# Patient Record
Sex: MALE | Race: WHITE | Employment: FULL TIME | URBAN - METROPOLITAN AREA
[De-identification: names, ages, dates, MRNs, and addresses within clinical notes are randomized per-mention and may not be internally consistent; named-entity substitution may affect disease eponyms.]

---

## 2019-03-14 ENCOUNTER — HOSPITAL ENCOUNTER (INPATIENT)
Dept: CARDIAC CATH/INVASIVE PROCEDURES | Age: 49
LOS: 1 days | Discharge: HOME | DRG: 247 | End: 2019-03-15
Attending: INTERNAL MEDICINE | Admitting: INTERNAL MEDICINE
Payer: COMMERCIAL

## 2019-03-14 ENCOUNTER — DIRECT ADMIT ORDERS (OUTPATIENT)
Dept: INTERNAL MEDICINE CLINIC | Age: 49
End: 2019-03-14

## 2019-03-14 ENCOUNTER — HOSPITAL ENCOUNTER (OUTPATIENT)
Dept: NON INVASIVE DIAGNOSTICS | Age: 49
Discharge: HOME | DRG: 247 | End: 2019-03-14
Payer: COMMERCIAL

## 2019-03-14 DIAGNOSIS — R94.39 ABNORMAL STRESS TEST: ICD-10-CM

## 2019-03-14 DIAGNOSIS — K21.9 GASTROESOPHAGEAL REFLUX DISEASE WITHOUT ESOPHAGITIS: ICD-10-CM

## 2019-03-14 DIAGNOSIS — I10 ESSENTIAL HYPERTENSION: ICD-10-CM

## 2019-03-14 PROBLEM — I20.0 UNSTABLE ANGINA (HCC): Status: ACTIVE | Noted: 2019-03-14

## 2019-03-14 PROBLEM — R07.9 CHEST PAIN: Status: ACTIVE | Noted: 2019-03-14

## 2019-03-14 PROBLEM — I25.10 CAD (CORONARY ARTERY DISEASE): Status: ACTIVE | Noted: 2019-03-01

## 2019-03-14 LAB
EKG ATRIAL RATE: 69 BPM
EKG DIAGNOSIS: NORMAL
EKG P AXIS: 68 DEGREES
EKG P-R INTERVAL: 180 MS
EKG Q-T INTERVAL: 400 MS
EKG QRS DURATION: 102 MS
EKG QTC CALCULATION (BAZETT): 428 MS
EKG R AXIS: -24 DEGREES
EKG T AXIS: 11 DEGREES
EKG VENTRICULAR RATE: 69 BPM
POC ACT LR: 197 SEC

## 2019-03-14 PROCEDURE — C1769 GUIDE WIRE: HCPCS

## 2019-03-14 PROCEDURE — 6370000000 HC RX 637 (ALT 250 FOR IP): Performed by: INTERNAL MEDICINE

## 2019-03-14 PROCEDURE — 2580000003 HC RX 258: Performed by: INTERNAL MEDICINE

## 2019-03-14 PROCEDURE — C1874 STENT, COATED/COV W/DEL SYS: HCPCS

## 2019-03-14 PROCEDURE — C1887 CATHETER, GUIDING: HCPCS

## 2019-03-14 PROCEDURE — 93458 L HRT ARTERY/VENTRICLE ANGIO: CPT

## 2019-03-14 PROCEDURE — 92928 PRQ TCAT PLMT NTRAC ST 1 LES: CPT | Performed by: INTERNAL MEDICINE

## 2019-03-14 PROCEDURE — 6360000002 HC RX W HCPCS: Performed by: INTERNAL MEDICINE

## 2019-03-14 PROCEDURE — 2060000000 HC ICU INTERMEDIATE R&B

## 2019-03-14 PROCEDURE — 99255 IP/OBS CONSLTJ NEW/EST HI 80: CPT | Performed by: INTERNAL MEDICINE

## 2019-03-14 PROCEDURE — 93005 ELECTROCARDIOGRAM TRACING: CPT | Performed by: INTERNAL MEDICINE

## 2019-03-14 PROCEDURE — C1725 CATH, TRANSLUMIN NON-LASER: HCPCS

## 2019-03-14 PROCEDURE — 78452 HT MUSCLE IMAGE SPECT MULT: CPT

## 2019-03-14 PROCEDURE — 93458 L HRT ARTERY/VENTRICLE ANGIO: CPT | Performed by: INTERNAL MEDICINE

## 2019-03-14 PROCEDURE — 6360000004 HC RX CONTRAST MEDICATION: Performed by: INTERNAL MEDICINE

## 2019-03-14 PROCEDURE — 99153 MOD SED SAME PHYS/QHP EA: CPT

## 2019-03-14 PROCEDURE — 6370000000 HC RX 637 (ALT 250 FOR IP)

## 2019-03-14 PROCEDURE — 78451 HT MUSCLE IMAGE SPECT SING: CPT

## 2019-03-14 PROCEDURE — 92928 PRQ TCAT PLMT NTRAC ST 1 LES: CPT

## 2019-03-14 PROCEDURE — C1894 INTRO/SHEATH, NON-LASER: HCPCS

## 2019-03-14 PROCEDURE — 2709999900 HC NON-CHARGEABLE SUPPLY

## 2019-03-14 PROCEDURE — 99152 MOD SED SAME PHYS/QHP 5/>YRS: CPT

## 2019-03-14 PROCEDURE — 2500000003 HC RX 250 WO HCPCS

## 2019-03-14 PROCEDURE — 027034Z DILATION OF CORONARY ARTERY, ONE ARTERY WITH DRUG-ELUTING INTRALUMINAL DEVICE, PERCUTANEOUS APPROACH: ICD-10-PCS | Performed by: INTERNAL MEDICINE

## 2019-03-14 PROCEDURE — 99223 1ST HOSP IP/OBS HIGH 75: CPT | Performed by: INTERNAL MEDICINE

## 2019-03-14 PROCEDURE — B2151ZZ FLUOROSCOPY OF LEFT HEART USING LOW OSMOLAR CONTRAST: ICD-10-PCS | Performed by: INTERNAL MEDICINE

## 2019-03-14 PROCEDURE — 85347 COAGULATION TIME ACTIVATED: CPT

## 2019-03-14 PROCEDURE — 4A023N7 MEASUREMENT OF CARDIAC SAMPLING AND PRESSURE, LEFT HEART, PERCUTANEOUS APPROACH: ICD-10-PCS | Performed by: INTERNAL MEDICINE

## 2019-03-14 PROCEDURE — 3430000000 HC RX DIAGNOSTIC RADIOPHARMACEUTICAL: Performed by: INTERNAL MEDICINE

## 2019-03-14 PROCEDURE — A9502 TC99M TETROFOSMIN: HCPCS | Performed by: INTERNAL MEDICINE

## 2019-03-14 PROCEDURE — 6360000002 HC RX W HCPCS

## 2019-03-14 RX ORDER — SODIUM CHLORIDE 0.9 % (FLUSH) 0.9 %
10 SYRINGE (ML) INJECTION PRN
Status: CANCELLED | OUTPATIENT
Start: 2019-03-14

## 2019-03-14 RX ORDER — SODIUM CHLORIDE 0.9 % (FLUSH) 0.9 %
10 SYRINGE (ML) INJECTION PRN
Status: DISCONTINUED | OUTPATIENT
Start: 2019-03-14 | End: 2019-03-15 | Stop reason: HOSPADM

## 2019-03-14 RX ORDER — ACETAMINOPHEN 325 MG/1
650 TABLET ORAL EVERY 4 HOURS PRN
Status: CANCELLED | OUTPATIENT
Start: 2019-03-14

## 2019-03-14 RX ORDER — SODIUM CHLORIDE 0.9 % (FLUSH) 0.9 %
10 SYRINGE (ML) INJECTION EVERY 12 HOURS SCHEDULED
Status: CANCELLED | OUTPATIENT
Start: 2019-03-14

## 2019-03-14 RX ORDER — ACETAMINOPHEN 325 MG/1
650 TABLET ORAL EVERY 4 HOURS PRN
Status: DISCONTINUED | OUTPATIENT
Start: 2019-03-14 | End: 2019-03-14 | Stop reason: SDUPTHER

## 2019-03-14 RX ORDER — HYDRALAZINE HYDROCHLORIDE 20 MG/ML
10 INJECTION INTRAMUSCULAR; INTRAVENOUS
Status: DISCONTINUED | OUTPATIENT
Start: 2019-03-14 | End: 2019-03-15 | Stop reason: HOSPADM

## 2019-03-14 RX ORDER — SODIUM CHLORIDE 0.9 % (FLUSH) 0.9 %
10 SYRINGE (ML) INJECTION EVERY 12 HOURS SCHEDULED
Status: DISCONTINUED | OUTPATIENT
Start: 2019-03-14 | End: 2019-03-14 | Stop reason: SDUPTHER

## 2019-03-14 RX ORDER — ATORVASTATIN CALCIUM 40 MG/1
40 TABLET, FILM COATED ORAL NIGHTLY
Status: DISCONTINUED | OUTPATIENT
Start: 2019-03-14 | End: 2019-03-15 | Stop reason: HOSPADM

## 2019-03-14 RX ORDER — ACETAMINOPHEN 325 MG/1
650 TABLET ORAL EVERY 4 HOURS PRN
Status: DISCONTINUED | OUTPATIENT
Start: 2019-03-14 | End: 2019-03-15 | Stop reason: HOSPADM

## 2019-03-14 RX ORDER — BISACODYL 10 MG
10 SUPPOSITORY, RECTAL RECTAL DAILY PRN
Status: DISCONTINUED | OUTPATIENT
Start: 2019-03-14 | End: 2019-03-15 | Stop reason: HOSPADM

## 2019-03-14 RX ORDER — SODIUM CHLORIDE 0.9 % (FLUSH) 0.9 %
10 SYRINGE (ML) INJECTION PRN
Status: DISCONTINUED | OUTPATIENT
Start: 2019-03-14 | End: 2019-03-14 | Stop reason: SDUPTHER

## 2019-03-14 RX ORDER — 0.9 % SODIUM CHLORIDE 0.9 %
250 INTRAVENOUS SOLUTION INTRAVENOUS PRN
Status: DISCONTINUED | OUTPATIENT
Start: 2019-03-14 | End: 2019-03-15 | Stop reason: HOSPADM

## 2019-03-14 RX ORDER — MORPHINE SULFATE 2 MG/ML
2 INJECTION, SOLUTION INTRAMUSCULAR; INTRAVENOUS
Status: ACTIVE | OUTPATIENT
Start: 2019-03-14 | End: 2019-03-14

## 2019-03-14 RX ORDER — SODIUM CHLORIDE 0.9 % (FLUSH) 0.9 %
10 SYRINGE (ML) INJECTION EVERY 12 HOURS SCHEDULED
Status: DISCONTINUED | OUTPATIENT
Start: 2019-03-14 | End: 2019-03-15 | Stop reason: HOSPADM

## 2019-03-14 RX ORDER — ASPIRIN 81 MG/1
81 TABLET ORAL DAILY
Status: DISCONTINUED | OUTPATIENT
Start: 2019-03-15 | End: 2019-03-15 | Stop reason: HOSPADM

## 2019-03-14 RX ORDER — ATROPINE SULFATE 0.4 MG/ML
0.5 AMPUL (ML) INJECTION
Status: ACTIVE | OUTPATIENT
Start: 2019-03-14 | End: 2019-03-14

## 2019-03-14 RX ORDER — METOPROLOL SUCCINATE 25 MG/1
12.5 TABLET, EXTENDED RELEASE ORAL EVERY EVENING
Status: DISCONTINUED | OUTPATIENT
Start: 2019-03-14 | End: 2019-03-15 | Stop reason: HOSPADM

## 2019-03-14 RX ORDER — ONDANSETRON 2 MG/ML
4 INJECTION INTRAMUSCULAR; INTRAVENOUS EVERY 6 HOURS PRN
Status: CANCELLED | OUTPATIENT
Start: 2019-03-14

## 2019-03-14 RX ORDER — ONDANSETRON 2 MG/ML
4 INJECTION INTRAMUSCULAR; INTRAVENOUS EVERY 6 HOURS PRN
Status: DISCONTINUED | OUTPATIENT
Start: 2019-03-14 | End: 2019-03-15 | Stop reason: HOSPADM

## 2019-03-14 RX ORDER — ONDANSETRON 2 MG/ML
4 INJECTION INTRAMUSCULAR; INTRAVENOUS EVERY 6 HOURS PRN
Status: DISCONTINUED | OUTPATIENT
Start: 2019-03-14 | End: 2019-03-14 | Stop reason: SDUPTHER

## 2019-03-14 RX ADMIN — IOPAMIDOL 125 ML: 755 INJECTION, SOLUTION INTRAVENOUS at 12:18

## 2019-03-14 RX ADMIN — TETROFOSMIN 30 MILLICURIE: 0.23 INJECTION, POWDER, LYOPHILIZED, FOR SOLUTION INTRAVENOUS at 09:45

## 2019-03-14 RX ADMIN — Medication 10 ML: at 19:44

## 2019-03-14 RX ADMIN — METOPROLOL SUCCINATE 12.5 MG: 25 TABLET, EXTENDED RELEASE ORAL at 16:25

## 2019-03-14 RX ADMIN — ATORVASTATIN CALCIUM 40 MG: 40 TABLET, FILM COATED ORAL at 19:54

## 2019-03-14 RX ADMIN — TETROFOSMIN 10 MILLICURIE: 0.23 INJECTION, POWDER, LYOPHILIZED, FOR SOLUTION INTRAVENOUS at 08:17

## 2019-03-14 RX ADMIN — TIROFIBAN 0.15 MCG/KG/MIN: 5 INJECTION, SOLUTION INTRAVENOUS at 16:22

## 2019-03-14 ASSESSMENT — PAIN SCALES - GENERAL
PAINLEVEL_OUTOF10: 0

## 2019-03-14 NOTE — ANESTHESIA PRE-OP
command  Respiration:  2 - Able to breathe deeply and cough freely  Circulation:  2 - BP+/- 20mmHg of normal  Consciousness:  2 - Fully awake  Oxygen Saturation (color):  2 - Able to maintain oxygen saturation >92% on room air    Sedation/Anesthesia Plan:  Guard the patient's safety and welfare. Minimize physical discomfort and pain. Minimize negative psychological responses to treatment by providing sedation and analgesia and maximize the potential amnesia. Patient to meet pre-procedure discharge plan.     Medication Planned:  midazolam intravenously, fentanyl intravenously and midazolam intravenously, fentanyl intravenously    Patient is an appropriate candidate for plan of sedation: yes      Electronically signed by Tessie Ramey MD on 3/14/2019 at 11:28 AM

## 2019-03-14 NOTE — H&P
[facility] Internal Medicine  History and Physical      CHIEF COMPLAINT:  Chest pain    History of Present Illness:    Patient with history of HTN and tobacco abuse since age 16 who recently presented to the outpatient office after a 7 year absence. He had been having some heartburn, and had been exercising up until about one month ago. Recently in addition to the heartburn, he had begun to notice some chest pain with smoking and when walking in the grocery store, but had not been having symptoms with prior exercise. His EKG had shown Inc RBBB and left ant fascicular block. He was set up for a GXT myoview today. During the stress test he developed ST depression in the inferior leads with hypotension. He is now being admitted for urgent coronary angiogram.    The patient is currently getting prepped in the preop area. Currently without CP or SOB. No cough or sputum. No nausea, vomiting, diarrhea. No abdominal pain. No dysuria. The remainder of the review of systems is negative. Past Medical History:   Diagnosis Date    Depression     Since mother  in , Depression    Lumbar vertebral fracture ([facility] [address]     High school football injury       No past surgical history on file. Medications Prior to Admission:    Amlodipine 5 mg daily  Protonix 40 mg daily  Aspirin 81 mg daily    Allergies:    Patient has no known allergies. Social History:    reports that he quit smoking about 5 weeks ago. He started smoking about 36 years ago. He smoked 0.50 packs per day. He has never used smokeless tobacco. He reports that he drinks alcohol. He reports that he does not use drugs. Family History:   family history includes Anemia in his mother; Cancer in his father; Hypertension in his mother. REVIEW OF SYSTEMS:  As above in the HPI, otherwise negative    PHYSICAL EXAM:    Vitals:   At the time of this history and physical exam, performed in the preop area for cardiac catheter lab, no vital signs have been recorded. General:  Awake, alert, oriented X 3. Well developed, well nourished. No apparent distress. HEENT:  Normocephalic, atraumatic. Pupils equal, round, reactive to light. No scleral icterus. No conjunctival injection. Normal lips, teeth, and gums. No nasal discharge. Neck:  Supple. No carotid bruit, carotid upstroke normal.  Heart:  RRR, no murmurs, gallops, or rubs. PMI non-displaced  Lungs:  CTA bilaterally, bilat symmetrical expansion, no wheeze, rales, or rhonchi. Respirations easy. Abdomen: Bowel sounds present, normoactive. Soft, nontender/nondistended. No masses, no peritoneal signs. Extremities:  No clubbing, cyanosis, or edema  Skin:  Warm and dry, no open lesions or rash. Neuro:  Cranial nerves 2-12 intact, no focal deficits. Moves all extremities without difficulty. LABS:    No results found for this or any previous visit (from the past 24 hour(s)). ASSESSMENT/PLAN:      Principal Problem:    Abnormal stress test- patient admitted for urgent coronary angiogram.  The case has been discussed with Dr Marlen Urrutia. Active Problems:    Essential hypertension- currently on amlodipine, will follow along after the procedure.     Gastroesophageal reflux disease without esophagitis- has been improved with protonix    Please note that over 40 minutes was spent in evaluating the patient, review of records and results, discussion with staff/family, etc.    Deanna Hua MD  10:54 AM  3/14/2019

## 2019-03-14 NOTE — CONSULTS
[facility]  1970    [facility]    Reason for Consult: Chest Pain  CC: Chest Pain    HPI:  The patient is 51 y.o. male with a past medical history significant for tobacco abuse who presented to the Stress Lab for a treadmill nuclear study. In recovery he developed inferior ST elevation, chest pain and hypotension. He states that he has been having chest pain for about four weeks. This occurred while he was smoking initially but has not occurred with mild exertion. He has curtailed his exertional activities to this chest pain. He does endorse a little shortness of breath and nausea with it. The chest pain he developed on the treadmill has largely resolved with a sublingual nitro and his ST segments have improved. Review of Systems:  Constitutional: No fatigue, weakness, night sweats or fever. HEENT: No new vision difficulties or ringing in the ears. Respiratory: No new SOB, PND, orthopnea or cough. Cardiovascular: See HPI   GI: No n/v, diarrhea, constipation, abdominal pain or changes in bowel habits. No melena, no hematochezia  : No urinary frequency, urgency, incontinence, hematuria or dysuria. Skin: No cyanosis or skin lesions. Musculoskeletal: No new muscle or joint pain. Neurological: No syncope or TIA-like symptoms. Psychiatric: No anxiety, insomnia or depression     Past Medical History:   Diagnosis Date    Depression     Since mother  in , Depression    Lumbar vertebral fracture ([facility] [address]     High school football injury     No past surgical history on file.   Family History   Problem Relation Age of Onset    Hypertension Mother     Anemia Mother         Aplastic Anemia    Cancer Father         CLL     Social History     Tobacco Use    Smoking status: Current Some Day Smoker     Packs/day: 0.50    Smokeless tobacco: Never Used   Substance Use Topics    Alcohol use: Yes     Comment: Rare    Drug use: No       No Known Allergies  Current Outpatient Medications   Medication Sig Dispense Refill    Multiple Vitamin (MULTI-VITAMIN DAILY PO) Take by mouth daily      Cyanocobalamin (B-12) 100 MCG TABS Take by mouth daily      pantoprazole (PROTONIX) 40 MG tablet Take 1 tablet by mouth every morning (before breakfast) 30 tablet 5    amLODIPine (NORVASC) 5 MG tablet Take 1 tablet by mouth daily 30 tablet 0    aspirin (ASPIRIN CHILDRENS) 81 MG chewable tablet Take 1 tablet by mouth daily 30 tablet 3     No current facility-administered medications for this encounter. Physical Exam:   128/68  62  12  99% on RA  No intake or output data in the 24 hours ending 03/14/19 1037  Wt Readings from Last 2 Encounters:   03/04/19 166 lb (75.3 kg)   04/30/12 186 lb (84.4 kg)     Constitutional: He is oriented to person, place, and time. He appears well-developed and well-nourished. In no acute distress. Head: Normocephalic and atraumatic. Neck: Neck supple. No JVD present. Carotid bruit is not present. No mass and no thyromegaly present. No lymphadenopathy present. Cardiovascular: Normal rate, regular rhythm, normal heart sounds and intact distal pulses. Exam reveals no gallop and no friction rub. No murmur heard. Pulmonary/Chest: Effort normal and breath sounds normal. No respiratory distress. He has no wheezes, rhonchi or rales. Abdominal: Soft, non-tender. Bowel sounds and aorta are normal. He exhibits no organomegaly, mass or bruit. Extremities: No edema, cyanosis, or clubbing. Pulses are 2+ radial/carotid/dorsalis pedis and posterior tibial bilaterally. Neurological: He is alert and oriented to person, place, and time. He has normal reflexes. No cranial nerve deficit. Coordination normal.   Skin: Skin is warm and dry. There is no rash or diaphoresis. Psychiatric: He has a normal mood and affect.  His speech is normal and behavior is normal.     EKG Interpretation: Sinus rhythm    Lab Review:   Lab Results   Component Value Date    TRIG 151 03/04/2019    HDL 54 03/04/2019    LDLCALC 111 03/04/2019    LABVLDL 30 03/04/2019     Lab Results   Component Value Date     03/04/2019    K 4.3 03/04/2019    BUN 14 03/04/2019    CREATININE 0.9 03/04/2019         Assessment:  1. Abnormal Cardiovascular Stress  2. Unstable Angina    Plan:  Puneet's stress result and symptoms are concerning for a high grade RCA lesion. given his inferior ST elevation and hypotension post stress. I discussed the risks and benefits of cardiac catheterization with the patient. I also discussed the possible therapies including medical management, angioplasty and stenting or coronary bypass surgery. The patient is amenable to undergoing the procedure. We will proceed to this urgently. The patient's chest pain is resolving and his ST segments have improved to baseline.

## 2019-03-14 NOTE — PROCEDURES
[address] [address]                            CARDIAC CATHETERIZATION    PATIENT NAME: Deja Barrow                  :        1970  MED REC NO:   [id number]                          ROOM:  ACCOUNT NO:   [de-identified]                           ADMIT DATE: 2019  PROVIDER:     Ivett Lopez MD    DATE OF PROCEDURE:  2019    INDICATION FOR PROCEDURE:  Unstable angina, abnormal cardiovascular  stress test.    PROCEDURE IN DETAIL:  The risks and benefits of the procedure were  explained to the patient and informed consent was obtained. He was  brought urgently to the cardiac catheterization lab from the stress lab  after developing chest pain and inferior ST elevation on the treadmill. The risks and benefits of the procedure were explained to the patient  and informed consent was obtained. He was placed on the cardiac catheterization table and prepped and  draped in sterile fashion. An Marlyce Pickens test was performed on the right  wrist to ensure an intact palmar arch. Anesthesia was provided on the  volar surface of the right wrist with 1% lidocaine injected  subcutaneously and deep. Right radial artery was accessed and  cannulated and a 5-Czech sheath inserted using Seldinger technique. Pre-cocktail of heparin, verapamil, and nitroglycerin was given through  the sheath port. Over the 0.035 J-wire, the JL3.5, 5-Czech diagnostic catheter was  advanced to the ostium of the left main coronary artery. Coronary  angiography was performed to this system. Next, the JR5 catheter was  advanced to the ostium of the right coronary artery and coronary  angiography was performed to this system. The catheter was removed over  the wire. Lastly, the pigtail catheter was advanced over the wire into  the left ventricle.   Left ventricular end-diastolic pressure  measurements were obtained and then a power duration of sedation  was 61 minutes. The patient received a total of 2 mg of IV Versed and  100 mcg of fentanyl. Vital signs were monitored throughout the case and  remained stable. There were no complications from sedation. ASSESSMENT:  1. Right-dominant coronary arterial system with 100% proximal RCA  occlusion that was successfully intervened upon with a 3 mm x 20-mm  Synergy drug-eluting stent dilated to 3.30 mm. This restored RADHA 3  flow in the vessel with 0% residual stenosis. There is 30% circumflex  disease and a 50% mid-LAD lesion but otherwise no obstructive lesions. There were collaterals to the right from the left. 2.  Normal left ventricular systolic function with LV ejection fraction  of 60%. 3.  Low left ventricular end-diastolic pressure at 6 to 8 mmHg. 4.  No gradient across the aortic valve on pullback to suggest aortic  stenosis. Jigna Woods MD    D: 03/14/2019 12:23:28       T: 03/14/2019 12:25:05     [id number]  Job#: [id number]     Doc#: [id number]    CC:   Maria Alejandra Macario MD

## 2019-03-15 VITALS
HEIGHT: 68 IN | OXYGEN SATURATION: 95 % | HEART RATE: 88 BPM | WEIGHT: 162.7 LBS | SYSTOLIC BLOOD PRESSURE: 148 MMHG | BODY MASS INDEX: 24.66 KG/M2 | RESPIRATION RATE: 16 BRPM | TEMPERATURE: 98.2 F | DIASTOLIC BLOOD PRESSURE: 92 MMHG

## 2019-03-15 LAB
ANION GAP SERPL CALCULATED.3IONS-SCNC: 13 MMOL/L (ref 3–16)
BUN BLDV-MCNC: 15 MG/DL (ref 7–20)
CALCIUM SERPL-MCNC: 9 MG/DL (ref 8.3–10.6)
CHLORIDE BLD-SCNC: 104 MMOL/L (ref 99–110)
CHOLESTEROL, TOTAL: 191 MG/DL (ref 0–199)
CO2: 24 MMOL/L (ref 21–32)
CREAT SERPL-MCNC: 1 MG/DL (ref 0.9–1.3)
GFR AFRICAN AMERICAN: >60
GFR NON-AFRICAN AMERICAN: >60
GLUCOSE BLD-MCNC: 103 MG/DL (ref 70–99)
HDLC SERPL-MCNC: 39 MG/DL (ref 40–60)
LDL CHOLESTEROL CALCULATED: 97 MG/DL
PLATELET # BLD: 177 K/UL (ref 135–450)
POTASSIUM SERPL-SCNC: 3.9 MMOL/L (ref 3.5–5.1)
SODIUM BLD-SCNC: 141 MMOL/L (ref 136–145)
TRIGL SERPL-MCNC: 276 MG/DL (ref 0–150)
VLDLC SERPL CALC-MCNC: 55 MG/DL

## 2019-03-15 PROCEDURE — 6370000000 HC RX 637 (ALT 250 FOR IP): Performed by: INTERNAL MEDICINE

## 2019-03-15 PROCEDURE — 80061 LIPID PANEL: CPT

## 2019-03-15 PROCEDURE — 80048 BASIC METABOLIC PNL TOTAL CA: CPT

## 2019-03-15 PROCEDURE — 99239 HOSP IP/OBS DSCHRG MGMT >30: CPT | Performed by: INTERNAL MEDICINE

## 2019-03-15 PROCEDURE — 99232 SBSQ HOSP IP/OBS MODERATE 35: CPT | Performed by: NURSE PRACTITIONER

## 2019-03-15 PROCEDURE — 36415 COLL VENOUS BLD VENIPUNCTURE: CPT

## 2019-03-15 PROCEDURE — 85049 AUTOMATED PLATELET COUNT: CPT

## 2019-03-15 RX ORDER — ATORVASTATIN CALCIUM 40 MG/1
40 TABLET, FILM COATED ORAL NIGHTLY
Qty: 30 TABLET | Refills: 3 | Status: SHIPPED | OUTPATIENT
Start: 2019-03-15 | End: 2019-07-12 | Stop reason: SDUPTHER

## 2019-03-15 RX ORDER — AMLODIPINE BESYLATE 5 MG/1
2.5 TABLET ORAL DAILY
Qty: 30 TABLET | Refills: 0
Start: 2019-03-15 | End: 2019-03-26

## 2019-03-15 RX ORDER — METOPROLOL SUCCINATE 25 MG/1
12.5 TABLET, EXTENDED RELEASE ORAL EVERY EVENING
Qty: 30 TABLET | Refills: 3 | Status: SHIPPED | OUTPATIENT
Start: 2019-03-15 | End: 2019-11-07 | Stop reason: SDUPTHER

## 2019-03-15 RX ORDER — PANTOPRAZOLE SODIUM 40 MG/1
40 TABLET, DELAYED RELEASE ORAL
Status: DISCONTINUED | OUTPATIENT
Start: 2019-03-15 | End: 2019-03-15 | Stop reason: HOSPADM

## 2019-03-15 RX ORDER — AMLODIPINE BESYLATE 5 MG/1
2.5 TABLET ORAL DAILY
Status: DISCONTINUED | OUTPATIENT
Start: 2019-03-15 | End: 2019-03-15 | Stop reason: HOSPADM

## 2019-03-15 RX ADMIN — AMLODIPINE BESYLATE 2.5 MG: 5 TABLET ORAL at 08:40

## 2019-03-15 RX ADMIN — TICAGRELOR 90 MG: 90 TABLET ORAL at 08:40

## 2019-03-15 RX ADMIN — ASPIRIN 81 MG: 81 TABLET, COATED ORAL at 08:40

## 2019-03-15 RX ADMIN — PANTOPRAZOLE SODIUM 40 MG: 40 TABLET, DELAYED RELEASE ORAL at 08:46

## 2019-03-15 ASSESSMENT — PAIN SCALES - GENERAL: PAINLEVEL_OUTOF10: 0

## 2019-03-15 NOTE — CONSULTS
Assessment:     CONSULTS:   IP CONSULT TO CARDIAC REHAB  IP CONSULT TO CARDIOLOGY    Patient has a CARDIOLOGY CONSULT: Yes        EDUCATION STATUS: Patient   [x]  Provided both written and verbal education on Cardiopulmonary Rehabilitation. [x]  Provided instructions for smoking cessation programs. [x]  Provided education of CAD risk factors. [x]  Provided recommendations on activity and exercise. [x]  Provided education on medications. [x]  Provided education on coronary anatomy and coronary interventions. []  Other:    CURRENT DIET: DIET CARDIAC; No Added Salt (3-4 GM)    EDUCATIONAL PACKETS PROVIDED- PRINTED FROM [employer]. Titles and material given:  Yes   [x]  Managing Heart Disease and Preventing Stroke  []  Heart Owner's Manual  []  Living Well with Heart Failure  []  Other:     PATIENT/CAREGIVER TEACHING: patient   Level of patient/caregiver understanding able to:   [x] Verbalize understanding   [] Demonstrate understanding       [] Teach back        [] Needs reinforcement     []  Other:       TEACHING TIME: 15 minutes       Plan:       DISCHARGE PLAN:  Placement for patient upon discharge: home with support   Hospice Care:  no  Code Status: Full Code  Discharge appointment scheduled: No     RECOMMENDATIONS:   [x]  Patient/Family instructed to call Cardiopulmonary Rehab ([phone]) upon discharge schedule the initial evaluation  [x]  Encourage to call Cardiopulmonary Rehabilitation with any questions. []  Referral to alternate Cardiopulmonary Rehabilitation facility.    []  Other:    [x] Appointment scheduled for: in order to assure his schedule with be accommodated, the patient would like to discuss the option of participating in the program with his employer prior to scheduling   [x] Chooses to not schedule at this time          Electronically signed by Donnie Parra RN,MS on 3/15/2019 at 9:28 AM

## 2019-03-15 NOTE — PLAN OF CARE
Problem: Bleeding:  Goal: Will show no signs and symptoms of excessive bleeding  Description  Will show no signs and symptoms of excessive bleeding  Outcome: Ongoing  Note:   Patient educated on anticoagulation therapy and medication. Monitor for signs of bleeding. Problem: SAFETY  Goal: Free from accidental physical injury  Outcome: Ongoing  Note:   Patient assessed for fall risk; fall precautions initiated. Patient and family instructed about safety devices. Environment kept free of clutter and adequate lighting provided. Bed locked and in lowest position. Call light within reach. Will continue to monitor. Problem: DAILY CARE  Goal: Daily care needs are met  Outcome: Ongoing  Note:   Patient's ability assessed to perform self care and independent activity encouraged according to that ability. Assisted with ADL's as needed. Risk for skin breakdown assessed. Potential discharge needs assessed. Patient and family included in daily care decisions. Problem: PAIN  Goal: Patient's pain/discomfort is manageable  Outcome: Ongoing  Note:   Pain/discomfort being managed with PRN analgesics per MD orders. Pt able to express presence and absence of pain and rate pain appropriately using numerical scale. Problem: Cardiac Output - Decreased:  Goal: Hemodynamic stability will improve  Description  Hemodynamic stability will improve  Outcome: Ongoing  Note:   Patient given cardiac folder and educated on chest pain, cardiac cath, stents and antiplatelets. Monitor vitals, heart rate, and cardiac output.

## 2019-03-15 NOTE — DISCHARGE SUMMARY
[address]                               DISCHARGE SUMMARY    PATIENT NAME: Montse Mccoy                  :        1970  MED REC NO:   [id number]                          ROOM:       5102  ACCOUNT NO:   [de-identified]                           ADMIT DATE: 2019  PROVIDER:     Teri Sesay MD                  DISCHARGE DATE:        DISCHARGE DATE:  03/15/2019    REASON FOR ADMISSION:  Unstable angina and abnormal cardiovascular  stress test.    HISTORY OF PRESENT ILLNESS:  This is a 56-year-old white male who  presented to the outpatient office approximately one week ago with  multiple concerns including some acid reflux, elevated blood pressure,  and some intermittent chest pain. The patient had an abnormal EKG at  that time showing a left anterior hemiblock and an incomplete right  bundle branch block. He had been exercising regularly approximately one  month earlier and was not having symptoms at the time but in the last  couple of weeks had noticed increased chest discomfort with smoking as  well as walking any significant distance. The symptoms were  intermittent. He was set up for an outpatient cardiovascular stress  test.  Following the treadmill yesterday, he developed 1 mm of ST  elevation and hypertension approximately 11 minutes into exercising. He  was urgently admitted and taken to the cath lab where he was found to  have 100% RCA occlusion. Dr. Eleanor Westbrook was able to open the artery  and a stent was placed in that area. Of note, he had a 30% circumflex  lesion and a 50% mid-LAD lesion as well. HOSPITAL COURSE:  1. Unstable angina/coronary artery disease status post drug-eluting  stent to his right coronary artery. The patient is postop day number 1  acute coronary intervention.   His left ventricular function is normal.   He has been started on aspirin and Brilinta, as well as Toprol and  Lipitor. He will be discharged to home today if okay with Cardiology  and follow up as an outpatient. He has a long smoking history but has  not smoked in the last month approximately and he has been advised to  stay away from cigarettes again, to which he understands entirely. Followup will be with Dr. Lynette Madrid in approximately one week and Dr. Rebecca Dupont as well. He will continue on the current medication regimen,  again, and follow up as an outpatient. 2.  Essential hypertension. The patient was started on amlodipine 5 mg  approximately one week ago. His amlodipine dose will be decreased to  2.5 mg daily and he will be continued on metoprolol succinate 12.5 mg  once daily with this. Blood pressure will be monitored as an  outpatient. 3.  Acid reflux. The patient does have some acid reflux disease which  appears separate from his coronary chest pain. This has improved with  his Protonix therapy and this will be continued for the time being. DISCHARGE MEDICATIONS:  Please see the discharge med rec form for  discharge medications. CONDITION ON DISCHARGE:  Good. FOLLOWUP:  Again, followup will be with Dr. Rebecca Dupont and Dr. Lynette Madrid in  approximately one week's time.         Laxmi Ramirez MD    D: 03/15/2019 7:35:22       T: 03/15/2019 8:13:11     [email or web address]  Job#: [id number]     Doc#: [id number]    CC:

## 2019-03-15 NOTE — PROGRESS NOTES
[facility] Internal Medicine Note      Chief Complaint: I did not sleep very well    Subjective:    Uneventful night. No chest pain. Just did not sleep very well. Ate well last evening. No new problems otherwise. Feels well. No chest pain or shortness breath. No cough or sputum. No nausea, vomiting, diarrhea. No abdominal pain. No dysuria. The remainder of the review of systems is negative. PMH, PSH, FH/SH reviewed and unchanged as documented in the H&P personally documented at admission (3/14/19)    Medication list reviewed    Objective:    /72   Pulse 70   Temp 98.3 °F (36.8 °C) (Oral)   Resp 16   Ht 5' 8\" (1.727 m)   Wt 162 lb 11.2 oz (73.8 kg)   SpO2 97%   BMI 24.74 kg/m²   Temp  Av.1 °F (36.7 °C)  Min: 97.8 °F (36.6 °C)  Max: 98.7 °F (37.1 °C)    Regular rate and rhythm  Chest-respirations easy, clear throughout. No wheezes or rhonchi or crackles. Abdomen-bowel sounds positive, soft, nontender, nondistended  Extremities-no edema. Right radial access site looks okay.     The Following Labs Were Reviewed Today:    Recent Results (from the past 24 hour(s))   POC ACT-Low Range    Collection Time: 19 11:41 AM   Result Value Ref Range    POC ACT  Not Established sec   EKG 12 lead    Collection Time: 19  1:22 PM   Result Value Ref Range    Ventricular Rate 69 BPM    Atrial Rate 69 BPM    P-R Interval 180 ms    QRS Duration 102 ms    Q-T Interval 400 ms    QTc Calculation (Bazett) 428 ms    P Axis 68 degrees    R Axis -24 degrees    T Axis 11 degrees    Diagnosis       Normal sinus rhythmNormal ECGNo previous ECGs availableConfirmed by PERLA DAN MD ([id number]) on 3/14/2019 5:48:24 PM   Basic Metabolic Panel    Collection Time: 03/15/19  4:08 AM   Result Value Ref Range    Sodium 141 136 - 145 mmol/L    Potassium 3.9 3.5 - 5.1 mmol/L    Chloride 104 99 - 110 mmol/L    CO2 24 21 - 32 mmol/L    Anion Gap 13 3 - 16    Glucose 103 (H) 70 - 99 mg/dL    BUN 15 7 - 20 mg/dL

## 2019-03-15 NOTE — CARE COORDINATION
Case Management:  Discussed discharge needs with patient who tells cm he is independent in all aspects of care and  plans to return home with no anticipated needs. Patient has discharge script for Brilinta in hand, rest of discharge medication were sent to [facility] and patient aware to .   Electronically signed by Ariela Marina on 3/15/2019 at 10:15 [id number]

## 2019-03-15 NOTE — PROGRESS NOTES
[facility] [address]   Daily Progress Note      Admit Date:  3/14/2019    Reason for follow up visit: CAD/PCI    CC: \"I feel great!\"    50 y/o male with PMH significant for tobacco use who presented for stress test yesterday and developed inferior ST elevation, chest pain and hypotension who underwent cardiac cath with PCI yesterday (KATE to RCA). Interval History:  Denies chest pain, SOB, cough, palpitations, dizziness  BP noted. Ambulating in aguilar without complaints. Subjective:  Pt with no acute overnight cardiac events. Review of Systems:   · Constitutional: no unanticipated weight loss. There's been no change in energy level, sleep pattern, or activity level. No fevers, chills. · Eyes: No visual changes or diplopia. No scleral icterus. · ENT: No Headaches, hearing loss or vertigo. No mouth sores or sore throat. · Cardiovascular: as reviewed in HPI  · Respiratory: No cough or wheezing, no sputum production. No hematemesis. · Gastrointestinal: No abdominal pain, appetite loss, blood in stools. No change in bowel or bladder habits. · Genitourinary: No dysuria, trouble voiding, or hematuria. · Musculoskeletal:  No gait disturbance, no joint complaints. · Integumentary: No rash or pruritis. · Neurological: No headache, diplopia, change in muscle strength, numbness or tingling. · Psychiatric: No anxiety or depression. · Endocrine: No temperature intolerance. No excessive thirst, fluid intake, or urination. No tremor. · Hematologic/Lymphatic: No abnormal bruising or bleeding, blood clots or swollen lymph nodes. · Allergic/Immunologic: No nasal congestion or hives.     Objective:   /86   Pulse 65   Temp 97.9 °F (36.6 °C) (Oral)   Resp 16   Ht 5' 8\" (1.727 m)   Wt 162 lb 11.2 oz (73.8 kg)   SpO2 95%   BMI 24.74 kg/m²       Intake/Output Summary (Last 24 hours) at 3/15/2019 1157  Last data filed at 3/15/2019 0830  Gross per 24 hour   Intake 1160 ml   Output 1750 ml   Net -590 ml     Wt Readings from Last 3 Encounters:   03/15/19 162 lb 11.2 oz (73.8 kg)   03/14/19 166 lb (75.3 kg)   03/04/19 166 lb (75.3 kg)       Physical Exam:  General: In no acute distress. Awake, alert, and oriented X4. Up in chair. Skin:  Warm and dry. No new appearing rashes or lesions. Neck:  Supple. No JVD or carotid bruit appreciated. Chest: Lungs clear to auscultation. No wheezes/rhonchi/rales  Cardiovascular:  RRR. Normal S1 and S2. No murmur/gallop or rub   Abdomen:  soft, nontender, nondistended, +bowel sounds. No hepatomegaly  Extremities:  No LE edema. No clubbing or cyanosis. 2+ bilateral radial/DP/PT pulses. Cap refill brisk. R radial site unremarkable. Medications:    amLODIPine  2.5 mg Oral Daily    pantoprazole  40 mg Oral QAM AC    metoprolol succinate  12.5 mg Oral QPM    atorvastatin  40 mg Oral Nightly    aspirin  81 mg Oral Daily    ticagrelor  90 mg Oral BID    sodium chloride flush  10 mL Intravenous 2 times per day       sodium chloride, bisacodyl, hydrALAZINE, acetaminophen, magnesium hydroxide, ondansetron, sodium chloride flush    Lab Data:  CBC:   Recent Labs     03/15/19  0409        BMP:    Recent Labs     03/15/19  0408      K 3.9   CO2 24   BUN 15   CREATININE 1.0     Results for Jessica Galarza (MRN [id number]) as of 3/15/2019 12:16   Ref. Range 3/15/2019 04:08   Cholesterol, Total Latest Ref Range: 0 - 199 mg/dL 191   HDL Cholesterol Latest Ref Range: 40 - 60 mg/dL 39 (L)   LDL Calculated Latest Ref Range: <100 mg/dL 97   Triglycerides Latest Ref Range: 0 - 150 mg/dL 276 (H)   VLDL Cholesterol Calculated Latest Ref Range: Not Established mg/dL 55     ECG: Sinus rhythm    Cardiac cath with PCI 3/15/2019:  1. Right-dominant coronary arterial system with 100% proximal RCA  occlusion that was successfully intervened upon with a 3 mm x 20-mm  Synergy drug-eluting stent dilated to 3.30 mm. This restored RADHA 3  flow in the vessel with 0% residual stenosis. There is 30% circumflex  disease and a 50% mid-LAD lesion but otherwise no obstructive lesions. There were collaterals to the right from the left. 2.  Normal left ventricular systolic function with LV ejection fraction  of 60%. 3.  Low left ventricular end-diastolic pressure at 6 to 8 mmHg. 4.  No gradient across the aortic valve on pullback to suggest aortic  stenosis. 3/14/2019 Stress-Myoview:  Abnormal ECG portion of stress test with 1 mm ST elevation suggestive of    myocardial injury.    The stress imaging was obviously cancelled and the patient was taken    emergently to the cath lab.    Only resting images available and there is an inferior wall defect but may    be artifact from bowel. Telemetry: Sinus rhythm without ectopy    Assessment/Plan:    1. Coronary artery disease/Abnormal cardiovascular stress test  -S/P KATE to RCA 3/14/2019  -residual circ and mid LAD; normal LVEF  -continue ASA, Brilinta, statin and BB  -risk factor modification    2. Hyperlipidemia, goal LDL < 70  -on statin    3. Tobacco use  -quit smoking 1 month ago  -encouraged to continue with cessation    Stable from cardiac standpoint and okay to discharge    Follow up with cardiology: D. Enzweiler, CNP on 3/26/2019 @ 1:20 PM    Low fat/low sodium diet and post PCI activity/limitations discussed    Referred to cardiac rehab for Phase II and will follow up as an outpatient.     Emphasized and discussed continued smoking cessation    OK to return to work on Monday, March 18th, 2019    Discharge Cardiac Meds:  Amlodipine 2.5 mg daily  ASA 81 mg daily  Atorvastatin 40 mg nightly  Toprol XL 12.5 mg daily  Brilinta 90 mg BID    Electronically signed by CONTRERAS Marcial CNP on 3/15/2019 at 11:57 AM

## 2019-03-26 ENCOUNTER — OFFICE VISIT (OUTPATIENT)
Dept: CARDIOLOGY CLINIC | Age: 49
End: 2019-03-26
Payer: COMMERCIAL

## 2019-03-26 VITALS
HEART RATE: 80 BPM | OXYGEN SATURATION: 98 % | BODY MASS INDEX: 28.88 KG/M2 | SYSTOLIC BLOOD PRESSURE: 144 MMHG | HEIGHT: 63 IN | DIASTOLIC BLOOD PRESSURE: 86 MMHG | WEIGHT: 163 LBS

## 2019-03-26 DIAGNOSIS — I25.10 CORONARY ARTERY DISEASE INVOLVING NATIVE CORONARY ARTERY OF NATIVE HEART WITHOUT ANGINA PECTORIS: Primary | ICD-10-CM

## 2019-03-26 DIAGNOSIS — I10 ESSENTIAL HYPERTENSION: ICD-10-CM

## 2019-03-26 DIAGNOSIS — E78.5 HYPERLIPIDEMIA LDL GOAL <70: ICD-10-CM

## 2019-03-26 PROCEDURE — 93000 ELECTROCARDIOGRAM COMPLETE: CPT | Performed by: NURSE PRACTITIONER

## 2019-03-26 PROCEDURE — 99213 OFFICE O/P EST LOW 20 MIN: CPT | Performed by: NURSE PRACTITIONER

## 2019-03-26 RX ORDER — AMLODIPINE BESYLATE 5 MG/1
5 TABLET ORAL DAILY
Qty: 30 TABLET | Refills: 0
Start: 2019-03-26 | End: 2019-04-09

## 2019-06-19 ENCOUNTER — TELEPHONE (OUTPATIENT)
Dept: ORTHOPEDIC SURGERY | Age: 49
End: 2019-06-19

## 2019-12-19 ENCOUNTER — TELEPHONE (OUTPATIENT)
Dept: FAMILY MEDICINE CLINIC | Age: 49
End: 2019-12-19

## 2020-03-12 RX ORDER — TICAGRELOR 90 MG/1
TABLET ORAL
Qty: 60 TABLET | Refills: 0 | Status: SHIPPED | OUTPATIENT
Start: 2020-03-12 | End: 2020-04-20

## 2020-04-20 RX ORDER — TICAGRELOR 90 MG/1
TABLET ORAL
Qty: 60 TABLET | Refills: 0 | Status: SHIPPED | OUTPATIENT
Start: 2020-04-20 | End: 2020-04-29 | Stop reason: ALTCHOICE

## 2020-04-29 ENCOUNTER — VIRTUAL VISIT (OUTPATIENT)
Dept: CARDIOLOGY CLINIC | Age: 50
End: 2020-04-29
Payer: COMMERCIAL

## 2020-04-29 VITALS
BODY MASS INDEX: 29.1 KG/M2 | DIASTOLIC BLOOD PRESSURE: 93 MMHG | SYSTOLIC BLOOD PRESSURE: 161 MMHG | HEART RATE: 78 BPM | WEIGHT: 192 LBS | HEIGHT: 68 IN

## 2020-04-29 PROCEDURE — 99214 OFFICE O/P EST MOD 30 MIN: CPT | Performed by: INTERNAL MEDICINE

## 2020-04-29 RX ORDER — CETIRIZINE HYDROCHLORIDE 10 MG/1
10 TABLET ORAL DAILY
COMMUNITY

## 2020-04-29 NOTE — PROGRESS NOTES
being evaluated by a Virtual Visit (video visit) encounter to address concerns as mentioned above. A caregiver was present when appropriate. Due to this being a TeleHealth encounter (During BETFA-00 public health emergency), evaluation of the following organ systems was limited: Vitals/Constitutional/EENT/Resp/CV/GI//MS/Neuro/Skin/Heme-Lymph-Imm. Pursuant to the emergency declaration under the [address] [place] [address] and the [employer] and [employer] Act, this Virtual Visit was conducted with patient's (and/or legal guardian's) consent, to reduce the patient's risk of exposure to COVID-19 and provide necessary medical care. The patient (and/or legal guardian) has also been advised to contact this office for worsening conditions or problems, and seek emergency medical treatment and/or call 911 if deemed necessary. Patient identification was verified at the start of the visit: Yes    Total time spent on this encounter: Not billed by time    Services were provided through a video synchronous discussion virtually to substitute for in-person clinic visit. Patient and provider were located at their individual homes. --Henry Wyatt RN on 4/29/2020 at 1:36 PM    An electronic signature was used to authenticate this note. This note was scribed in the presence of Malena Hernandez MD by [employer], RN. Physician Attestation:  The scribes documentation has been prepared under my direction and personally reviewed by me in its entirety. I, Dr. Dakotah Bryant personally performed the services described in this documentation as scribed by my RN,  [employer] in my presence, and I confirm that the note above accurately reflects all work, treatment, procedures, and medical decision making performed by me.

## 2020-11-23 NOTE — PROGRESS NOTES
[facility]  Office Visit    Donna Wilkerson  1970    2020    CC:   Chief Complaint   Patient presents with    6 Month Follow-Up     No CC     HPI:  The patient is 53 y.o. male with a past medical history significant for CAD/PCI, hyperlipidemia, HTN and tobacco use who is here for 6 month follow. He initially presented in 2019 after presenting for stress testing and developed inferior ST elevation. Cardiac cath at that time resulted in KATE to RCA. He last visited with Dr. Ryan Gabriel in April via virtual visit and he was instructed he could stop his Brilinta after his current prescription at that time. Overall feeling well. Has refrained from smoking x 1+ years. Weight gain 10# over last year (he attributes to extra weight gained when he quit smoking). Was working out 5x week while working at home and now exercising 2-4x week. Denies chest pain/discomfort, SOB, orthopnea/PND, cough, palpitations, dizziness, syncope, edema , weight change or claudication. Review of Systems:  Constitutional: Denies  fatigue, weakness, night sweats or fever. HEENT: Denies new visual changes, ringing in ears, nosebleeds,nasal congestion  Respiratory: Denies new or change in SOB, PND, orthopnea or cough. Cardiovascular: see HPI  GI: Denies N/V, diarrhea, constipation, abdominal pain, change in bowel habits, melena or hematochezia  : Denies urinary frequency, urgency, incontinence, hematuria or dysuria. Skin: Denies rash, hives, or cyanosis  Musculoskeletal: Denies joint or muscle aches/pain  Neurological: Denies syncope or TIA-like symptoms.   Psychiatric: Denies anxiety, insomnia or depression     Past Medical History:   Diagnosis Date    CAD (coronary artery disease) 2019    Depression     Since mother  in , Depression    Hyperlipidemia     Hypertension     Lumbar vertebral fracture ([facility] [address]     High school football injury     Past Surgical History:   Procedure and time. He appears well-developed and well-nourished. In no acute distress. HEENT: Normocephalic and atraumatic. Sclerae anicteric. No xanthelasmas. EOM's intact. Neck: Supple. No JVD present. Carotids without bruits. No thyromegaly present. No lymphadenopathy present. Cardiovascular: RRR, normal S1 and S2; no murmur/gallop or rub  Pulmonary/Chest: Effort normal.  Lungs clear to auscultation. Chest wall nontender  Abdominal: soft, nontender, nondistended. + bowel sounds; no hepatomegaly Extremities: No edema, cyanosis, or clubbing. Pulses are 2+ radial/DP/PT bilaterally. Cap refill brisk. Neurological: No focal deficit. Skin: Skin is warm and dry. Psychiatric: He has a normal mood and affect. His speech is normal and behavior is normal.     Lab Review:   Lab Results   Component Value Date    TRIG 103 06/05/2020    HDL 63 06/05/2020    LDLCALC 87 06/05/2020    LABVLDL 21 06/05/2020     Lab Results   Component Value Date     06/05/2020    K 4.5 06/05/2020     06/05/2020    CO2 26 06/05/2020    BUN 17 06/05/2020    CREATININE 1.1 06/05/2020    GLUCOSE 115 06/05/2020    CALCIUM 9.4 06/05/2020      Lab Results   Component Value Date    WBC 6.3 06/05/2020    HGB 14.7 06/05/2020    HCT 43.4 06/05/2020    MCV 94.3 06/05/2020     06/05/2020     ECG 11/24/2020:  Appears to be underlying sinus rhythm; difficult to interpret d/t wandering baseline    3/14/2019 Stress Myoview:  Abnormal ECG portion of stress test with 1 mm ST elevation suggestive of     myocardial injury.     The stress imaging was obviously cancelled and the patient was taken     emergently to the cath lab.    Dougie Barrios resting images available and there is an inferior wall defect but may     be artifact from bowel.           3/14/2019 Cardiac cath/PCI:  1. Right-dominant coronary arterial system with 100% proximal RCA  occlusion that was successfully intervened upon with a 3 mm x 20-mm  Synergy drug-eluting stent dilated to 3.30 mm. This restored RADHA 3  flow in the vessel with 0% residual stenosis. There is 30% circumflex  disease and a 50% mid-LAD lesion but otherwise no obstructive lesions. There were collaterals to the right from the left. 2.  Normal left ventricular systolic function with LV ejection fraction  of 60%. 3.  Low left ventricular end-diastolic pressure at 6 to 8 mmHg. 4.  No gradient across the aortic valve on pullback to suggest aortic  stenosis. Assessment:    1. Coronary artery disease involving native coronary artery of native heart without angina pectoris  -3/2019: S/P KATE to RCA; 30% circ and 50% LAD  -preserved LVEF  -no recurrent angina  -continue medical management with ASA, statin and BB  -risk factor modification    2. Essential hypertension  -controlled  -goal BP < 130/80  -continue medical management    3. Dyslipidemia  -on high intensity statin  -last lipids in June 2020  -reinforced low fat diet and exercise    4. H/O tobacco use  -has refrained from smoking x > 1 year    Plan:  Continue ASA, Toprol, atorvastatin, amlodipine  Discussed low fat/low sodium diet and reinforced regular aerobic exercise. Labs from June 2020 reviewed with patient  Weight loss discussed   Defer laboratory testing to Dr. Smith Current  Follow up with Dr. Mindi Cordova in 1 year or sooner if needed    Return in about 1 year (around 11/24/2021) for with Dr. Mindi Cordova. Thanks for allowing me to participate in the care of this patient.       CHRISTINA Solares  [address], [address], [address] [address]  Office: [phone]  Fax: [phone]      Electronically signed by CONTRERAS Mendoza CNP on 11/24/2020 at 4:51 PM

## 2020-11-24 ENCOUNTER — OFFICE VISIT (OUTPATIENT)
Dept: CARDIOLOGY CLINIC | Age: 50
End: 2020-11-24
Payer: COMMERCIAL

## 2020-11-24 VITALS
BODY MASS INDEX: 33.65 KG/M2 | HEIGHT: 68 IN | WEIGHT: 222 LBS | TEMPERATURE: 97.8 F | SYSTOLIC BLOOD PRESSURE: 132 MMHG | HEART RATE: 75 BPM | OXYGEN SATURATION: 96 % | DIASTOLIC BLOOD PRESSURE: 78 MMHG

## 2020-11-24 PROCEDURE — 93000 ELECTROCARDIOGRAM COMPLETE: CPT | Performed by: NURSE PRACTITIONER

## 2020-11-24 PROCEDURE — 99214 OFFICE O/P EST MOD 30 MIN: CPT | Performed by: NURSE PRACTITIONER

## 2021-03-27 ENCOUNTER — APPOINTMENT (OUTPATIENT)
Dept: CT IMAGING | Age: 51
End: 2021-03-27
Payer: COMMERCIAL

## 2021-03-27 ENCOUNTER — HOSPITAL ENCOUNTER (EMERGENCY)
Age: 51
Discharge: HOME | End: 2021-03-27
Payer: COMMERCIAL

## 2021-03-27 VITALS
TEMPERATURE: 97.6 F | OXYGEN SATURATION: 99 % | SYSTOLIC BLOOD PRESSURE: 123 MMHG | DIASTOLIC BLOOD PRESSURE: 77 MMHG | RESPIRATION RATE: 21 BRPM | HEART RATE: 78 BPM

## 2021-03-27 DIAGNOSIS — R10.13 ABDOMINAL PAIN, EPIGASTRIC: Primary | ICD-10-CM

## 2021-03-27 DIAGNOSIS — R93.89 ABNORMAL FINDING ON CT SCAN: ICD-10-CM

## 2021-03-27 DIAGNOSIS — R09.89 LABILE BLOOD PRESSURE: ICD-10-CM

## 2021-03-27 LAB
A/G RATIO: 1.3 (ref 1.1–2.2)
ALBUMIN SERPL-MCNC: 4.5 G/DL (ref 3.4–5)
ALP BLD-CCNC: 102 U/L (ref 40–129)
ALT SERPL-CCNC: 40 U/L (ref 10–40)
ANION GAP SERPL CALCULATED.3IONS-SCNC: 12 MMOL/L (ref 3–16)
AST SERPL-CCNC: 29 U/L (ref 15–37)
BASOPHILS ABSOLUTE: 0.1 K/UL (ref 0–0.2)
BASOPHILS RELATIVE PERCENT: 0.4 %
BILIRUB SERPL-MCNC: 0.7 MG/DL (ref 0–1)
BILIRUBIN URINE: NEGATIVE
BLOOD, URINE: NEGATIVE
BUN BLDV-MCNC: 15 MG/DL (ref 7–20)
CALCIUM SERPL-MCNC: 9.1 MG/DL (ref 8.3–10.6)
CHLORIDE BLD-SCNC: 103 MMOL/L (ref 99–110)
CLARITY: ABNORMAL
CO2: 21 MMOL/L (ref 21–32)
COLOR: YELLOW
CREAT SERPL-MCNC: 1.2 MG/DL (ref 0.9–1.3)
EOSINOPHILS ABSOLUTE: 0.1 K/UL (ref 0–0.6)
EOSINOPHILS RELATIVE PERCENT: 0.8 %
EPITHELIAL CELLS, UA: 0 /HPF (ref 0–5)
GFR AFRICAN AMERICAN: >60
GFR NON-AFRICAN AMERICAN: >60
GLOBULIN: 3.4 G/DL
GLUCOSE BLD-MCNC: 116 MG/DL (ref 70–99)
GLUCOSE URINE: NEGATIVE MG/DL
HCT VFR BLD CALC: 44.1 % (ref 40.5–52.5)
HEMOGLOBIN: 15.3 G/DL (ref 13.5–17.5)
HYALINE CASTS: 0 /LPF (ref 0–8)
KETONES, URINE: NEGATIVE MG/DL
LEUKOCYTE ESTERASE, URINE: NEGATIVE
LIPASE: 34 U/L (ref 13–60)
LYMPHOCYTES ABSOLUTE: 1.1 K/UL (ref 1–5.1)
LYMPHOCYTES RELATIVE PERCENT: 8.1 %
MCH RBC QN AUTO: 31.4 PG (ref 26–34)
MCHC RBC AUTO-ENTMCNC: 34.6 G/DL (ref 31–36)
MCV RBC AUTO: 90.8 FL (ref 80–100)
MICROSCOPIC EXAMINATION: YES
MONOCYTES ABSOLUTE: 0.7 K/UL (ref 0–1.3)
MONOCYTES RELATIVE PERCENT: 5.3 %
NEUTROPHILS ABSOLUTE: 11.9 K/UL (ref 1.7–7.7)
NEUTROPHILS RELATIVE PERCENT: 85.4 %
NITRITE, URINE: NEGATIVE
PDW BLD-RTO: 13.6 % (ref 12.4–15.4)
PH UA: 6 (ref 5–8)
PLATELET # BLD: 236 K/UL (ref 135–450)
PMV BLD AUTO: 8 FL (ref 5–10.5)
POTASSIUM REFLEX MAGNESIUM: 4 MMOL/L (ref 3.5–5.1)
PROTEIN UA: NEGATIVE MG/DL
RBC # BLD: 4.86 M/UL (ref 4.2–5.9)
RBC UA: 0 /HPF (ref 0–4)
SODIUM BLD-SCNC: 136 MMOL/L (ref 136–145)
SPECIFIC GRAVITY UA: 1 (ref 1–1.03)
TOTAL PROTEIN: 7.9 G/DL (ref 6.4–8.2)
TROPONIN: <0.01 NG/ML
URINE REFLEX TO CULTURE: ABNORMAL
URINE TYPE: ABNORMAL
UROBILINOGEN, URINE: 0.2 E.U./DL
WBC # BLD: 14 K/UL (ref 4–11)
WBC UA: 1 /HPF (ref 0–5)

## 2021-03-27 PROCEDURE — 6360000004 HC RX CONTRAST MEDICATION: Performed by: GENERAL ACUTE CARE HOSPITAL

## 2021-03-27 PROCEDURE — 80053 COMPREHEN METABOLIC PANEL: CPT

## 2021-03-27 PROCEDURE — 2500000003 HC RX 250 WO HCPCS: Performed by: GENERAL ACUTE CARE HOSPITAL

## 2021-03-27 PROCEDURE — 85025 COMPLETE CBC W/AUTO DIFF WBC: CPT

## 2021-03-27 PROCEDURE — 74177 CT ABD & PELVIS W/CONTRAST: CPT

## 2021-03-27 PROCEDURE — 84484 ASSAY OF TROPONIN QUANT: CPT

## 2021-03-27 PROCEDURE — 81001 URINALYSIS AUTO W/SCOPE: CPT

## 2021-03-27 PROCEDURE — 99284 EMERGENCY DEPT VISIT MOD MDM: CPT

## 2021-03-27 PROCEDURE — 93005 ELECTROCARDIOGRAM TRACING: CPT | Performed by: EMERGENCY MEDICINE

## 2021-03-27 PROCEDURE — 83690 ASSAY OF LIPASE: CPT

## 2021-03-27 PROCEDURE — 96374 THER/PROPH/DIAG INJ IV PUSH: CPT

## 2021-03-27 RX ORDER — FAMOTIDINE 20 MG/1
20 TABLET, FILM COATED ORAL 2 TIMES DAILY
Qty: 60 TABLET | Refills: 0 | Status: SHIPPED | OUTPATIENT
Start: 2021-03-27

## 2021-03-27 RX ADMIN — FAMOTIDINE 20 MG: 10 INJECTION, SOLUTION INTRAVENOUS at 18:18

## 2021-03-27 RX ADMIN — IOPAMIDOL 75 ML: 755 INJECTION, SOLUTION INTRAVENOUS at 18:06

## 2021-03-27 ASSESSMENT — PAIN SCALES - GENERAL: PAINLEVEL_OUTOF10: 8

## 2021-03-27 ASSESSMENT — PAIN DESCRIPTION - DESCRIPTORS: DESCRIPTORS: DULL

## 2021-03-27 NOTE — ED NOTES
Pt placed on CMU, pulse ox, and NIBP. Pt given call light and instructed on its use. No new needs at this time. Will cont to monitor.       Elyssa Chowdhury RN  03/27/21 [id number]

## 2021-03-28 LAB
EKG ATRIAL RATE: 101 BPM
EKG DIAGNOSIS: NORMAL
EKG P AXIS: 68 DEGREES
EKG P-R INTERVAL: 166 MS
EKG Q-T INTERVAL: 346 MS
EKG QRS DURATION: 110 MS
EKG QTC CALCULATION (BAZETT): 448 MS
EKG R AXIS: -10 DEGREES
EKG T AXIS: 27 DEGREES
EKG VENTRICULAR RATE: 101 BPM

## 2021-03-28 PROCEDURE — 93010 ELECTROCARDIOGRAM REPORT: CPT | Performed by: INTERNAL MEDICINE

## 2021-03-28 NOTE — ED PROVIDER NOTES
EKG: Sinus tachycardia, rate of 101, incomplete right bundle branch block. Rhythm strip shows sinus tachycardia with a rate of 101, MA interval of under 6 6 ms,  ms with no other ectopy as interpreted by me. This is compared to an EKG dated 11/24/2020, the sinus tachycardia is new, no other significant changes noted.      Doni Bo MD  03/27/21 2438

## 2021-03-28 NOTE — ED NOTES
Discharge paperwork given to and reviewed with pt. Pt verbalized understanding and all questions answered. Pt encouraged to return if having worsening symptoms or new symptoms discussed in discharge paperwork. Pt to follow up with PCP and GI  Rx x 1 given and medications reviewed with pt. IV removed with catheter intact. Pt in NAD, RR even and unlabored.  Pt off unit ambulatory with family     Maikel Moreno RN  03/27/21 2001

## 2021-03-29 ASSESSMENT — ENCOUNTER SYMPTOMS
DIARRHEA: 0
STRIDOR: 0
COUGH: 0
RECTAL PAIN: 0
CHEST TIGHTNESS: 0
ABDOMINAL PAIN: 1
WHEEZING: 0
ABDOMINAL DISTENTION: 1
VOMITING: 0
NAUSEA: 0
SORE THROAT: 0
BACK PAIN: 0
ANAL BLEEDING: 0
EYE PAIN: 0
BLOOD IN STOOL: 0
SHORTNESS OF BREATH: 0
CONSTIPATION: 0

## 2021-03-29 NOTE — ED PROVIDER NOTES
[address] [facility]        Pt Name: Ashley Knox  MRN: [id number]  [place] 1970  Date of evaluation: 3/27/2021  Provider: CONTRERAS Venegas - [place]  PCP: Malena Montague MD    DARIEN. I have evaluated this patient. My supervising physician was available for consultation. CHIEF COMPLAINT       Chief Complaint   Patient presents with    Arm Pain     left upper arm (feels like a rubber band is wrapped around it. denies CP, x 1wk -worsening today     Abdominal Pain     epigastric pain x 1wk    Dizziness     happen this morning, improving now        HISTORY OF PRESENT ILLNESS   (Location, Timing/Onset, Context/Setting, Quality, Duration, Modifying Factors, Severity, Associated Signs and Symptoms)  Note limiting factors. Ashley Knox is a 49 y.o. male who presents to the emergency department today reporting intermittent left upper arm pain that has been present for the past week. Patient also reports epigastric discomfort. He states that he had an episode of dizziness this morning. Patient does report previous MI. He states that he has a cardiac stent. Patient reports recent visit with his cardiologist.  Patient adamantly denies having any chest pain. Patient states that he notices when he has left upper arm pain his blood pressure is elevated. Patient states that he has had issues with managing his blood pressure despite taking his medication directly as prescribed. He denies having any arm pain at present. Patient points to his epigastric area when describing his abdominal discomfort. He describes the pain as constant dull with intermittent burning sensations that radiate into his chest and throat. He has never seen a GI specialist.  He denies previous abdominal surgeries. There is no history of any inflammatory bowel disease. Patient admits to caffeine consumption and reports \"loving spicy foods\".   He denies nausea. He denies frequent belching. He denies fever, chills, or other symptoms. He denies having any shortness of breath. His epigastric pain does not radiate. He has not taken anything for symptoms. Epigastric pain is nonmigratory. There is no history of Marfan's. Nursing Notes were all reviewed and agreed with or any disagreements were addressed in the HPI. REVIEW OF SYSTEMS    (2-9 systems for level 4, 10 or more for level 5)     Review of Systems   Constitutional: Negative for chills and fever. HENT: Negative for congestion and sore throat. Eyes: Negative for pain and visual disturbance. Respiratory: Negative for cough, chest tightness, shortness of breath, wheezing and stridor. Cardiovascular: Negative for chest pain, palpitations and leg swelling. Gastrointestinal: Positive for abdominal distention and abdominal pain. Negative for anal bleeding, blood in stool, constipation, diarrhea, nausea, rectal pain and vomiting. Endocrine: Negative for polydipsia and polyuria. Genitourinary: Negative for difficulty urinating and dysuria. Musculoskeletal: Positive for myalgias. Negative for arthralgias, back pain, gait problem, joint swelling, neck pain and neck stiffness. Skin: Negative for wound. Allergic/Immunologic: Negative for immunocompromised state. Neurological: Negative for dizziness, weakness and headaches. Hematological: Negative for adenopathy. Does not bruise/bleed easily. Psychiatric/Behavioral: Negative for suicidal ideas. Positives and Pertinent negatives as per HPI. Except as noted above in the ROS, all other systems were reviewed and negative.        PAST MEDICAL HISTORY     Past Medical History:   Diagnosis Date    CAD (coronary artery disease) 2019    Depression     Since mother  in , Depression    Hyperlipidemia     Hypertension     Lumbar vertebral fracture ([facility] [address]     High school football injury         SURGICAL HISTORY     Past Surgical History:   Procedure Laterality Date    CORONARY ANGIOPLASTY WITH STENT PLACEMENT  2009    [place] Tongls- % occluded, KATE x 1.  (30% circ, 50% mid LAD)    PTCA  2019    KATE to [facility]       Discharge Medication List as of 3/27/2021  7:57 PM      CONTINUE these medications which have NOT CHANGED    Details   sildenafil (REVATIO) 20 MG tablet TAKE ONE TO FIVE BY MOUTH DAILY AS NEEDED FOR ERECTILE DYSFUNCTION, Disp-60 tablet, R-9Normal      amLODIPine (NORVASC) 10 MG tablet TAKE 1 TABLET BY MOUTH DAILY, Disp-90 tablet, R-3Normal      pantoprazole (PROTONIX) 40 MG tablet TAKE 1 TABLET BY MOUTH EVERY MORNING BEFORE BREAKFAST, Disp-90 tablet, R-3Normal      atorvastatin (LIPITOR) 40 MG tablet TAKE 1 TABLET BY MOUTH EVERY NIGHT, Disp-90 tablet, R-1Normal      SYMBICORT 160-4.5 MCG/ACT AERO INHALE 2 PUFFS INTO THE LUNGS TWICE DAILY, Disp-10.2 g,R-5Normal      metoprolol succinate (TOPROL XL) 25 MG extended release tablet Take 1 tablet by mouth daily, Disp-90 tablet, R-3**Patient requests 90 days supply**Normal      cetirizine (ZYRTEC) 10 MG tablet Take 10 mg by mouth dailyHistorical Med      Multiple Vitamin (MULTI-VITAMIN DAILY PO) Take by mouth dailyHistorical Med      Cyanocobalamin (B-12) 100 MCG TABS Take by mouth dailyHistorical Med      aspirin (ASPIRIN CHILDRENS) 81 MG chewable tablet Take 1 tablet by mouth daily, Disp-30 tablet, R-3Normal               ALLERGIES     Patient has no known allergies. FAMILYHISTORY       Family History   Problem Relation Age of Onset    Hypertension Mother     Anemia Mother         Aplastic Anemia    Cancer Father         CLL          SOCIAL HISTORY       Social History     Tobacco Use    Smoking status: Former Smoker     Packs/day: 0.50     Start date: 1983     Quit date: 2019     Years since quittin.1    Smokeless tobacco: Never Used   Substance Use Topics    Alcohol use: Yes     Comment: Rare    Drug use:  No Result Value    WBC 14.0 (*)     Neutrophils Absolute 11.9 (*)     All other components within normal limits    Narrative:     Performed at:  [facility]  [address] [employer] [address]   Phone [phone]   COMPREHENSIVE METABOLIC PANEL W/ REFLEX TO MG FOR LOW K - Abnormal; Notable for the following components:    Glucose 116 (*)     All other components within normal limits    Narrative:     Performed at:  [facility]  [address] [employer] [address]   Phone [phone]   URINE RT REFLEX TO CULTURE - Abnormal; Notable for the following components:    Clarity, UA CLOUDY (*)     All other components within normal limits    Narrative:     Performed at:  [place] [address] [address] [facility] [address]   Phone [phone]   LIPASE    Narrative:     Performed at:  [address] [employer] [address]   Phone [phone]   TROPONIN    Narrative:     Performed at:  [facility]  [address] [place] [address] [facility] [address]   Phone [phone]   MICROSCOPIC URINALYSIS    Narrative:     Performed at:  [facility]  [address] [place] [address] [facility] [address]   Phone [phone]       All other labs were within normal range or not returned as of this dictation. EKG: All EKG's are interpreted by the Emergency Department Physician in the absence of a cardiologist.  Please see their note for interpretation of EKG.       RADIOLOGY:   Non-plain film images such as CT, Ultrasound and MRI are read by the radiologist. Plain radiographic images are visualized and preliminarily interpreted by the ED Provider with the below findings:        Interpretation per the Radiologist below, if available at the time of this note:    CT ABDOMEN PELVIS W IV CONTRAST Additional Contrast? None Final Result   No acute abnormality in the abdomen or pelvis. Normal appendix. No bowel obstruction. Posterior left lower lobe heterogeneous and ground-glass opacities could   represent subsegmental atelectasis versus a developing   infectious/inflammatory process including aspiration pneumonitis. Follow-up   chest CT in 2-3 months may be helpful to document improvement and evaluate   for malignant potential.           Ct Abdomen Pelvis W Iv Contrast Additional Contrast? None    Result Date: 3/27/2021  EXAMINATION: CT OF THE ABDOMEN AND PELVIS WITH CONTRAST 3/27/2021 6:01 pm TECHNIQUE: CT of the abdomen and pelvis was performed with the administration of intravenous contrast. Multiplanar reformatted images are provided for review. Dose modulation, iterative reconstruction, and/or weight based adjustment of the mA/kV was utilized to reduce the radiation dose to as low as reasonably achievable. COMPARISON: None. HISTORY: ORDERING SYSTEM PROVIDED HISTORY: abd pain TECHNOLOGIST PROVIDED HISTORY: Reason for exam:->abd pain Additional Contrast?->None Decision Support Exception->Emergency Medical Condition (MA) Reason for Exam: epigastric pain & dizziness. no surgery/ca Acuity: Acute Type of Exam: Initial FINDINGS: Lower Chest: Posterior left lower lobe heterogeneous and ground-glass opacities. No cardiomegaly, pericardial or pleural effusions. Coronary artery calcification. Liver: Normal. Gallbladder and Bile Ducts: Normal. Spleen: Normal. Adrenal Glands: Normal. Pancreas: Normal. Genitourinary: Normal. Bowel: The stomach is decompressed and not well evaluated. Normal caliber bowel. Normal appendix. Colonic diverticulosis without acute diverticulitis. The colon is mostly decompressed. No definite pericolonic inflammation. Vasculature: Mild atherosclerosis. No abdominal aortic aneurysm. Patent portal vein and IVC. Bones and Soft Tissues: No acute fracture or destructive osseous lesion.  Degenerative possible infection versus inflammatory process, repeat CT recommended in 3 months. At this time there is no evidence of any life-threatening or emergent conditions requiring immediate intervention. Low suspicion for ACS, MI, aortic dissection, or other acute intra-abdominal pathology. Patient does report improvement of symptoms after intervention. He has remained hemodynamically stable throughout ED visit. Patient is discharged with prescription for Pepcid to take as directed. He is encouraged increase his fluid intake and to consume bland diet for the next several days. He agrees to follow-up with his PCP as well as with GI specialist within the next 2 to 3 days. He agrees return to nearest ED for high fever, incessant vomiting, severe pain, any other worsening symptoms. He is discharged home with his spouse in stable condition. FINAL IMPRESSION      1. Abdominal pain, epigastric    2. Labile blood pressure    3.  Abnormal finding on CT scan          DISPOSITION/PLAN   DISPOSITION Decision To Discharge 03/27/2021 07:40:49 PM      PATIENT REFERREDTO:  Misa Mcfarland, [address] [facility] [address] [place] [address]  [phone]    In 2 days  regarding blood pressure    Lilia Calloway MD  [address] [place] [address] 305 [address] [facility]  [phone]    In 2 days  GI specialist regarding epigastric pain    Your cardiologist    In 2 days        DISCHARGE MEDICATIONS:  Discharge Medication List as of 3/27/2021  7:57 PM      START taking these medications    Details   famotidine (PEPCID) 20 MG tablet Take 1 tablet by mouth 2 times daily, Disp-60 tablet, R-0Normal             DISCONTINUED MEDICATIONS:  Discharge Medication List as of 3/27/2021  7:57 PM                 (Please note that portions of this note were completed with a voice recognition program.  Efforts were made to edit the dictations but occasionally words are mis-transcribed.)    CONTRERAS Tovar - CNP (electronically signed)           CONTRERAS Tovar -

## 2021-04-20 ENCOUNTER — HOSPITAL ENCOUNTER (OUTPATIENT)
Dept: ULTRASOUND IMAGING | Age: 51
Discharge: HOME | End: 2021-04-20
Payer: COMMERCIAL

## 2021-04-20 DIAGNOSIS — R10.13 EPIGASTRIC PAIN: ICD-10-CM

## 2021-04-20 DIAGNOSIS — I10 ESSENTIAL HYPERTENSION: ICD-10-CM

## 2021-04-20 DIAGNOSIS — R63.5 WEIGHT GAIN: ICD-10-CM

## 2021-04-20 DIAGNOSIS — Z11.59 ENCOUNTER FOR HEPATITIS C SCREENING TEST FOR LOW RISK PATIENT: ICD-10-CM

## 2021-04-20 DIAGNOSIS — I25.10 CORONARY ARTERY DISEASE INVOLVING NATIVE CORONARY ARTERY OF NATIVE HEART WITHOUT ANGINA PECTORIS: ICD-10-CM

## 2021-04-20 DIAGNOSIS — E78.5 HYPERLIPIDEMIA LDL GOAL <70: ICD-10-CM

## 2021-04-20 LAB
ANION GAP SERPL CALCULATED.3IONS-SCNC: 14 MMOL/L (ref 3–16)
BASOPHILS ABSOLUTE: 0.1 K/UL (ref 0–0.2)
BASOPHILS RELATIVE PERCENT: 1.2 %
BUN BLDV-MCNC: 16 MG/DL (ref 7–20)
CALCIUM SERPL-MCNC: 9 MG/DL (ref 8.3–10.6)
CHLORIDE BLD-SCNC: 111 MMOL/L (ref 99–110)
CHOLESTEROL, TOTAL: 160 MG/DL (ref 0–199)
CO2: 22 MMOL/L (ref 21–32)
CREAT SERPL-MCNC: 1.1 MG/DL (ref 0.9–1.3)
EOSINOPHILS ABSOLUTE: 0.3 K/UL (ref 0–0.6)
EOSINOPHILS RELATIVE PERCENT: 5.1 %
GFR AFRICAN AMERICAN: >60
GFR NON-AFRICAN AMERICAN: >60
GLUCOSE BLD-MCNC: 106 MG/DL (ref 70–99)
HCT VFR BLD CALC: 41 % (ref 40.5–52.5)
HDLC SERPL-MCNC: 56 MG/DL (ref 40–60)
HEMOGLOBIN: 13.8 G/DL (ref 13.5–17.5)
LDL CHOLESTEROL CALCULATED: 69 MG/DL
LYMPHOCYTES ABSOLUTE: 1.8 K/UL (ref 1–5.1)
LYMPHOCYTES RELATIVE PERCENT: 31.3 %
MCH RBC QN AUTO: 31.2 PG (ref 26–34)
MCHC RBC AUTO-ENTMCNC: 33.6 G/DL (ref 31–36)
MCV RBC AUTO: 92.7 FL (ref 80–100)
MONOCYTES ABSOLUTE: 0.8 K/UL (ref 0–1.3)
MONOCYTES RELATIVE PERCENT: 13.4 %
NEUTROPHILS ABSOLUTE: 2.8 K/UL (ref 1.7–7.7)
NEUTROPHILS RELATIVE PERCENT: 49 %
PDW BLD-RTO: 14.1 % (ref 12.4–15.4)
PLATELET # BLD: 248 K/UL (ref 135–450)
PMV BLD AUTO: 8.7 FL (ref 5–10.5)
POTASSIUM SERPL-SCNC: 4 MMOL/L (ref 3.5–5.1)
RBC # BLD: 4.42 M/UL (ref 4.2–5.9)
SODIUM BLD-SCNC: 147 MMOL/L (ref 136–145)
TRIGL SERPL-MCNC: 177 MG/DL (ref 0–150)
TSH SERPL DL<=0.05 MIU/L-ACNC: 4.79 UIU/ML (ref 0.27–4.2)
VLDLC SERPL CALC-MCNC: 35 MG/DL
WBC # BLD: 5.8 K/UL (ref 4–11)

## 2021-04-20 PROCEDURE — 76705 ECHO EXAM OF ABDOMEN: CPT

## 2021-04-21 LAB — HEPATITIS C ANTIBODY INTERPRETATION: NORMAL

## 2021-06-25 ENCOUNTER — HOSPITAL ENCOUNTER (OUTPATIENT)
Dept: CT IMAGING | Age: 51
Discharge: HOME | End: 2021-06-25
Payer: COMMERCIAL

## 2021-06-25 DIAGNOSIS — R93.89 ABNORMAL CT SCAN: ICD-10-CM

## 2021-06-25 PROCEDURE — 71250 CT THORAX DX C-: CPT

## 2021-09-03 ENCOUNTER — HOSPITAL ENCOUNTER (OUTPATIENT)
Dept: NUCLEAR MEDICINE | Age: 51
Discharge: HOME | End: 2021-09-03
Payer: COMMERCIAL

## 2021-09-03 DIAGNOSIS — R10.9 ABDOMINAL PAIN, UNSPECIFIED ABDOMINAL LOCATION: ICD-10-CM

## 2021-09-03 PROCEDURE — 78227 HEPATOBIL SYST IMAGE W/DRUG: CPT

## 2021-09-03 PROCEDURE — A9537 TC99M MEBROFENIN: HCPCS | Performed by: INTERNAL MEDICINE

## 2021-09-03 PROCEDURE — 3430000000 HC RX DIAGNOSTIC RADIOPHARMACEUTICAL: Performed by: INTERNAL MEDICINE

## 2021-09-03 RX ADMIN — Medication 6 MILLICURIE: at 08:00

## 2021-11-24 NOTE — PROGRESS NOTES
[facility]    Elena Wilkerson  1970    2021    CC: \"I feel well\"     HPI:  The patient is 49 y.o. male with a past medical history significant for CAD with prior PCI, hyperlipidemia and tobacco abuse. He presented for stress testing and developed inferior ST elevation. He underwent left heart catheterization on 3/14/19 resulting in KATE to RCA. He presents today for follow up. He reports feeling well overall. He reports an episode in March where he experienced pain in his abdomen that radiated to his chest. He continues with intermittent abdominal pain but has not been given a definitive diagnosis. He denies chest pain with rest or exertion. Patient denies exertional chest pain/pressure, dyspnea at rest, WAGONER, PND, orthopnea, palpitations, lightheadedness, weight changes, changes in LE edema, and syncope. He reports medication compliance and is tolerating. Review of Systems:  Constitutional: No fatigue, weakness, night sweats or fever. HEENT: No new vision difficulties or ringing in the ears. Respiratory: No new SOB, PND, orthopnea or cough. Cardiovascular: See HPI   GI: No n/v, diarrhea, constipation, abdominal pain or changes in bowel habits. No melena, no hematochezia  : No urinary frequency, urgency, incontinence, hematuria or dysuria. Skin: No cyanosis or skin lesions. Musculoskeletal: No new muscle or joint pain. Neurological: No syncope or TIA-like symptoms.   Psychiatric: No anxiety, insomnia or depression     Past Medical History:   Diagnosis Date    CAD (coronary artery disease) 2019    Depression     Since mother  in , Depression    Hyperlipidemia     Hypertension     Lumbar vertebral fracture ([facility] [address]     High school football injury     Past Surgical History:   Procedure Laterality Date    COLONOSCOPY  2021    [place]cin[place]ga-sigmoid diverticulosis, polyp x2, repeat colonoscopy in 5 years   [facility]    Cecelia Koromaant- % occluded, KATE x 1.  (30% circ, 50% mid LAD)    PTCA  2019    KATE to RCA    UPPER GASTROINTESTINAL ENDOSCOPY  2021    [place]     Family History   Problem Relation Age of Onset    Hypertension Mother     Anemia Mother         Aplastic Anemia    Cancer Father         CLL     Social History     Tobacco Use    Smoking status: Former Smoker     Packs/day: 0.50     Start date: 1983     Quit date: 2019     Years since quittin.8    Smokeless tobacco: Never Used   Vaping Use    Vaping Use: Never used   Substance Use Topics    Alcohol use: Yes     Comment: Rare    Drug use: No       No Known Allergies  Current Outpatient Medications   Medication Sig Dispense Refill    lisinopril (PRINIVIL;ZESTRIL) 20 MG tablet TAKE 1 TABLET BY MOUTH DAILY 30 tablet 5    SYMBICORT 160-4.5 MCG/ACT AERO INHALE 2 PUFFS INTO THE LUNGS TWICE DAILY 10.2 g 5    atorvastatin (LIPITOR) 40 MG tablet TAKE 1 TABLET BY MOUTH EVERY NIGHT 90 tablet 3    metoprolol succinate (TOPROL XL) 25 MG extended release tablet TAKE 1 TABLET BY MOUTH DAILY 90 tablet 3    famotidine (PEPCID) 20 MG tablet Take 1 tablet by mouth 2 times daily 60 tablet 0    sildenafil (REVATIO) 20 MG tablet TAKE ONE TO FIVE BY MOUTH DAILY AS NEEDED FOR ERECTILE DYSFUNCTION 60 tablet 9    amLODIPine (NORVASC) 10 MG tablet TAKE 1 TABLET BY MOUTH DAILY 90 tablet 3    cetirizine (ZYRTEC) 10 MG tablet Take 10 mg by mouth daily      Multiple Vitamin (MULTI-VITAMIN DAILY PO) Take by mouth daily      Cyanocobalamin (B-12) 100 MCG TABS Take by mouth daily      aspirin (ASPIRIN CHILDRENS) 81 MG chewable tablet Take 1 tablet by mouth daily 30 tablet 3     No current facility-administered medications for this visit.        Physical Exam:   /72   Pulse 72   Ht 5' 9\" (1.753 m)   Wt 235 lb (106.6 kg)   SpO2 97%   BMI 34.70 kg/m²   No intake or output data in the 24 hours ending 21 1645  Wt Readings from Last 2 Encounters:   11/30/21 235 lb (106.6 kg)   08/19/21 228 lb 9.6 oz (103.7 kg)     Constitutional: He is oriented to person, place, and time. He appears well-developed and well-nourished. In no acute distress. Head: Normocephalic and atraumatic. Neck: Neck supple. No JVD present. Carotid bruit is not present. No mass and no thyromegaly present. No lymphadenopathy present. Cardiovascular: Normal rate, regular rhythm, normal heart sounds and intact distal pulses. Exam reveals no gallop and no friction rub. No murmur heard. Pulmonary/Chest: Effort normal and breath sounds normal. No respiratory distress. He has no wheezes, rhonchi or rales. Abdominal: Soft, non-tender. Bowel sounds and aorta are normal. He exhibits no organomegaly, mass or bruit. Extremities: No edema, cyanosis, or clubbing. Pulses are 2+ radial/carotid/dorsalis pedis and posterior tibial bilaterally. Neurological: He is alert and oriented to person, place, and time. He has normal reflexes. No cranial nerve deficit. Coordination normal.   Skin: Skin is warm and dry. There is no rash or diaphoresis. Psychiatric: He has a normal mood and affect. His speech is normal and behavior is normal.     Personally reviewed and interpreted   EKG Interpretation 11/30/21: Normal sinus rhythm      Procedures:     [facility] 3/14/19  1.  Right-dominant coronary arterial system with 100% proximal RCA  occlusion that was successfully intervened upon with a 3 mm x 20-mm  Synergy drug-eluting stent dilated to 3.30 mm.  This restored RADHA 3  flow in the vessel with 0% residual stenosis.  There is 30% circumflex  disease and a 50% mid-LAD lesion but otherwise no obstructive lesions. There were collaterals to the right from the left. 2.  Normal left ventricular systolic function with LV ejection fraction  of 60%. 3.  Low left ventricular end-diastolic pressure at 6 to 8 mmHg. 4.  No gradient across the aortic valve on pullback to suggest aortic  stenosis.      Lab Review:   Lab Results   Component Value Date    TRIG 177 04/20/2021    HDL 56 04/20/2021    LDLCALC 69 04/20/2021    LABVLDL 35 04/20/2021     Lab Results   Component Value Date     04/20/2021    K 4.0 04/20/2021    K 4.0 03/27/2021    BUN 16 04/20/2021    CREATININE 1.1 04/20/2021         Assessment:  1. CAD of native coronary arteries without angina  2. Hyperlipidemia with LDL goal <70 mg/dL   3. Essential hypertension   4. Tobacco abuse, in remission      Plan:  He is not endorsing any symptoms representing angina and his blood pressure is well controlled today in the office. His most recent lipid profile was favorable on his current statin therapy. I have encouraged him to increase his aerobic activity as tolerated and adhere to a heart healthy diet. I have personally reviewed all previous testing for this visit today including imaging, lab results and EKG as detailed above. I will see him in the office for follow up in 1 year. This note was scribed in the presence of Vadim Dudley MD by [employer], RN. Physician Attestation:  The scribes documentation has been prepared under my direction and personally reviewed by me in its entirety. I, Dr. Baltazar Almendarez personally performed the services described in this documentation as scribed by my RN in my presence, and I confirm that the note above accurately reflects all work, treatment, procedures, and medical decision making performed by me.

## 2021-11-30 ENCOUNTER — OFFICE VISIT (OUTPATIENT)
Dept: CARDIOLOGY CLINIC | Age: 51
End: 2021-11-30
Payer: COMMERCIAL

## 2021-11-30 VITALS
HEIGHT: 69 IN | HEART RATE: 72 BPM | BODY MASS INDEX: 34.8 KG/M2 | OXYGEN SATURATION: 97 % | DIASTOLIC BLOOD PRESSURE: 72 MMHG | SYSTOLIC BLOOD PRESSURE: 124 MMHG | WEIGHT: 235 LBS

## 2021-11-30 DIAGNOSIS — E78.5 HYPERLIPIDEMIA LDL GOAL <70: ICD-10-CM

## 2021-11-30 DIAGNOSIS — I10 ESSENTIAL HYPERTENSION: ICD-10-CM

## 2021-11-30 DIAGNOSIS — I25.10 CORONARY ARTERY DISEASE INVOLVING NATIVE CORONARY ARTERY OF NATIVE HEART WITHOUT ANGINA PECTORIS: Primary | ICD-10-CM

## 2021-11-30 PROCEDURE — 93000 ELECTROCARDIOGRAM COMPLETE: CPT | Performed by: INTERNAL MEDICINE

## 2021-11-30 PROCEDURE — 99214 OFFICE O/P EST MOD 30 MIN: CPT | Performed by: INTERNAL MEDICINE

## 2021-12-30 ENCOUNTER — NURSE ONLY (OUTPATIENT)
Dept: PRIMARY CARE CLINIC | Age: 51
End: 2021-12-30

## 2021-12-30 DIAGNOSIS — Z11.52 ENCOUNTER FOR SCREENING FOR COVID-19: Primary | ICD-10-CM

## 2021-12-30 LAB — SARS-COV-2: DETECTED

## 2021-12-30 NOTE — PROGRESS NOTES
Davidduanelili Guerramiryam Wlikerson received a viral test for COVID-19. They were educated on isolation and quarantine as appropriate. For any symptoms, they were directed to seek care from their PCP, given contact information to establish with a doctor, directed to an urgent care or the emergency room.

## 2022-02-28 ENCOUNTER — OFFICE VISIT (OUTPATIENT)
Dept: ORTHOPEDIC SURGERY | Age: 52
End: 2022-02-28
Payer: COMMERCIAL

## 2022-02-28 VITALS — HEIGHT: 69 IN | WEIGHT: 242 LBS | RESPIRATION RATE: 12 BRPM | BODY MASS INDEX: 35.84 KG/M2

## 2022-02-28 DIAGNOSIS — M25.811 OS ACROMIALE OF RIGHT SHOULDER: ICD-10-CM

## 2022-02-28 DIAGNOSIS — S46.011A TRAUMATIC COMPLETE TEAR OF RIGHT ROTATOR CUFF, INITIAL ENCOUNTER: ICD-10-CM

## 2022-02-28 DIAGNOSIS — M25.511 ACUTE PAIN OF RIGHT SHOULDER: Primary | ICD-10-CM

## 2022-02-28 PROCEDURE — 99203 OFFICE O/P NEW LOW 30 MIN: CPT | Performed by: ORTHOPAEDIC SURGERY

## 2022-02-28 NOTE — PROGRESS NOTES
Dianna Chowdhury is seen today for evaluation of right shoulder pain. He has had pain intermittently for about 4 5 years. Is gotten much worse since he was injured go- carting in mid January 2022. His arm feels very fatigued. He feels like he needs to protect it. Reaching out or behind his back is painful and 8 out of 10. He is use Tylenol. He is right-hand dominant. Pain radiates toward the upper arm but he denies ongoing numbness or tingling. Left shoulder is not bothersome. He has a computer job. Past history significant for reflux, high blood pressure, high cholesterol, asthma. History: Patient's relevant past family, medical, and social history are reviewed as part of today's visit. ROS of pertinent positives and negatives as above; otherwise negative. General Exam:    Vitals: Resp. rate 12, height 5' 9\" (1.753 m), weight 242 lb (109.8 kg). Constitutional: Patient is adequately groomed with no evidence of malnutrition  Mental Status: The patient is oriented to time, place and person. The patient's mood and affect are appropriate. Gait:  Patient walks with normal gait and station. Lymphatic: The lymphatic examination bilaterally reveals all areas to be without enlargement or induration. Vascular: Examination reveals no swelling or calf tenderness. Peripheral pulses are palpable and 2+. Neurological: The patient has good coordination. There is no weakness or sensory deficit. Skin:    Head/Neck: inspection reveals no rashes, ulcerations or lesions. Trunk:  inspection reveals no rashes, ulcerations or lesions. Right Lower Extremity: inspection reveals no rashes, ulcerations or lesions. Left Lower Extremity: inspection reveals no rashes, ulcerations or lesions. Examination of the cervical spine reveals no restriction in motion. There are no reproduction of symptoms into either arm with flexion, extension, rotation or palpation.   The patient has a negative Spurling sign, and no

## 2022-03-07 ENCOUNTER — OFFICE VISIT (OUTPATIENT)
Dept: ORTHOPEDIC SURGERY | Age: 52
End: 2022-03-07
Payer: COMMERCIAL

## 2022-03-07 VITALS — BODY MASS INDEX: 35.84 KG/M2 | HEIGHT: 69 IN | RESPIRATION RATE: 12 BRPM | WEIGHT: 242 LBS

## 2022-03-07 DIAGNOSIS — M75.21 BICEPS TENDINITIS OF RIGHT SHOULDER: ICD-10-CM

## 2022-03-07 DIAGNOSIS — S43.432S GLENOID LABRUM TEAR, LEFT, SEQUELA: ICD-10-CM

## 2022-03-07 DIAGNOSIS — M25.511 RIGHT SHOULDER PAIN, UNSPECIFIED CHRONICITY: Primary | ICD-10-CM

## 2022-03-07 PROCEDURE — 20610 DRAIN/INJ JOINT/BURSA W/O US: CPT | Performed by: ORTHOPAEDIC SURGERY

## 2022-03-07 PROCEDURE — 99214 OFFICE O/P EST MOD 30 MIN: CPT | Performed by: ORTHOPAEDIC SURGERY

## 2022-03-07 RX ORDER — LIDOCAINE HYDROCHLORIDE 10 MG/ML
4 INJECTION, SOLUTION INFILTRATION; PERINEURAL ONCE
Status: SHIPPED | OUTPATIENT
Start: 2022-03-07

## 2022-03-07 RX ORDER — METHYLPREDNISOLONE ACETATE 40 MG/ML
80 INJECTION, SUSPENSION INTRA-ARTICULAR; INTRALESIONAL; INTRAMUSCULAR; SOFT TISSUE ONCE
Status: SHIPPED | OUTPATIENT
Start: 2022-03-07

## 2022-03-07 NOTE — PROGRESS NOTES
[facility]         [facility] right shoulder is reviewed. It demonstrates:       FINDINGS:  A large nondisplaced glenoid labral tear is present involving the superior,    posterior and posteroinferior glenoid labrum with possible subtle extension into anteroinferior    glenoid labrum/circumferential extension with an associated 1.4cm paralabral cyst adjacent to    the inferior glenoid labrum at the 6 to 7-o'clock position (coronal T2 fat-sat series 9, image    9 and sagittal T2 fat-sat series 7, image 8) and a 1.4cm paralabral cyst within the    spinoglenoid notch (axial PD fat-sat series 6, image 12, sagittal T2 fat-sat series 7 image 5    and coronal T2 fat-sat series 9 image 7 and 6).  There is no evidence of muscular denervation    of the infraspinatus muscle to suggest associated suprascapular nerve entrapment at this time.       There is mild to moderate-severity diffuse fatty infiltration of the teres minor muscle    (sagittal T1 series 8, images 3 through 11) concerning for a sequelae of quadrilateral space    syndrome (with no evidence of soft tissue mass within the quadrilateral space).       There is mild tendinosis and peritendinitis of the supraspinatus tendon which is intact without    tear.       A tiny 2-3mm low-grade (less than 1/3 thickness) partial-thickness interstitial tear of the    subscapularis tendon is present (sagittal T2 fat-sat series 7 image 13).        The infraspinatus and teres minor tendons are intact with no evidence of tendinosis or tendon    tear.  The long head of the biceps tendon is intact.       Mild lateral outlet stenosis is present secondary to inferolateral acromial tilt which could    predispose to rotator cuff impingement.       Mild acromioclavicular osteoarthritis is present with mild acromioclavicular spurring and a    small subchondral cyst within the acromion adjacent to the acromioclavicular joint with no    substantial associated medial outlet stenosis.       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       CONCLUSION:   1. A large nondisplaced glenoid labral tear is present involving the superior, posterior and    posteroinferior glenoid labrum (with possible subtle extension into the anteroinferior glenoid    labrum/circumferential extension).  An associated 1.4cm paralabral cyst is present adjacent to    the inferior glenoid labrum at the 6 to 7-o'clock position, and a 1.4cm paralabral cyst is also    present within the spinoglenoid notch (with no evidence of associated muscular denervation of    the infraspinatus muscle to suggest associated suprascapular nerve entrapment at this time). 2. Mild to moderate-severity diffuse fatty infiltration of the teres minor muscle concerning    for quadrilateral space syndrome (with no evidence of soft tissue mass within the quadrilateral    space). 3. Tiny low-grade (less than 1/3 thickness) partial-thickness interstitial tear of the distal    subscapularis tendon. 4. Mild tendinosis and peritendinitis of the supraspinatus tendon without supraspinatus tendon    tear. 5. Mild lateral outlet stenosis secondary to mild inferolateral acromial tilt which could    predispose to rotator cuff impingement. 6. Mild acromioclavicular osteoarthritis. 7. Os acromiale. In addition, in my opinion, there is high-grade inflammation surrounding the long head bicep tendon into the proximal humerus. Assessment: Ongoing right shoulder discomfort with glenoid labrum tear, os acromiale, and high-grade bicep tendinitis. Plan: At this point, he is not in position even consider surgical treatment. We will try an intra-articular cortisone injection followed by therapy. If he has ongoing symptoms we would again discussed the option of surgical decompression of the cyst, labrum repair, and biceps tenodesis.     Procedure: Under sterile technique, the right shoulder is injected anteriorly into the glenohumeral space with 80 mg of Depo-Medrol and 40 mg of lidocaine. He tolerated that well. Follow-up with me in 6 weeks.

## 2022-03-08 ENCOUNTER — TELEPHONE (OUTPATIENT)
Dept: ORTHOPEDIC SURGERY | Age: 52
End: 2022-03-08

## 2022-03-08 NOTE — TELEPHONE ENCOUNTER
General Question     Subject: PATIENT HAD A CORTISONE INJECTION INTO HIS RIGHT SHOULDER ON MARCH 7, 2022. HE STATES HE IS HAVING A THROBBING PAIN THAT IS NOT GOING AWAY. HE DOES NOT HAVE MUCH USE WITH THAT ARM. HE WOULD LIKE TO KNOW IF THIS IS NORMAL. PLEASE ADVISE.     Patient:  Cinda Khan \"Puneet\"  Contact Number: [phone]

## 2022-03-08 NOTE — TELEPHONE ENCOUNTER
Called and spoke with patient. He complains of right shoulder pain. Patient instructed to ice 15-20 minutes every hour as needed and take Tylenol OTC. Patient verbalized understanding.

## 2022-03-14 ENCOUNTER — EVALUATION (OUTPATIENT)
Dept: PHYSICAL THERAPY | Age: 52
End: 2022-03-14
Payer: COMMERCIAL

## 2022-03-14 DIAGNOSIS — M25.511 CHRONIC RIGHT SHOULDER PAIN: Primary | ICD-10-CM

## 2022-03-14 DIAGNOSIS — G89.29 CHRONIC RIGHT SHOULDER PAIN: Primary | ICD-10-CM

## 2022-03-14 PROCEDURE — 97110 THERAPEUTIC EXERCISES: CPT | Performed by: PHYSICAL THERAPIST

## 2022-03-14 PROCEDURE — 97530 THERAPEUTIC ACTIVITIES: CPT | Performed by: PHYSICAL THERAPIST

## 2022-03-14 PROCEDURE — 97161 PT EVAL LOW COMPLEX 20 MIN: CPT | Performed by: PHYSICAL THERAPIST

## 2022-03-14 NOTE — PROGRESS NOTES
Fady Lozano [facility]   Phone: [phone]    Fax: [phone]      Physical Therapy Treatment Note/ Progress Report:         Date:  3/14/2022    Patient Name:  Teodora Aiken    :  1970  MRN: <[id number]>  Restrictions/Precautions:    Medical/Treatment Diagnosis Information:  · Diagnosis: M25.511 R shoulder pain chronicity  ·  Treating Diagnosis: limited ROM, pain in shoulder, dec strength in shoulder, dyskinesia scapula  Insurance/Certification information:  PT Insurance Information: [employer]  Physician Information:  Referring Practitioner: Dr. Stephanie Mendoza  Has the plan of care been signed (Y/N):        []  Yes  [x]  No     Date of Patient follow up with Physician: 22      Is this a Progress Report:     []  Yes  [x]  No        If Yes:  Date Range for reporting period:  Beginning 3/14/22  Ending 22    Progress report will be due (10 Rx or 30 days whichever is less): 51      Recertification will be due (POC Duration  / 90 days whichever is less): 22        Visit # Insurance Allowable Auth Required   1 25 []  Yes []  No        Functional Scale: UEFI     Date assessed:  3/14/2022      Latex Allergy:  [x]NO      []YES  Preferred Language for Healthcare:   [x]English       []other:    Pain level:  4-10/10     SUBJECTIVE:  See eval    OBJECTIVE: See eval   Observation:    Test measurements:      RESTRICTIONS/PRECAUTIONS: HBP and cardiac concerns w/ stent implanted 2019    Exercises/Interventions:   Exercises:  Exercise/Equipment Resistance/Repetitions Comments   Cardio/Warm-up     Bike          Stretching/PROM     Wand ER @90 10\"x10  Supine AAROM flex 10\"x10    Table Slides     UE [place]     Pulleys     CBA in supine 10\"x10         STRENGTH     Isometrics     Retraction          Weight shift     Flexion     Abduction     External Rotation     Internal Rotation     Biceps     Triceps          PRE's     Flexion     Abduction     External Rotation     Internal Rotation     Shrugs     EXT     Reverse Flys     Serratus     Horizontal Abd with ER     Biceps     Triceps     Retraction          Cable Column/Theraband     External Rotation Supine, no $ 2x10 Cuing on tech   Internal Rotation     Shrugs     Lats     Ext     Flex     Scapular Retraction- horizontal Blue TB 2x10 Cuing for correct muscle contraction; blue TB given for home   BIC     TRIC     PNF          Neuromuscular Re-ed     Dynamic Stability                              Ed given on diagnosis, expectations & goals x10'                   Manual/Modalities                       Therapeutic Exercise and NMR EXR  [x] (62493) Provided verbal/tactile cueing for activities related to strengthening, flexibility, endurance, ROM  for improvements in scapular, scapulothoracic and UE control with self care, reaching, carrying, lifting, house/yardwork, driving/computer work.    [] (72779) Provided verbal/tactile cueing for activities related to improving balance, coordination, kinesthetic sense, posture, motor skill, proprioception  to assist with  scapular, scapulothoracic and UE control with self care, reaching, carrying, lifting, house/yardwork, driving/computer work. Therapeutic Activities:    [] (53760 or 56490) Provided verbal/tactile cueing for activities related to improving balance, coordination, kinesthetic sense, posture, motor skill, proprioception and motor activation to allow for proper function of scapular, scapulothoracic and UE control with self care, carrying, lifting, driving/computer work. Home Exercise Program:    [x] (88077) Reviewed/Progressed HEP activities related to strengthening, flexibility, endurance, ROM of scapular, scapulothoracic and UE control with self care, reaching, carrying, lifting, house/yardwork, driving/computer work     Written HEP est    Access Code: [email or web address]  URL: [email or web address]  Date: 03/14/2022  Prepared by: Arlet Maurer    Exercises  Supine Shoulder Flexion Extension AAROM with Dowel - 2 x daily - 7 x weekly - 1 sets - 10 reps - 10 hold  Supine Cross Body Shoulder Stretch - 2 x daily - 7 x weekly - 1 sets - 10 reps - 10 hold  Supine Shoulder External Rotation with Dowel - 2 x daily - 7 x weekly - 1 sets - 10 reps - 10 hold  Supine Shoulder Horizontal Abduction with Resistance - 2 x daily - 7 x weekly - 1 sets - 10 reps  Shoulder External Rotation and Scapular Retraction with Resistance - 2 x daily - 7 x weekly - 1 sets - 10 reps    [] (34518) Reviewed/Progressed HEP activities related to improving balance, coordination, kinesthetic sense, posture, motor skill, proprioception of scapular, scapulothoracic and UE control with self care, reaching, carrying, lifting, house/yardwork, driving    /computer work      Manual Treatments:  PROM / STM / Oscillations-Mobs:  G-I, II, III, IV (PA's, Inf., Post.)  [] ([id number]) Provided manual therapy to mobilize soft tissue/joints of cervical/CT, scapular GHJ and UE for the purpose of modulating pain, promoting relaxation,  increasing ROM, reducing/eliminating soft tissue swelling/inflammation/restriction, improving soft tissue extensibility and allowing for proper ROM for normal function with self care, reaching, carrying, lifting, house/yardwork, driving/computer work    Modalities: CP x10 min     [] GAME READY (VASO)- for significant edema, swelling, pain control. Charges:  Timed Code Treatment Minutes: 40   Total Treatment Minutes: 70     [x] EVAL (LOW) 20462 (typically 20 minutes face-to-face)  [] EVAL (MOD) 78068 (typically 30 minutes face-to-face)  [] EVAL (HIGH) 55750 (typically 45 minutes face-to-face)  [] RE-EVAL   [x] DX(18563)4 x 25'    [] IONTO  [] NMR (52264) x     [] VASO  [] Manual (55654) x      [] Other:  [x] TA (40308)5 x 15'     [] Mech Traction (65425)  [] ES(attended) (84255)      [] ES (un) (24593):       GOALS:  Patient stated goal: use dominant UE w/o deviations or pain in shoulder. []?  Progressing: []? Met: []? Not Met: []? Adjusted     Therapist goals for Patient:   Short Term Goals: To be achieved in: 2 weeks  1. Independent in HEP and progression per patient tolerance, in order to prevent re-injury. []? Progressing: []? Met: []? Not Met: []? Adjusted  2. Patient will have a decrease in pain to facilitate improvement in movement, function, and ADLs as indicated by Functional Deficits. []? Progressing: []? Met: []? Not Met: []? Adjusted     Long Term Goals: To be achieved in: 12 weeks  1. Disability index score of 15% or less for the DASH to assist with reaching prior level of function. []? Progressing: []? Met: []? Not Met: []? Adjusted  2. Patient will demonstrate increased AROM to WNL's to allow for proper joint functioning as indicated by patients Functional Deficits. []? Progressing: []? Met: []? Not Met: []? Adjusted  3. Patient will demonstrate an increase in Strength to 5/5 to allow for proper functional mobility as indicated by patients Functional Deficits. []? Progressing: []? Met: []? Not Met: []? Adjusted  4. Patient will return to all functional activities without increased symptoms or restriction. []? Progressing: []? Met: []? Not Met: []? Adjusted  5. Pt will be able to return to playing basketball without problems. (patient specific functional goal)    []? Progressing: []? Met: []? Not Met: []? Adjusted          Overall Progression Towards Functional goals/ Treatment Progress Update:  [] Patient is progressing as expected towards functional goals listed. [] Progression is slowed due to complexities/Impairments listed. [] Progression has been slowed due to co-morbidities.   [x] Plan just implemented, too soon to assess goals progression <30days   [] Goals require adjustment due to lack of progress  [] Patient is not progressing as expected and requires additional follow up with physician  [] Other    ASSESSMENT:  See eval    Treatment/Activity Tolerance:  [x] Patient tolerated

## 2022-03-14 NOTE — PROGRESS NOTES
[facility]   Phone: [phone]    Fax: [phone]        Physical Therapy Certification      Dear Referring Practitioner: Dr. Tom Tomas,    We had the pleasure of evaluating the following patient for physical therapy services at [address] [place] [address]. A summary of our findings can be found in the initial assessment below. This includes our plan of care. If you have any questions or concerns regarding these findings, please do not hesitate to contact me at the office phone number checked above. Thank you for the referral.       Physician Signature:_______________________________Date:__________________  By signing above (or electronic signature), therapists plan is approved by physician      Patient: Filipe Wilkerson   : 1970   MRN: <[id number]>  Referring Physician: Referring Practitioner: Dr. Tom Tomas      Evaluation Date: 3/14/2022      Medical Diagnosis Information:  Diagnosis: M25.511 R shoulder pain chronicity                                             Insurance information: PT Insurance Information: [employer]    Precautions/ Contra-indications: HBP and cardiac  Latex Allergy:  [x]NO      []YES  Preferred Language for Healthcare:   [x]English       []Other:    C-SSRS Triggered by Intake questionnaire (Past 2 wk assessment):   [x] No, Questionnaire did not trigger screening.   [] Yes, Patient intake triggered further evaluation      [] C-SSRS Screening completed  [] PCP notified via Plan of Care  [] Emergency services notified     SUBJECTIVE: Patient stated complaint: R shoulder pain. Pt has been having pain in R shoulder x4 yrs w/o known injury that was noted when shooting basketball. He went on go carting excursion in  that resulted in R shoulder pain. Pain has not stopped since . Pain is located in lat shoulder joint and extending down ant arm. Pt did get injection on 3/7/22 that had tightness and pain afterwards. RHD.  Pt lives w/ family. Pt does have abdominal pain for a year w/o known cause that is bothering him. Trial of TENS that did not make a difference. 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 A large nondisplaced glenoid labral tear is present involving the superior, posterior and    posteroinferior glenoid labrum (with possible subtle extension into the anteroinferior glenoid    labrum/circumferential extension).  An associated 1.4cm paralabral cyst is present adjacent to    the inferior glenoid labrum at the 6 to 7-o'clock position, and a 1.4cm paralabral cyst is also    present within the spinoglenoid notch (with no evidence of associated muscular denervation of    the infraspinatus muscle to suggest associated suprascapular nerve entrapment at this time). 2. Mild to moderate-severity diffuse fatty infiltration of the teres minor muscle concerning    for quadrilateral space syndrome (with no evidence of soft tissue mass within the quadrilateral    space). 3. Tiny low-grade (less than 1/3 thickness) partial-thickness interstitial tear of the distal    subscapularis tendon. 4. Mild tendinosis and peritendinitis of the supraspinatus tendon without supraspinatus tendon    tear.    5. Mild lateral outlet stenosis secondary to mild inferolateral acromial tilt which could    predispose to rotator cuff impingement. 6. Mild acromioclavicular osteoarthritis. 7. Os acromiale. OBJECTIVE:   Neck ROM: FB WNL NE, BB 75% NE, rotation WNL's NE, SB 75% NE;    ROM PROM AROM  Comment    L R L R 3/14/2022   Flexion 180 170 130 deg 85! Abduction 180 175 150 deg 75! ER 90 85 T1 T1! IR 20 15! T7 T12! Other        Other             Strength L R Comment   Flexion 5 2+! 3/14/2022   Abduction 5 2+! ER 5 4    IR 5 4    Supraspinatus      Upper Trap      Lower Trap      Mid Trap      Rhomboids      Biceps 5 4+    Triceps 5 4+    Horizontal Abduction      Horizontal Adduction      Lats          Special Tests Results/Comment   Estefania Byrd positive   Speeds neg   OBriens    Other    Neurologic Signs    Functional Reach       Reflexes/Sensation:    [x]Dermatomes/Myotomes intact    [x]Reflexes equal and normal bilaterally   []Other:    Joint mobility:    []Normal    [x]Hypo end range for R shoulder;   []Hyper    Palpation: tightness noted in pec, scapular muscles, infraspinatus, supraspinatus, levator and UT R>L; Functional Mobility/Transfers: indep in transfers from all positions on eval;    Posture: kyphotic posture w/ rounded shoulders and FHP in upright position; ectomorph body structure;     Bandages/Dressings/Incisions: n/a;    Gait: (include devices/WB status): WNL but dec arm swing and trunk rotation w/ gt pattern;    Orthopedic Special Tests: see above;                       [x] Patient history, allergies, meds reviewed. Medical chart reviewed. See intake form. Review Of Systems (ROS):  [x]Performed Review of systems (Integumentary, CardioPulmonary, Neurological) by intake and observation. Intake form has been scanned into medical record. Patient has been instructed to contact their primary care physician regarding ROS issues if not already being addressed at this time.       Co-morbidities/Complexities (which will affect course of rehabilitation):   []None           Arthritic conditions   []Rheumatoid arthritis (M05.9)  []Osteoarthritis (M19.91)   Cardiovascular conditions   [x]Hypertension (I10)  []Hyperlipidemia (E78.5)  []Angina pectoris (I20)  []Atherosclerosis (I70)  Stent 2019 Musculoskeletal conditions   []Disc pathology   []Congenital spine pathologies   []Prior surgical intervention  []Osteoporosis (M81.8)  []Osteopenia (M85.8)   Endocrine conditions   []Hypothyroid (E03.9)  []Hyperthyroid Gastrointestinal conditions   []Constipation (N23.60)   Metabolic conditions   []Morbid obesity (E66.01)  []Diabetes type 1(E10.65) or 2 (E11.65)   []Neuropathy (G60.9)     Pulmonary conditions   []Asthma (J45)  []Coughing   []COPD (J44.9)   Psychological Disorders  []Anxiety (F41.9)  []Depression (F32.9)   []Other:   []Other: COVID 2021         Barriers to/and or personal factors that will affect rehab potential:              []Age  []Sex              [x]Motivation/Lack of Motivation                        [x]Co-Morbidities              [x]Cognitive Function, education/learning barriers              []Environmental, home barriers              []profession/work barriers  []past PT/medical experience  []other:  Justification:      Falls Risk Assessment (30 days):   [x] Falls Risk assessed and no intervention required. [] Falls Risk assessed and Patient requires intervention due to being higher risk   TUG score (>12s at risk):     [] Falls education provided, including       ASSESSMENT: Pt is 48 y/o male w/ chronic R shoulder pain. Pt has ant R shoulder pain that is present w/ all active movements of extremity. Pt has limited strength due to pain in shoulder. Poor scapular control and posture affecting shoulder movements. Pt should benefit from therapy to address deficits and reach goals.      Functional Impairments   [x]Noted spinal or UE joint hypomobility   []Noted spinal or UE joint hypermobility   [x]Decreased UE functional ROM   [x]Decreased UE functional strength   [x]Abnormal reflexes/sensation/myotomal/dermatomal deficits   [x]Decreased RC/scapular/core strength and neuromuscular control   []other:      Functional Activity Limitations (from functional questionnaire and intake)   [x]Reduced ability to tolerate prolonged functional positions   [x]Reduced ability or difficulty with changes of positions or transfers between positions   [x]Reduced ability to maintain good posture and demonstrate good body mechanics with sitting, bending, and lifting   [x] Reduced ability or tolerance with driving and/or computer work   [x]Reduced ability to sleep   [x]Reduced ability to perform lifting, reaching, carrying tasks   [x]Reduced ability to tolerate impact through UE   [x]Reduced ability to reach behind back   [x]Reduced ability to  or hold objects   [x]Reduced ability to throw or toss an object   []other:    Participation Restrictions   [x]Reduced participation in self care activities   [x]Reduced participation in home management activities   [x]Reduced participation in work activities   [x]Reduced participation in social activities. [x]Reduced participation in sport/recreation activities. Classification:   []Signs/symptoms consistent with post-surgical status including decreased ROM, strength and function.   [x]Signs/symptoms consistent with joint sprain/strain   [x]Signs/symptoms consistent with shoulder impingement   [x]Signs/symptoms consistent with shoulder/elbow/wrist tendinopathy   [x]Signs/symptoms consistent with Rotator cuff tear   [x]Signs/symptoms consistent with labral tear   []Signs/symptoms consistent with postural dysfunction    []Signs/symptoms consistent with Glenohumeral IR Deficit - <45 degrees   []Signs/symptoms consistent with facet dysfunction of cervical/thoracic spine    []Signs/symptoms consistent with pathology which may benefit from Dry needling     []other:     Prognosis/Rehab Potential: []Excellent   [x]Good    []Fair   []Poor    Tolerance of evaluation/treatment:    []Excellent   []Good    [x]Fair   []Poor    Physical Therapy Evaluation Complexity Justification  [x] A history of present problem with:  [] no personal factors and/or comorbidities that impact the plan of care;  []1-2 personal factors and/or comorbidities that impact the plan of care  [x]3 personal factors and/or comorbidities that impact the plan of care  [x] An examination of body systems using standardized tests and measures addressing any of the following: body structures and functions (impairments), activity limitations, and/or participation restrictions;:  [] a total of 1-2 or more elements   [] a total of 3 or more elements   [x] a total of 4 or more elements   [x] A clinical presentation with:  [] stable and/or uncomplicated characteristics   [x] evolving clinical presentation with changing characteristics  [] unstable and unpredictable characteristics;   [x] Clinical decision making of [] low, [x] moderate, [] high complexity using standardized patient assessment instrument and/or measurable assessment of functional outcome. [] EVAL (LOW) 86042 (typically 20 minutes face-to-face)  [x] EVAL (MOD) 81593 (typically 30 minutes face-to-face)  [] EVAL (HIGH) 57857 (typically 45 minutes face-to-face)  [] RE-EVAL     PLAN:  Frequency/Duration:  2 days per week for 6 Weeks:  INTERVENTIONS:  [x] Therapeutic exercise including: strength training, ROM, for Upper extremity and core   [x]  NMR activation and proprioception for UE, scap and Core   [x] Manual therapy as indicated for shoulder, scapula and spine to include: Dry Needling/IASTM, STM, PROM, Gr I-IV mobilizations, manipulation. [x] Modalities as needed that may include: thermal agents, E-stim, Biofeedback, US, iontophoresis as indicated  [x] Patient education on joint protection, postural re-education, activity modification, progression of HEP.     HEP instruction: Instructed patient in a HEP. Patient demonstrated exercises correctly. Handout with  pictures and # of reps/sets was given to pt. Exercises included those listed above. PT's business card with information given to pt for any questions or problems that may occur before next visit. GOALS:  Patient stated goal: use dominant UE w/o deviations or pain in shoulder. [] Progressing: [] Met: [] Not Met: [] Adjusted    Therapist goals for Patient:   Short Term Goals: To be achieved in: 2 weeks  1. Independent in HEP and progression per patient tolerance, in order to prevent re-injury. [] Progressing: [] Met: [] Not Met: [] Adjusted  2. Patient will have a decrease in pain to facilitate improvement in movement, function, and ADLs as indicated by Functional Deficits. [] Progressing: [] Met: [] Not Met: [] Adjusted    Long Term Goals: To be achieved in: 12 weeks  1. Disability index score of 15% or less for the DASH to assist with reaching prior level of function. [] Progressing: [] Met: [] Not Met: [] Adjusted  2. Patient will demonstrate increased AROM to WNL's to allow for proper joint functioning as indicated by patients Functional Deficits. [] Progressing: [] Met: [] Not Met: [] Adjusted  3. Patient will demonstrate an increase in Strength to 5/5 to allow for proper functional mobility as indicated by patients Functional Deficits. [] Progressing: [] Met: [] Not Met: [] Adjusted  4. Patient will return to all functional activities without increased symptoms or restriction. [] Progressing: [] Met: [] Not Met: [] Adjusted  5.  Pt will be able to return to playing basketball without problems. (patient specific functional goal)    [] Progressing: [] Met: [] Not Met: [] Adjusted     Electronically signed by:  China Villanueva PT, Derrick Knight 88, OMT-C    Physical Therapist [address] [place] [address] license #[id number]  Physical Therapist [place] license #[id number]

## 2022-03-16 ENCOUNTER — TREATMENT (OUTPATIENT)
Dept: PHYSICAL THERAPY | Age: 52
End: 2022-03-16
Payer: COMMERCIAL

## 2022-03-16 DIAGNOSIS — M25.511 CHRONIC RIGHT SHOULDER PAIN: Primary | ICD-10-CM

## 2022-03-16 DIAGNOSIS — G89.29 CHRONIC RIGHT SHOULDER PAIN: Primary | ICD-10-CM

## 2022-03-16 PROCEDURE — 97110 THERAPEUTIC EXERCISES: CPT | Performed by: PHYSICAL THERAPIST

## 2022-03-16 PROCEDURE — 97140 MANUAL THERAPY 1/> REGIONS: CPT | Performed by: PHYSICAL THERAPIST

## 2022-03-16 PROCEDURE — 97530 THERAPEUTIC ACTIVITIES: CPT | Performed by: PHYSICAL THERAPIST

## 2022-03-16 NOTE — PROGRESS NOTES
[facility] L[facility]   Phone: [phone]    Fax: [phone]      Physical Therapy Treatment Note/ Progress Report:         Date:  3/16/2022    Patient Name:  Monserrat Rojas    :  1970  MRN: <[id number]>  Restrictions/Precautions:    Medical/Treatment Diagnosis Information:  · Diagnosis: M25.511 R shoulder pain chronicity  ·  Treating Diagnosis: limited ROM, pain in shoulder, dec strength in shoulder, dyskinesia scapula  Insurance/Certification information:  PT Insurance Information: [employer]  Physician Information:  Referring Practitioner: Dr. Hnerik Rogers  Has the plan of care been signed (Y/N):        []  Yes  [x]  No     Date of Patient follow up with Physician: 22      Is this a Progress Report:     []  Yes  [x]  No        If Yes:  Date Range for reporting period:  Beginning 3/14/22  Ending 22    Progress report will be due (10 Rx or 30 days whichever is less):       Recertification will be due (POC Duration  / 90 days whichever is less): 22        Visit # Insurance Allowable Auth Required   2 25 []  Yes []  No        Functional Scale: UEFI     Date assessed:  3/14/2022      Latex Allergy:  [x]NO      []YES  Preferred Language for Healthcare:   [x]English       []other:    Pain level:  4-10/10     SUBJECTIVE:  Pt has been sore after starting therapy program and painful. He feels that his movement has improved. He has been icing regularly. He has had trouble moving arm active assisted into flex this week. Relevant Medical History: HBP and cardiac condition of stent ; Covid 2021; hx of back fracture  Functional Disability Index:      Pain Scale: 4-10/10  Easing factors: activity modification;   Provocative factors: shooting basketball;      Type: [x]? Constant       []? Intermittent  [x]? Radiating     []? Localized     []? 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     Abduction 180 175 150 deg 75!     ER 90 85 T1 T1!     IR 20 15! T7 T12!     Other             Other                    Strength L R Comment   Flexion 5 2+! 3/14/2022   Abduction 5 2+!     ER 5 4     IR 5 4     Supraspinatus         Upper Trap         Lower Trap         Mid Trap         Rhomboids         Biceps 5 4+     Triceps 5 4+     Horizontal Abduction         Horizontal Adduction         Lats               Special Tests Results/Comment   Estefania Byrd positive   Speeds neg   OBriens     Other     Neurologic Signs     Functional Reach        Reflexes/Sensation:               [x]? Dermatomes/Myotomes intact               [x]? Reflexes equal and normal bilaterally              []?Other:     Joint mobility:               []?Normal                      [x]? Hypo end range for R shoulder;              []?Hyper     Palpation: tightness noted in pec, scapular muscles, infraspinatus, supraspinatus, levator and UT R>L;     Functional Mobility/Transfers: indep in transfers from all positions on eval;     Posture: kyphotic posture w/ rounded shoulders and FHP in upright position; ectomorph body structure;       Test measurements:      RESTRICTIONS/PRECAUTIONS: HBP and cardiac concerns w/ stent implanted 2019    Exercises/Interventions:   Exercises:  Exercise/Equipment Resistance/Repetitions Comments   Cardio/Warm-up     Bike          Stretching/PROM     Wand ER @90 10\"x10  Supine AAROM flex 10\"x10   3/16/22 modified angle to avoid pain    Counter stretch in standing 10\"x10 Table Slides     UE [place]     Pulleys     CBA 10\"x10 3/16/22 Rolling over shoulder in side lying position        STRENGTH     Isometrics     Retraction          Weight shift     Flexion     Abduction     External Rotation     Internal Rotation     Biceps     Triceps          PRE's     Flexion     Abduction     External Rotation     Internal Rotation     Shrugs     EXT     Reverse Flys     Serratus     Horizontal Abd with ER     Biceps     Triceps     Retraction Prone x15 5\" hold Cuing for correct tech        Cable Column/Theraband     External Rotation Supine, no $ 2x10 blue Cuing on tech   Internal Rotation     Shrugs     Lats     Ext     Flex     Scapular Retraction- horizontal Blue TB 2x10 Cuing for correct muscle contraction; blue TB given for home   Scapular retraction Blue TB 2x10    BIC     TRIC     PNF          Neuromuscular Re-ed     Dynamic Stability                              Ed given on diagnosis, expectations & goals x10'                   Manual/Modalities                       Therapeutic Exercise and NMR EXR  [x] (99296) Provided verbal/tactile cueing for activities related to strengthening, flexibility, endurance, ROM  for improvements in scapular, scapulothoracic and UE control with self care, reaching, carrying, lifting, house/yardwork, driving/computer work.    [] (34462) Provided verbal/tactile cueing for activities related to improving balance, coordination, kinesthetic sense, posture, motor skill, proprioception  to assist with  scapular, scapulothoracic and UE control with self care, reaching, carrying, lifting, house/yardwork, driving/computer work.     Therapeutic Activities:    [] (88561 or 88198) Provided verbal/tactile cueing for activities related to improving balance, coordination, kinesthetic sense, posture, motor skill, proprioception and motor activation to allow for proper function of scapular, scapulothoracic and UE control with self care, carrying, lifting, driving/computer work. Home Exercise Program:    [x] (59465) Reviewed/Progressed HEP activities related to strengthening, flexibility, endurance, ROM of scapular, scapulothoracic and UE control with self care, reaching, carrying, lifting, house/yardwork, driving/computer work     Written HEP est    Access Code: [email or web address]  URL: [email or web address]  Date: 03/14/2022  Prepared by: Adele Cortez    Exercises  Supine Shoulder Flexion Extension AAROM with Dowel - 2 x daily - 7 x weekly - 1 sets - 10 reps - 10 hold  Supine Cross Body Shoulder Stretch - 2 x daily - 7 x weekly - 1 sets - 10 reps - 10 hold  Supine Shoulder External Rotation with Dowel - 2 x daily - 7 x weekly - 1 sets - 10 reps - 10 hold  Supine Shoulder Horizontal Abduction with Resistance - 2 x daily - 7 x weekly - 1 sets - 10 reps  Shoulder External Rotation and Scapular Retraction with Resistance - 2 x daily - 7 x weekly - 1 sets - 10 reps    [] (86325) Reviewed/Progressed HEP activities related to improving balance, coordination, kinesthetic sense, posture, motor skill, proprioception of scapular, scapulothoracic and UE control with self care, reaching, carrying, lifting, house/yardwork, driving    /computer work      Manual Treatments:  PROM / STM / Oscillations-Mobs:  G-I, II, III, IV (PA's, Inf., Post.)  [] ([id number]) Provided manual therapy to mobilize soft tissue/joints of cervical/CT, scapular GHJ and UE for the purpose of modulating pain, promoting relaxation,  increasing ROM, reducing/eliminating soft tissue swelling/inflammation/restriction, improving soft tissue extensibility and allowing for proper ROM for normal function with self care, reaching, carrying, lifting, house/yardwork, driving/computer work    Modalities: CP x10 min     [] GAME READY (VASO)- for significant edema, swelling, pain control.        Charges:  Timed Code Treatment Minutes: 45   Total Treatment Minutes: 55     [] EVAL (LOW) 79417 (typically 20 minutes face-to-face)  [] EVAL (MOD) 84050 (typically 30 minutes face-to-face)  [] EVAL (HIGH) 53464 (typically 45 minutes face-to-face)  [] RE-EVAL   [x] RI(45514)1 x 20'    [] IONTO  [] NMR (88124) x     [] VASO  [x] Manual (64469)1x 10'      [] Other:  [x] TA (62497)6 x 15'     [] Mech Traction (15414)  [] ES(attended) (57779)      [] ES (un) (10252):       GOALS:  Patient stated goal: use dominant UE w/o deviations or pain in shoulder. []? Progressing: []? Met: []? Not Met: []? Adjusted     Therapist goals for Patient:   Short Term Goals: To be achieved in: 2 weeks  1. Independent in HEP and progression per patient tolerance, in order to prevent re-injury. []? Progressing: []? Met: []? Not Met: []? Adjusted  2. Patient will have a decrease in pain to facilitate improvement in movement, function, and ADLs as indicated by Functional Deficits. []? Progressing: []? Met: []? Not Met: []? Adjusted     Long Term Goals: To be achieved in: 12 weeks  1. Disability index score of 15% or less for the DASH to assist with reaching prior level of function. []? Progressing: []? Met: []? Not Met: []? Adjusted  2. Patient will demonstrate increased AROM to WNL's to allow for proper joint functioning as indicated by patients Functional Deficits. []? Progressing: []? Met: []? Not Met: []? Adjusted  3. Patient will demonstrate an increase in Strength to 5/5 to allow for proper functional mobility as indicated by patients Functional Deficits. []? Progressing: []? Met: []? Not Met: []? Adjusted  4. Patient will return to all functional activities without increased symptoms or restriction. []? Progressing: []? Met: []? Not Met: []? Adjusted  5. Pt will be able to return to playing basketball without problems. (patient specific functional goal)    []? Progressing: []? Met: []? Not Met: []?  Adjusted          Overall Progression Towards Functional goals/ Treatment Progress Update:  [] Patient is progressing as expected towards functional goals listed. [] Progression is slowed due to complexities/Impairments listed. [] Progression has been slowed due to co-morbidities. [x] Plan just implemented, too soon to assess goals progression <30days   [] Goals require adjustment due to lack of progress  [] Patient is not progressing as expected and requires additional follow up with physician  [] Other    ASSESSMENT: ROM in shoulder was better today after initiation of HEP. Exercises were modified to avoid pain. Primary pain compliant today was in bicep tendon on R. Pt needed cuing for scapular retraction but able to isolate muscle w/ cuing. Reviewed importance of cuing. No change in HEP to allow pt to adjust to exercises and progress strength. Treatment/Activity Tolerance:  [x] Patient tolerated treatment well [] Patient limited by fatique  [] Patient limited by pain  [] Patient limited by other medical complications  [] Other:     Prognosis: [x] Good [] Fair  [] Poor    Patient Requires Follow-up: [x] Yes  [] No    PLAN: See eval  [x] Continue per plan of care [] Alter current plan (see comments)  [x] Plan of care initiated [] Hold pending MD visit [] Discharge    Electronically signed by: Hailee House PT MS, OMT-C      Physical Therapist [facility] license #[id number]  Physical Therapist [place] license #[id number]     Note: If patient does not return for scheduled/ recommended follow up visits, this note will serve as a discharge from care along with most recent update on progress.

## 2022-03-21 ENCOUNTER — TREATMENT (OUTPATIENT)
Dept: PHYSICAL THERAPY | Age: 52
End: 2022-03-21
Payer: COMMERCIAL

## 2022-03-21 DIAGNOSIS — M25.511 CHRONIC RIGHT SHOULDER PAIN: Primary | ICD-10-CM

## 2022-03-21 DIAGNOSIS — G89.29 CHRONIC RIGHT SHOULDER PAIN: Primary | ICD-10-CM

## 2022-03-21 PROCEDURE — 97140 MANUAL THERAPY 1/> REGIONS: CPT | Performed by: PHYSICAL THERAPIST

## 2022-03-21 PROCEDURE — 97530 THERAPEUTIC ACTIVITIES: CPT | Performed by: PHYSICAL THERAPIST

## 2022-03-21 PROCEDURE — 97110 THERAPEUTIC EXERCISES: CPT | Performed by: PHYSICAL THERAPIST

## 2022-03-21 NOTE — PROGRESS NOTES
[facility]   Phone: [phone]    Fax: [phone]      Physical Therapy Treatment Note/ Progress Report:         Date:  3/21/2022    Patient Name:  Ebbie Riedel    :  1970  MRN: [id number]  Restrictions/Precautions:    Medical/Treatment Diagnosis Information:  · Diagnosis: M25.511 R shoulder pain chronicity  ·  Treating Diagnosis: limited ROM, pain in shoulder, dec strength in shoulder, dyskinesia scapula  Insurance/Certification information:  PT Insurance Information: Humana  Physician Information:  Referring Practitioner: Dr. Facundo James  Has the plan of care been signed (Y/N):        []  Yes  [x]  No     Date of Patient follow up with Physician: 22      Is this a Progress Report:     []  Yes  [x]  No        If Yes:  Date Range for reporting period:  Beginning 3/14/22  Ending 22    Progress report will be due (10 Rx or 30 days whichever is less):       Recertification will be due (POC Duration  / 90 days whichever is less): 22        Visit # Insurance Allowable Auth Required   3 25 []  Yes []  No        Functional Scale: UEFI     Date assessed:  3/14/2022      Latex Allergy:  [x]NO      []YES  Preferred Language for Healthcare:   [x]English       []other:    Pain level:  4-10/10     SUBJECTIVE:  Pt states that pain has improved in shoulder and he is able to move his shoulder further through ranges. He has been compliant w/ HEP. Pt visibly raised R UE to ~140 deg on arrival. Pt able to lift his arm to keyboard for typing now w/o assistance from L UE and reach behind him to thread his belt in loop. Relevant Medical History: HBP and cardiac condition of stent ; Covid 2021; hx of back fracture  Functional Disability Index:      Pain Scale: 4-10/10  Easing factors: activity modification;   Provocative factors: shooting basketball;      Type: [x]? Constant       []? Intermittent  [x]? Radiating     []? Localized     []? 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     Abduction 180 175 150 deg 75!     ER 90 90 T1 T1!     IR 20 30! T7 T12!     Other             Other                    Strength L R Comment   Flexion 5 2+! 3/14/2022   Abduction 5 2+!     ER 5 4     IR 5 4     Supraspinatus         Upper Trap         Lower Trap         Mid Trap         Rhomboids         Biceps 5 4+     Triceps 5 4+     Horizontal Abduction         Horizontal Adduction         Lats               Special Tests Results/Comment   Estefania Byrd positive   Speeds neg   OBriens     Other     Neurologic Signs     Functional Reach        Reflexes/Sensation:               [x]? Dermatomes/Myotomes intact               [x]? Reflexes equal and normal bilaterally              []?Other:     Joint mobility:               []?Normal                      [x]? Hypo end range for R shoulder;              []?Hyper     Palpation: tightness noted in pec, scapular muscles, infraspinatus, supraspinatus, levator and UT R>L;     Functional Mobility/Transfers: indep in transfers from all positions on eval;     Posture: kyphotic posture w/ rounded shoulders and FHP in upright position; ectomorph body structure;       Test measurements:      RESTRICTIONS/PRECAUTIONS: HBP and cardiac concerns w/ stent implanted 2019    Exercises/Interventions:   Exercises:  Exercise/Equipment Resistance/Repetitions Comments   Cardio/Warm-up     Bike          Stretching/PROM     Wand ER @90 10\"x10  Supine AAROM flex 10\"x10   3/21/22 no modifications needed for motion today   Counter stretch in standing     Table Slides     UE [place]     Pulleys     CBA 10\"x10 3/16/22 Rolling over shoulder in side lying position        STRENGTH     Isometrics     Retraction          Weight shift     Flexion     Abduction     External Rotation     Internal Rotation     Biceps     Triceps          PRE's     Flexion 2# 2x10    Abduction 2# 2x10    External Rotation     Internal Rotation     Shrugs     EXT     Reverse Flys     Serratus     Horizontal Abd with ER     Biceps     Triceps     Retraction Prone x15 5\" hold     Prone w/ wts NPV    Cable Column/Theraband     External Rotation Supine, no $ 2x10 blue Cuing on tech    Side lying wt NPV    Internal Rotation     Shrugs     Lats     Ext     Flex     Scapular Retraction- horizontal Silver TB 2x10  3/21/22 silver TB givenfor home    Consider prone NPV    Scapular retraction Silver TB 2x10    BIC     TRIC     PNF          Neuromuscular Re-ed     Dynamic Stability     Ball vs wall x30 up/down & side to side Mod fatigue noted                       Ed given on diagnosis, expectations & goals X10'; 3/21/22 reviewed and updated                   Manual/Modalities                       Therapeutic Exercise and NMR EXR  [x] (75414) Provided verbal/tactile cueing for activities related to strengthening, flexibility, endurance, ROM  for improvements in scapular, scapulothoracic and UE control with self care, reaching, carrying, lifting, house/yardwork, driving/computer work.    [] (36892) Provided verbal/tactile cueing for activities related to improving balance, coordination, kinesthetic sense, posture, motor skill, proprioception  to assist with  scapular, scapulothoracic and UE control with self care, reaching, carrying, lifting, house/yardwork, driving/computer work.     Therapeutic Activities:    [] (22417 or 91661) Provided verbal/tactile cueing for activities related to improving balance, coordination, kinesthetic sense, posture, motor skill, proprioception and motor activation to allow for proper function of scapular, scapulothoracic and UE control with self care, carrying, lifting, driving/computer work. Home Exercise Program:    [x] (70009) Reviewed/Progressed HEP activities related to strengthening, flexibility, endurance, ROM of scapular, scapulothoracic and UE control with self care, reaching, carrying, lifting, house/yardwork, driving/computer work     Written HEP est    Access Code: [email or web address]  URL: [email or web address]  Date: 03/14/2022  Prepared by: Justino Sheffield    Exercises  Supine Shoulder Flexion Extension AAROM with Dowel - 2 x daily - 7 x weekly - 1 sets - 10 reps - 10 hold  Supine Cross Body Shoulder Stretch - 2 x daily - 7 x weekly - 1 sets - 10 reps - 10 hold  Supine Shoulder External Rotation with Dowel - 2 x daily - 7 x weekly - 1 sets - 10 reps - 10 hold  Supine Shoulder Horizontal Abduction with Resistance - 2 x daily - 7 x weekly - 1 sets - 10 reps  Shoulder External Rotation and Scapular Retraction with Resistance - 2 x daily - 7 x weekly - 1 sets - 10 reps    [] (38904) Reviewed/Progressed HEP activities related to improving balance, coordination, kinesthetic sense, posture, motor skill, proprioception of scapular, scapulothoracic and UE control with self care, reaching, carrying, lifting, house/yardwork, driving    /computer work      Manual Treatments:  PROM / STM / Oscillations-Mobs:  G-I, II, III, IV (PA's, Inf., Post.)  [] ([id number]) Provided manual therapy to mobilize soft tissue/joints of cervical/CT, scapular GHJ and UE for the purpose of modulating pain, promoting relaxation,  increasing ROM, reducing/eliminating soft tissue swelling/inflammation/restriction, improving soft tissue extensibility and allowing for proper ROM for normal function with self care, reaching, carrying, lifting, house/yardwork, driving/computer work    Modalities:      [] GAME READY (VASO)- for significant edema, swelling, pain control. Charges:  Timed Code Treatment Minutes: 55   Total Treatment Minutes: 55     [] EVAL (LOW) 04171 (typically 20 minutes face-to-face)  [] EVAL (MOD) 73401 (typically 30 minutes face-to-face)  [] EVAL (HIGH) 58095 (typically 45 minutes face-to-face)  [] RE-EVAL   [x] XK(92516)7 x 30'    [] IONTO  [] NMR (28776) x     [] VASO  [x] Manual (04037)1x 10'      [] Other:  [x] TA (33010)0 x 15'     [] Mech Traction (84532)  [] ES(attended) (60525)      [] ES (un) (33124):       GOALS:  Patient stated goal: use dominant UE w/o deviations or pain in shoulder. []? Progressing: []? Met: []? Not Met: []? Adjusted     Therapist goals for Patient:   Short Term Goals: To be achieved in: 2 weeks  1. Independent in HEP and progression per patient tolerance, in order to prevent re-injury. []? Progressing: []? Met: []? Not Met: []? Adjusted  2. Patient will have a decrease in pain to facilitate improvement in movement, function, and ADLs as indicated by Functional Deficits. []? Progressing: []? Met: []? Not Met: []? Adjusted     Long Term Goals: To be achieved in: 12 weeks  1. Disability index score of 15% or less for the DASH to assist with reaching prior level of function. []? Progressing: []? Met: []? Not Met: []? Adjusted  2. Patient will demonstrate increased AROM to WNL's to allow for proper joint functioning as indicated by patients Functional Deficits. []? Progressing: []? Met: []? Not Met: []? Adjusted  3. Patient will demonstrate an increase in Strength to 5/5 to allow for proper functional mobility as indicated by patients Functional Deficits. []? Progressing: []? Met: []? Not Met: []? Adjusted  4. Patient will return to all functional activities without increased symptoms or restriction. []? Progressing: []? Met: []? Not Met: []? Adjusted  5. Pt will be able to return to playing basketball without problems. (patient specific functional goal)    []? Progressing: []? Met: []? Not Met: []? Adjusted          Overall Progression Towards Functional goals/ Treatment Progress Update:  [] Patient is progressing as expected towards functional goals listed. [] Progression is slowed due to complexities/Impairments listed. [] Progression has been slowed due to co-morbidities. [x] Plan just implemented, too soon to assess goals progression <30days   [] Goals require adjustment due to lack of progress  [] Patient is not progressing as expected and requires additional follow up with physician  [] Other    ASSESSMENT: AROM of dominant UE significantly improved. Function is drastically better per pt's account and visual demonstration. Pt tolerated advanced resistance program today w/ fatigue but pain free. Progressing quickly toward goals. Treatment/Activity Tolerance:  [x] Patient tolerated treatment well [] Patient limited by fatique  [] Patient limited by pain  [] Patient limited by other medical complications  [] Other:     Prognosis: [x] Good [] Fair  [] Poor    Patient Requires Follow-up: [x] Yes  [] No    PLAN: See eval  [x] Continue per plan of care [] Alter current plan (see comments)  [x] Plan of care initiated [] Hold pending MD visit [] Discharge    Electronically signed by: Selam Hartman PT MS, OMDELMY-C      Physical Therapist [address] [address] license #[id number]  Physical Therapist [place] license #[id number]     Note: If patient does not return for scheduled/ recommended follow up visits, this note will serve as a discharge from care along with most recent update on progress.

## 2022-03-23 ENCOUNTER — TREATMENT (OUTPATIENT)
Dept: PHYSICAL THERAPY | Age: 52
End: 2022-03-23
Payer: COMMERCIAL

## 2022-03-23 DIAGNOSIS — G89.29 CHRONIC RIGHT SHOULDER PAIN: Primary | ICD-10-CM

## 2022-03-23 DIAGNOSIS — M25.511 CHRONIC RIGHT SHOULDER PAIN: Primary | ICD-10-CM

## 2022-03-23 PROCEDURE — 97530 THERAPEUTIC ACTIVITIES: CPT | Performed by: PHYSICAL THERAPIST

## 2022-03-23 PROCEDURE — 97110 THERAPEUTIC EXERCISES: CPT | Performed by: PHYSICAL THERAPIST

## 2022-03-23 PROCEDURE — 97140 MANUAL THERAPY 1/> REGIONS: CPT | Performed by: PHYSICAL THERAPIST

## 2022-03-23 NOTE — PROGRESS NOTES
[facility]   Phone: [phone]    Fax: [phone]      Physical Therapy Treatment Note/ Progress Report:         Date:  3/23/2022    Patient Name:  Dianna Chowdhury    :  1970  MRN: [id number]  Restrictions/Precautions:    Medical/Treatment Diagnosis Information:  · Diagnosis: M25.511 R shoulder pain chronicity  ·  Treating Diagnosis: limited ROM, pain in shoulder, dec strength in shoulder, dyskinesia scapula  Insurance/Certification information:  PT Insurance Information: [employer]  Physician Information:  Referring Practitioner: Dr. Sharon Landon  Has the plan of care been signed (Y/N):        []  Yes  [x]  No     Date of Patient follow up with Physician: 22      Is this a Progress Report:     []  Yes  [x]  No        If Yes:  Date Range for reporting period:  Beginning 3/14/22  Ending 22    Progress report will be due (10 Rx or 30 days whichever is less): 02      Recertification will be due (POC Duration  / 90 days whichever is less): 22        Visit # Insurance Allowable Auth Required   4 25 []  Yes []  No        Functional Scale: UEFI     Date assessed:  3/14/2022      Latex Allergy:  [x]NO      []YES  Preferred Language for Healthcare:   [x]English       []other:    Pain level:  4-10/10     SUBJECTIVE:  Pt was sore after Monday's session but more in L shoulder than R. Soreness in L from along [address] jt and into arm. Relevant Medical History: HBP and cardiac condition of stent ; Covid 2021; hx of back fracture  Functional Disability Index:      Pain Scale: 4-10/10  Easing factors: activity modification;   Provocative factors: shooting basketball;      Type: [x]? Constant       []? Intermittent  [x]? Radiating     []? Localized     []? 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; walking, lifting wts, Retraction          Weight shift     Flexion     Abduction     External Rotation     Internal Rotation     Biceps     Triceps          PRE's     Flexion 2# 2x10    Abduction 2# 2x10    External Rotation     Internal Rotation     Shrugs     EXT     Reverse Flys     Serratus     Horizontal Abd with ER     Biceps     Triceps     Retraction Prone x15 5\" hold         Cable Column/Theraband     External Rotation  Cuing on tech    Side lying 2x10    Internal Rotation     Shrugs     Lats     Ext     Flex     Scapular Retraction- horizontal Silver TB 2x10  3/21/22 silver TB givenfor home    Consider prone NPV    Scapular retraction Silver TB 3x10    BIC     TRIC     PNF          Neuromuscular Re-ed     Dynamic Stability     Ball vs wall x30 up/down Mod fatigue noted                       Ed given on diagnosis, expectations & goals X10'; 3/21/22 reviewed and updated                   Manual/Modalities                       Therapeutic Exercise and NMR EXR  [x] (12007) Provided verbal/tactile cueing for activities related to strengthening, flexibility, endurance, ROM  for improvements in scapular, scapulothoracic and UE control with self care, reaching, carrying, lifting, house/yardwork, driving/computer work.    [] (07192) Provided verbal/tactile cueing for activities related to improving balance, coordination, kinesthetic sense, posture, motor skill, proprioception  to assist with  scapular, scapulothoracic and UE control with self care, reaching, carrying, lifting, house/yardwork, driving/computer work. Therapeutic Activities:    [] (56609 or 01410) Provided verbal/tactile cueing for activities related to improving balance, coordination, kinesthetic sense, posture, motor skill, proprioception and motor activation to allow for proper function of scapular, scapulothoracic and UE control with self care, carrying, lifting, driving/computer work.      Home Exercise Program:    [x] (80578) Reviewed/Progressed HEP activities related to strengthening, flexibility, endurance, ROM of scapular, scapulothoracic and UE control with self care, reaching, carrying, lifting, house/yardwork, driving/computer work     Written HEP est    Access Code: [email or web address]  URL: [email or web address]  Date: 03/14/2022  Prepared by: Yanni Membreno    Exercises  Supine Shoulder Flexion Extension AAROM with Dowel - 2 x daily - 7 x weekly - 1 sets - 10 reps - 10 hold  Supine Cross Body Shoulder Stretch - 2 x daily - 7 x weekly - 1 sets - 10 reps - 10 hold  Supine Shoulder External Rotation with Dowel - 2 x daily - 7 x weekly - 1 sets - 10 reps - 10 hold  Supine Shoulder Horizontal Abduction with Resistance - 2 x daily - 7 x weekly - 1 sets - 10 reps  Shoulder External Rotation and Scapular Retraction with Resistance - 2 x daily - 7 x weekly - 1 sets - 10 reps    3/23/22 Updated written HEP    [] (17374) Reviewed/Progressed HEP activities related to improving balance, coordination, kinesthetic sense, posture, motor skill, proprioception of scapular, scapulothoracic and UE control with self care, reaching, carrying, lifting, house/yardwork, driving    /computer work      Manual Treatments:  PROM / STM / Oscillations-Mobs:  G-I, II, III, IV (PA's, Inf., Post.)  [] ([id number]) Provided manual therapy to mobilize soft tissue/joints of cervical/CT, scapular GHJ and UE for the purpose of modulating pain, promoting relaxation,  increasing ROM, reducing/eliminating soft tissue swelling/inflammation/restriction, improving soft tissue extensibility and allowing for proper ROM for normal function with self care, reaching, carrying, lifting, house/yardwork, driving/computer work    Modalities:      [] GAME READY (VASO)- for significant edema, swelling, pain control.        Charges:  Timed Code Treatment Minutes: 55   Total Treatment Minutes: 55     [] EVAL (LOW) 34467 (typically 20 minutes face-to-face)  [] EVAL (MOD) 59106 (typically 30 minutes face-to-face)  [] EVAL (HIGH) 42720 (typically 45 minutes face-to-face)  [] RE-EVAL   [x] FZ(35179)0 x 30'    [] IONTO  [] NMR (27752) x     [] VASO  [x] Manual (50275)1x 10'      [] Other:  [x] TA (50757)3 x 15'     [] Mech Traction (26019)  [] ES(attended) (73582)      [] ES (un) (73502):       GOALS:  Patient stated goal: use dominant UE w/o deviations or pain in shoulder. []? Progressing: []? Met: []? Not Met: []? Adjusted     Therapist goals for Patient:   Short Term Goals: To be achieved in: 2 weeks  1. Independent in HEP and progression per patient tolerance, in order to prevent re-injury. []? Progressing: []? Met: []? Not Met: []? Adjusted  2. Patient will have a decrease in pain to facilitate improvement in movement, function, and ADLs as indicated by Functional Deficits. []? Progressing: []? Met: []? Not Met: []? Adjusted     Long Term Goals: To be achieved in: 12 weeks  1. Disability index score of 15% or less for the DASH to assist with reaching prior level of function. []? Progressing: []? Met: []? Not Met: []? Adjusted  2. Patient will demonstrate increased AROM to WNL's to allow for proper joint functioning as indicated by patients Functional Deficits. []? Progressing: []? Met: []? Not Met: []? Adjusted  3. Patient will demonstrate an increase in Strength to 5/5 to allow for proper functional mobility as indicated by patients Functional Deficits. []? Progressing: []? Met: []? Not Met: []? Adjusted  4. Patient will return to all functional activities without increased symptoms or restriction. []? Progressing: []? Met: []? Not Met: []? Adjusted  5. Pt will be able to return to playing basketball without problems. (patient specific functional goal)    []? Progressing: []? Met: []? Not Met: []? Adjusted          Overall Progression Towards Functional goals/ Treatment Progress Update:  [] Patient is progressing as expected towards functional goals listed.     [] Progression is slowed due to complexities/Impairments listed. [] Progression has been slowed due to co-morbidities. [x] Plan just implemented, too soon to assess goals progression <30days   [] Goals require adjustment due to lack of progress  [] Patient is not progressing as expected and requires additional follow up with physician  [] Other    ASSESSMENT: min tightness noted in pec muscles on R that limited end range flex. Pt needs cuing for scapular retraction and muscles fatigue quickly from exercises. Changed ER strengthening to side lying to avoid L shoulder program and isolate muscle more. Pt became extremely fatigued from exercises. Discussion was had about posture and its influence on shoulder symptoms w/ pt showing understanding of its importance. Treatment/Activity Tolerance:  [x] Patient tolerated treatment well [] Patient limited by fatique  [] Patient limited by pain  [] Patient limited by other medical complications  [] Other:     Prognosis: [x] Good [] Fair  [] Poor    Patient Requires Follow-up: [x] Yes  [] No    PLAN: See eval  [x] Continue per plan of care [] Alter current plan (see comments)  [x] Plan of care initiated [] Hold pending MD visit [] Discharge    Electronically signed by: Tara Hickey PT MS, OMT-C      Physical Therapist [place] license #[id number]  Physical Therapist [address] [place] [address] license #[id number]     Note: If patient does not return for scheduled/ recommended follow up visits, this note will serve as a discharge from care along with most recent update on progress.

## 2022-03-28 ENCOUNTER — TREATMENT (OUTPATIENT)
Dept: PHYSICAL THERAPY | Age: 52
End: 2022-03-28
Payer: COMMERCIAL

## 2022-03-28 DIAGNOSIS — G89.29 CHRONIC RIGHT SHOULDER PAIN: Primary | ICD-10-CM

## 2022-03-28 DIAGNOSIS — M25.511 CHRONIC RIGHT SHOULDER PAIN: Primary | ICD-10-CM

## 2022-03-28 PROCEDURE — 97530 THERAPEUTIC ACTIVITIES: CPT | Performed by: PHYSICAL THERAPIST

## 2022-03-28 PROCEDURE — 97140 MANUAL THERAPY 1/> REGIONS: CPT | Performed by: PHYSICAL THERAPIST

## 2022-03-28 PROCEDURE — 97110 THERAPEUTIC EXERCISES: CPT | Performed by: PHYSICAL THERAPIST

## 2022-03-28 NOTE — PROGRESS NOTES
[facility]   Phone: [phone]    Fax: [phone]      Physical Therapy Treatment Note/ Progress Report:         Date:  3/28/2022    Patient Name:  Brittany Rebolledo    :  1970  MRN: [id number]  Restrictions/Precautions:    Medical/Treatment Diagnosis Information:  · Diagnosis: M25.511 R shoulder pain chronicity  ·  Treating Diagnosis: limited ROM, pain in shoulder, dec strength in shoulder, dyskinesia scapula  Insurance/Certification information:  PT Insurance Information: Humana  Physician Information:  Referring Practitioner: Dr. Miladis Grimes  Has the plan of care been signed (Y/N):        []  Yes  [x]  No     Date of Patient follow up with Physician: 22      Is this a Progress Report:     []  Yes  [x]  No        If Yes:  Date Range for reporting period:  Beginning 3/14/22  Ending 22    Progress report will be due (10 Rx or 30 days whichever is less): 48      Recertification will be due (POC Duration  / 90 days whichever is less): 22        Visit # Insurance Allowable Auth Required   5 25 []  Yes []  No        Functional Scale: UEFI     Date assessed:  3/14/2022      Latex Allergy:  [x]NO      []YES  Preferred Language for Healthcare:   [x]English       []other:    Pain level:  4-10/10     SUBJECTIVE:  Pt was really sore after last session in upper back and shoulder muscles. Pt feels that he is moving so much better in his R shoulder. L shoulder has some pain as well but no problems w/ HEP that involves both shoulders. Relevant Medical History: HBP and cardiac condition of stent ; Covid 2021; hx of back fracture  Functional Disability Index:      Pain Scale: 4-10/10  Easing factors: activity modification;   Provocative factors: shooting basketball;      Type: [x]? Constant       []? Intermittent  [x]? Radiating     []? Localized     []? 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     Abduction 180 175 150 deg 75!     ER 90 90 T1 T1!     IR 20 30! T7 T12!     Other             Other                    Strength L R Comment   Flexion 5 2+! 3/14/2022   Abduction 5 2+!     ER 5 4     IR 5 4     Supraspinatus         Upper Trap         Lower Trap         Mid Trap         Rhomboids         Biceps 5 4+     Triceps 5 4+     Horizontal Abduction         Horizontal Adduction         Lats               Special Tests Results/Comment   Estefania Byrd positive   Speeds neg   OBriens     Other     Neurologic Signs     Functional Reach        Reflexes/Sensation:               [x]? Dermatomes/Myotomes intact               [x]? Reflexes equal and normal bilaterally              []?Other:     Joint mobility:               []?Normal                      [x]? Hypo end range for R shoulder;              []?Hyper     Palpation: tightness noted in pec, scapular muscles, infraspinatus, supraspinatus, levator and UT R>L;     Functional Mobility/Transfers: indep in transfers from all positions on eval;     Posture: kyphotic posture w/ rounded shoulders and FHP in upright position; ectomorph body structure;       Test measurements:      RESTRICTIONS/PRECAUTIONS: HBP and cardiac concerns w/ stent implanted 2019    Exercises/Interventions:   Exercises:  Exercise/Equipment Resistance/Repetitions Comments   Cardio/Warm-up     Bike          Stretching/PROM     Wand ER @90 10\"x10  Supine AAROM flex 10\"x10      Counter stretch in standing     Table Slides     UE [place] Pulleys     CBA 10\"x10 3/16/22 Rolling over shoulder in side lying position        STRENGTH     Isometrics     Retraction          Weight shift     Flexion     Abduction     External Rotation     Internal Rotation     Biceps     Triceps          PRE's     Flexion 2# 2x10     Incline NPV    Abduction 2# 2x10    External Rotation Side lying 2x10 + wt NPV   Internal Rotation     Shrugs     EXT     Reverse Flys     Serratus 4# 2x10    Horizontal Abd with ER     Biceps     Triceps     Retraction Prone x15 5\" hold         Cable Column/Theraband     External Rotation  Cuing on tech   Internal Rotation     Shrugs     Lats     Ext     Flex     Scapular Retraction- horizontal Silver TB 3x10  3/21/22 silver TB givenfor home    Consider prone NPV    Scapular retraction Silver TB 3x10    BIC     TRIC     PNF          Neuromuscular Re-ed     Dynamic Stability     Ball vs wall x30 up/down                        Ed given on diagnosis, expectations & goals X10'; 3/21/22 reviewed and updated                   Manual/Modalities                       Therapeutic Exercise and NMR EXR  [x] (27657) Provided verbal/tactile cueing for activities related to strengthening, flexibility, endurance, ROM  for improvements in scapular, scapulothoracic and UE control with self care, reaching, carrying, lifting, house/yardwork, driving/computer work.    [] (72466) Provided verbal/tactile cueing for activities related to improving balance, coordination, kinesthetic sense, posture, motor skill, proprioception  to assist with  scapular, scapulothoracic and UE control with self care, reaching, carrying, lifting, house/yardwork, driving/computer work.     Therapeutic Activities:    [] (97792 or 43016) Provided verbal/tactile cueing for activities related to improving balance, coordination, kinesthetic sense, posture, motor skill, proprioception and motor activation to allow for proper function of scapular, scapulothoracic and UE control with self care, carrying, lifting, driving/computer work. Home Exercise Program:    [x] (48852) Reviewed/Progressed HEP activities related to strengthening, flexibility, endurance, ROM of scapular, scapulothoracic and UE control with self care, reaching, carrying, lifting, house/yardwork, driving/computer work     Written HEP est    Access Code: [email or web address]  URL: [email or web address]  Date: 03/14/2022  Prepared by: Jamal Gonzalez    Exercises  Supine Shoulder Flexion Extension AAROM with Dowel - 2 x daily - 7 x weekly - 1 sets - 10 reps - 10 hold  Supine Cross Body Shoulder Stretch - 2 x daily - 7 x weekly - 1 sets - 10 reps - 10 hold  Supine Shoulder External Rotation with Dowel - 2 x daily - 7 x weekly - 1 sets - 10 reps - 10 hold  Supine Shoulder Horizontal Abduction with Resistance - 2 x daily - 7 x weekly - 1 sets - 10 reps  Shoulder External Rotation and Scapular Retraction with Resistance - 2 x daily - 7 x weekly - 1 sets - 10 reps    3/23/22 Updated written HEP    [] (16910) Reviewed/Progressed HEP activities related to improving balance, coordination, kinesthetic sense, posture, motor skill, proprioception of scapular, scapulothoracic and UE control with self care, reaching, carrying, lifting, house/yardwork, driving    /computer work      Manual Treatments:  PROM / STM / Oscillations-Mobs:  G-I, II, III, IV (PA's, Inf., Post.)  [] ([id number]) Provided manual therapy to mobilize soft tissue/joints of cervical/CT, scapular GHJ and UE for the purpose of modulating pain, promoting relaxation,  increasing ROM, reducing/eliminating soft tissue swelling/inflammation/restriction, improving soft tissue extensibility and allowing for proper ROM for normal function with self care, reaching, carrying, lifting, house/yardwork, driving/computer work    Modalities:      [] GAME READY (VASO)- for significant edema, swelling, pain control.        Charges:  Timed Code Treatment Minutes: 55   Total Treatment Minutes: 55     [] ISHAL (LOW) 83682 (typically 20 minutes face-to-face)  [] EVAL (MOD) 73243 (typically 30 minutes face-to-face)  [] EVAL (HIGH) 21399 (typically 45 minutes face-to-face)  [] RE-EVAL   [x] BQ(83629)5 x 30'    [] IONTO  [] NMR (30162) x     [] VASO  [x] Manual (38328)1x 10'      [] Other:  [x] TA (79536)4 x 15'     [] Mech Traction (24412)  [] ES(attended) (94707)      [] ES (un) (02050):       GOALS:  Patient stated goal: use dominant UE w/o deviations or pain in shoulder. []? Progressing: []? Met: []? Not Met: []? Adjusted     Therapist goals for Patient:   Short Term Goals: To be achieved in: 2 weeks  1. Independent in HEP and progression per patient tolerance, in order to prevent re-injury. []? Progressing: []? Met: []? Not Met: []? Adjusted  2. Patient will have a decrease in pain to facilitate improvement in movement, function, and ADLs as indicated by Functional Deficits. []? Progressing: []? Met: []? Not Met: []? Adjusted     Long Term Goals: To be achieved in: 12 weeks  1. Disability index score of 15% or less for the DASH to assist with reaching prior level of function. []? Progressing: []? Met: []? Not Met: []? Adjusted  2. Patient will demonstrate increased AROM to WNL's to allow for proper joint functioning as indicated by patients Functional Deficits. []? Progressing: []? Met: []? Not Met: []? Adjusted  3. Patient will demonstrate an increase in Strength to 5/5 to allow for proper functional mobility as indicated by patients Functional Deficits. []? Progressing: []? Met: []? Not Met: []? Adjusted  4. Patient will return to all functional activities without increased symptoms or restriction. []? Progressing: []? Met: []? Not Met: []? Adjusted  5. Pt will be able to return to playing basketball without problems. (patient specific functional goal)    []? Progressing: []? Met: []? Not Met: []?  Adjusted          Overall Progression Towards Functional goals/ Treatment Progress Update:  [] Patient is progressing as expected towards functional goals listed. [] Progression is slowed due to complexities/Impairments listed. [] Progression has been slowed due to co-morbidities. [x] Plan just implemented, too soon to assess goals progression <30days   [] Goals require adjustment due to lack of progress  [] Patient is not progressing as expected and requires additional follow up with physician  [] Other    ASSESSMENT: tightness in pec muscles but min tenderness noted. Strength and scapular control progressing. Reviewed importance of posture. Progressing w/ current program but having mod fatigue so did not advance program.     Treatment/Activity Tolerance:  [x] Patient tolerated treatment well [] Patient limited by fatique  [] Patient limited by pain  [] Patient limited by other medical complications  [] Other:     Prognosis: [x] Good [] Fair  [] Poor    Patient Requires Follow-up: [x] Yes  [] No    PLAN: See eval  [x] Continue per plan of care [] Alter current plan (see comments)  [x] Plan of care initiated [] Hold pending MD visit [] Discharge    Electronically signed by: Eduar Thibodeaux, PT, MS, OMT-C      Physical Therapist [place] license #[id number]  Physical Therapist [place] license #[id number]     Note: If patient does not return for scheduled/ recommended follow up visits, this note will serve as a discharge from care along with most recent update on progress.

## 2022-03-30 ENCOUNTER — TREATMENT (OUTPATIENT)
Dept: PHYSICAL THERAPY | Age: 52
End: 2022-03-30
Payer: COMMERCIAL

## 2022-03-30 DIAGNOSIS — G89.29 CHRONIC RIGHT SHOULDER PAIN: Primary | ICD-10-CM

## 2022-03-30 DIAGNOSIS — M25.511 CHRONIC RIGHT SHOULDER PAIN: Primary | ICD-10-CM

## 2022-03-30 PROCEDURE — 97530 THERAPEUTIC ACTIVITIES: CPT | Performed by: PHYSICAL THERAPIST

## 2022-03-30 PROCEDURE — 97110 THERAPEUTIC EXERCISES: CPT | Performed by: PHYSICAL THERAPIST

## 2022-03-30 PROCEDURE — 97140 MANUAL THERAPY 1/> REGIONS: CPT | Performed by: PHYSICAL THERAPIST

## 2022-03-30 NOTE — PROGRESS NOTES
Fady Lozano [facility]   Phone: [phone]    Fax: [phone]      Physical Therapy Treatment Note/ Progress Report:         Date:  3/30/2022    Patient Name:  Sagrario Hodge    :  1970  MRN: [id number]  Restrictions/Precautions:    Medical/Treatment Diagnosis Information:  · Diagnosis: M25.511 R shoulder pain chronicity  ·  Treating Diagnosis: limited ROM, pain in shoulder, dec strength in shoulder, dyskinesia scapula  Insurance/Certification information:  PT Insurance Information: Humana  Physician Information:  Referring Practitioner: Dr. Jose Juan Cuevas  Has the plan of care been signed (Y/N):        []  Yes  [x]  No     Date of Patient follow up with Physician: 22      Is this a Progress Report:     []  Yes  [x]  No        If Yes:  Date Range for reporting period:  Beginning 3/14/22  Ending 22    Progress report will be due (10 Rx or 30 days whichever is less):       Recertification will be due (POC Duration  / 90 days whichever is less): 22        Visit # Insurance Allowable Auth Required   5 25 []  Yes []  No        Functional Scale: UEFI     Date assessed:  3/14/2022      Latex Allergy:  [x]NO      []YES  Preferred Language for Healthcare:   [x]English       []other:    Pain level:  4-10/10     SUBJECTIVE:  Pt states that R shoulder is feeling good w/ min pain but still has modification activities. L shoulder is feeling better but having more pain along scapula that is constant. Compliant w/ HEP. Relevant Medical History: HBP and cardiac condition of stent ; Covid 2021; hx of back fracture  Functional Disability Index:      Pain Scale: 4-10/10  Easing factors: activity modification;   Provocative factors: shooting basketball;      Type: [x]? Constant       []? Intermittent  [x]? Radiating     []? Localized     []? 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     Abduction 180 175 150 deg 75!     ER 90 90 T1 T1!     IR 20 30! T7 T12!     Other             Other                    Strength L R Comment   Flexion 5 2+! 3/14/2022   Abduction 5 2+!     ER 5 4     IR 5 4     Supraspinatus         Upper Trap         Lower Trap         Mid Trap         Rhomboids         Biceps 5 4+     Triceps 5 4+     Horizontal Abduction         Horizontal Adduction         Lats               Special Tests Results/Comment   Estefania Byrd positive   Speeds neg   OBriens     Other     Neurologic Signs     Functional Reach        Reflexes/Sensation:               [x]? Dermatomes/Myotomes intact               [x]? Reflexes equal and normal bilaterally              []?Other:     Joint mobility:               []?Normal                      [x]? Hypo end range for R shoulder;              []?Hyper     Palpation: tightness noted in pec, scapular muscles, infraspinatus, supraspinatus, levator and UT R>L;     Functional Mobility/Transfers: indep in transfers from all positions on eval;     Posture: kyphotic posture w/ rounded shoulders and FHP in upright position; ectomorph body structure;       Test measurements:      RESTRICTIONS/PRECAUTIONS: HBP and cardiac concerns w/ stent implanted 2019    Exercises/Interventions:   Exercises:  Exercise/Equipment Resistance/Repetitions Comments   Cardio/Warm-up     Bike          Stretching/PROM     Wand ER @90 10\"x10  Supine AAROM flex 10\"x10      Counter stretch in standing     Table Slides     UE [place]     Pulleys     CBA 10\"x10 3/16/22 Rolling over shoulder in side lying position        STRENGTH     Isometrics     Retraction          Weight shift     Flexion     Abduction     External Rotation     Internal Rotation     Biceps     Triceps          PRE's     Flexion 2# 3x10 Inc wt NPV    2# incline x20    Abduction 2# 3x10    External Rotation Side lying 1# 2x10    Internal Rotation     Shrugs     EXT     Reverse Flys     Serratus      Table push ups 3x10    Horizontal Abd with ER     Biceps     Triceps     Retraction Prone x15 5\" hold + wt NPV        Cable Column/Theraband     External Rotation  Consider this exercise NPV vs wall   Internal Rotation     Shrugs     Lats     Ext     Flex     Scapular Retraction- horizontal Silver TB 3x10- upright   3/21/22 silver TB givenfor home    Consider prone NPV    Scapular retraction Silver TB 3x10    BIC     TRIC     PNF          Neuromuscular Re-ed     Dynamic Stability     Ball vs wall x30 up/down                        Ed given on diagnosis, expectations & goals X10'; 3/30/22 updated                   Manual/Modalities                       Therapeutic Exercise and NMR EXR  [x] (53636) Provided verbal/tactile cueing for activities related to strengthening, flexibility, endurance, ROM  for improvements in scapular, scapulothoracic and UE control with self care, reaching, carrying, lifting, house/yardwork, driving/computer work.    [] (77340) Provided verbal/tactile cueing for activities related to improving balance, coordination, kinesthetic sense, posture, motor skill, proprioception  to assist with  scapular, scapulothoracic and UE control with self care, reaching, carrying, lifting, house/yardwork, driving/computer work.     Therapeutic Activities:    [] (60788 or 48989) Provided verbal/tactile cueing for activities related to improving balance, coordination, kinesthetic sense, posture, motor skill, proprioception and motor activation to allow for proper function of scapular, scapulothoracic and UE control with self care, carrying, lifting, driving/computer work. Home Exercise Program:    [x] (36513) Reviewed/Progressed HEP activities related to strengthening, flexibility, endurance, ROM of scapular, scapulothoracic and UE control with self care, reaching, carrying, lifting, house/yardwork, driving/computer work     Written HEP     Access Code: [email or web address]  URL: [email or web address]  Date: 03/30/2022  Prepared by: Joanna Bean    Exercises  Supine Shoulder Flexion Extension AAROM with Dowel - 2 x daily - 7 x weekly - 1 sets - 10 reps - 10 hold  Supine Cross Body Shoulder Stretch - 2 x daily - 7 x weekly - 1 sets - 10 reps - 10 hold  Supine Shoulder External Rotation with Dowel - 2 x daily - 7 x weekly - 1 sets - 10 reps - 10 hold  Sidelying Shoulder Abduction Palm Forward - 1 x daily - 7 x weekly - 3 sets - 10 reps  Sidelying Shoulder ER with Towel and Dumbbell - 1 x daily - 7 x weekly - 3 sets - 10 reps  Standing Shoulder Row with Anchored Resistance - 1 x daily - 7 x weekly - 3 sets - 10 reps  Standing Shoulder Horizontal Abduction with Resistance - 1 x daily - 7 x weekly - 3 sets - 10 reps  Push Up on Table - 1 x daily - 7 x weekly - 3 sets - 10 reps  Prone Scapular Retraction - 1 x daily - 7 x weekly - 2 sets - 10 reps    [] (97498) Reviewed/Progressed HEP activities related to improving balance, coordination, kinesthetic sense, posture, motor skill, proprioception of scapular, scapulothoracic and UE control with self care, reaching, carrying, lifting, house/yardwork, driving and computer work      Manual Treatments:  PROM / STM / Oscillations-Mobs:  G-I, II, III, IV (PA's, Inf., Post.)  [] ([id number]) Provided manual therapy to mobilize soft tissue/joints of cervical/CT, scapular GHJ and UE for the purpose of modulating pain, promoting relaxation,  increasing ROM, reducing/eliminating soft tissue swelling/inflammation/restriction, improving soft tissue extensibility and allowing for proper ROM for normal function with self care, reaching, carrying, lifting, house/yardwork, driving/computer work    Modalities: CP x10 min  To both shoulders   [] GAME READY (VASO)- for significant edema, swelling, pain control. Charges:  Timed Code Treatment Minutes: 55   Total Treatment Minutes: 65     [] EVAL (LOW) 58797 (typically 20 minutes face-to-face)  [] EVAL (MOD) 53250 (typically 30 minutes face-to-face)  [] EVAL (HIGH) 75526 (typically 45 minutes face-to-face)  [] RE-EVAL   [x] KI(09037)3 x 30'    [] IONTO  [] NMR (53278) x     [] VASO  [x] Manual (11241)1x 10'      [] Other:  [x] TA (23390)1 x 15'     [] Mech Traction (83519)  [] ES(attended) (44309)      [] ES (un) (38505):       GOALS:  Patient stated goal: use dominant UE w/o deviations or pain in shoulder. []? Progressing: []? Met: []? Not Met: []? Adjusted     Therapist goals for Patient:   Short Term Goals: To be achieved in: 2 weeks  1. Independent in HEP and progression per patient tolerance, in order to prevent re-injury. []? Progressing: []? Met: []? Not Met: []? Adjusted  2. Patient will have a decrease in pain to facilitate improvement in movement, function, and ADLs as indicated by Functional Deficits. []? Progressing: []? Met: []? Not Met: []? Adjusted     Long Term Goals: To be achieved in: 12 weeks  1. Disability index score of 15% or less for the DASH to assist with reaching prior level of function. []? Progressing: []? Met: []? Not Met: []? Adjusted  2. Patient will demonstrate increased AROM to WNL's to allow for proper joint functioning as indicated by patients Functional Deficits. []? Progressing: []? Met: []? Not Met: []? Adjusted  3. Patient will demonstrate an increase in Strength to 5/5 to allow for proper functional mobility as indicated by patients Functional Deficits. []? Progressing: []? Met: []? Not Met: []? Adjusted  4.  Patient will return to all functional activities without increased symptoms or restriction. []? Progressing: []? Met: []? Not Met: []? Adjusted  5. Pt will be able to return to playing basketball without problems. (patient specific functional goal)    []? Progressing: []? Met: []? Not Met: []? Adjusted          Overall Progression Towards Functional goals/ Treatment Progress Update:  [] Patient is progressing as expected towards functional goals listed. [] Progression is slowed due to complexities/Impairments listed. [] Progression has been slowed due to co-morbidities. [x] Plan just implemented, too soon to assess goals progression <30days   [] Goals require adjustment due to lack of progress  [] Patient is not progressing as expected and requires additional follow up with physician  [] Other    ASSESSMENT: Min tenderness around R shoulder region. Muscle activation and awareness of shoulder progressing. Pt had mod fatigue from current program but no adverse effects on R shoulder. Monitoring of L shoulder did occur to avoid aggravation of symptoms. Progressing well w/ current program to gain stability and control in shoulder. Treatment/Activity Tolerance:  [x] Patient tolerated treatment well [] Patient limited by fatique  [] Patient limited by pain  [] Patient limited by other medical complications  [] Other:     Prognosis: [x] Good [] Fair  [] Poor    Patient Requires Follow-up: [x] Yes  [] No    PLAN: See eval  [x] Continue per plan of care [] Alter current plan (see comments)  [x] Plan of care initiated [] Hold pending MD visit [] Discharge    Electronically signed by: Arlet Maurer, PT, MS, OMT-C      Physical Therapist [place] license #[id number]  Physical Therapist [place] license #[id number]     Note: If patient does not return for scheduled/ recommended follow up visits, this note will serve as a discharge from care along with most recent update on progress.

## 2022-04-04 ENCOUNTER — TREATMENT (OUTPATIENT)
Dept: PHYSICAL THERAPY | Age: 52
End: 2022-04-04
Payer: COMMERCIAL

## 2022-04-04 DIAGNOSIS — M25.511 CHRONIC RIGHT SHOULDER PAIN: Primary | ICD-10-CM

## 2022-04-04 DIAGNOSIS — G89.29 CHRONIC RIGHT SHOULDER PAIN: Primary | ICD-10-CM

## 2022-04-04 PROCEDURE — 97530 THERAPEUTIC ACTIVITIES: CPT | Performed by: PHYSICAL THERAPIST

## 2022-04-04 PROCEDURE — 97140 MANUAL THERAPY 1/> REGIONS: CPT | Performed by: PHYSICAL THERAPIST

## 2022-04-04 PROCEDURE — 97110 THERAPEUTIC EXERCISES: CPT | Performed by: PHYSICAL THERAPIST

## 2022-04-04 NOTE — PROGRESS NOTES
[facility]   Phone: [phone]    Fax: [phone]      Physical Therapy Treatment Note/ Progress Report:         Date:  2022    Patient Name:  Jarvis Calderon    :  1970  MRN: [id number]  Restrictions/Precautions:    Medical/Treatment Diagnosis Information:  · Diagnosis: M25.511 R shoulder pain chronicity  ·  Treating Diagnosis: limited ROM, pain in shoulder, dec strength in shoulder, dyskinesia scapula  Insurance/Certification information:  PT Insurance Information: Humana  Physician Information:  Referring Practitioner: Dr. Geovanna Bautista  Has the plan of care been signed (Y/N):        []  Yes  [x]  No     Date of Patient follow up with Physician: 22      Is this a Progress Report:     []  Yes  [x]  No        If Yes:  Date Range for reporting period:  Beginning 3/14/22  Ending 22    Progress report will be due (10 Rx or 30 days whichever is less): 78      Recertification will be due (POC Duration  / 90 days whichever is less): 22        Visit # Insurance Allowable Auth Required   6 25 []  Yes []  No        Functional Scale: UEFI     Date assessed:  3/14/2022      Latex Allergy:  [x]NO      []YES  Preferred Language for Healthcare:   [x]English       []other:    Pain level:  4-10/10     SUBJECTIVE:  Pt states that he feels he is back to 80% of his ADL's w/o problems in R shoulder. L shoulder is feeling better w/ less pain. Pt was able to perform yard work w/o problems to include mowing and trimming. He is putting ice on after activities. Relevant Medical History: HBP and cardiac condition of stent ; Covid 2021; hx of back fracture  Functional Disability Index: UEFI 68%      Pain Scale: 4-10/10  Easing factors: activity modification;   Provocative factors: shooting basketball;      Type: [x]? Constant       []? Intermittent  [x]? Radiating     []? Localized     []? 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     Abduction 180 175 150 deg 75!     ER 90 90 T1 T1!     IR 20 30! T7 T12!     Other             Other                    Strength L R Comment   Flexion 5 2+! 3/14/2022   Abduction 5 2+!     ER 5 4     IR 5 4     Supraspinatus         Upper Trap         Lower Trap         Mid Trap         Rhomboids         Biceps 5 4+     Triceps 5 4+     Horizontal Abduction         Horizontal Adduction         Lats               Special Tests Results/Comment   Estefania Byrd positive   Speeds neg   OBriens     Other     Neurologic Signs     Functional Reach        Reflexes/Sensation:               [x]? Dermatomes/Myotomes intact               [x]? Reflexes equal and normal bilaterally              []?Other:     Joint mobility:               []?Normal                      [x]? Hypo end range for R shoulder;              []?Hyper     Palpation: tightness noted in pec, scapular muscles, infraspinatus, supraspinatus, levator and UT R>L;     Functional Mobility/Transfers: indep in transfers from all positions on eval;     Posture: kyphotic posture w/ rounded shoulders and FHP in upright position; ectomorph body structure;       Test measurements:      RESTRICTIONS/PRECAUTIONS: HBP and cardiac concerns w/ stent implanted 2019    Exercises/Interventions:   Exercises:  Exercise/Equipment Resistance/Repetitions Comments   Cardio/Warm-up     Bike          Stretching/PROM     Wand ER @90 10\"x10  Supine AAROM flex 10\"x10      Counter stretch in standing     Table Slides     UE [place]     Pulleys     CBA 10\"x10 3/16/22 Rolling over shoulder in side lying position        STRENGTH     Isometrics     Retraction          Weight shift     Flexion     Abduction     External Rotation     Internal Rotation     Biceps     Triceps          PRE's     Flexion 5# 3x10 Inc NPV    3# incline x20    Abduction 5# 3x10    External Rotation Side lying 2# 2x10    Internal Rotation     Shrugs     EXT     Reverse Flys     Serratus      Table push ups 3x10    Horizontal Abd with ER     Biceps     Triceps     Retraction Prone x15 5\" hold 1#         Cable Column/Theraband     External Rotation Vs wall, no $ 3x10 blue    Internal Rotation     Shrugs     Lats     Ext     Flex     Scapular Retraction- horizontal Silver TB 3x10- upright   3/21/22 silver TB givenfor home    Consider prone NPV    Scapular retraction   Machine 60# 3x10    BIC     TRIC     PNF          Neuromuscular Re-ed     Dynamic Stability     Ball vs wall x30 up/down                        Ed given on diagnosis, expectations & goals X10'; 3/30/22 updated                   Manual/Modalities                       Therapeutic Exercise and NMR EXR  [x] (02927) Provided verbal/tactile cueing for activities related to strengthening, flexibility, endurance, ROM  for improvements in scapular, scapulothoracic and UE control with self care, reaching, carrying, lifting, house/yardwork, driving/computer work.    [] (37217) Provided verbal/tactile cueing for activities related to improving balance, coordination, kinesthetic sense, posture, motor skill, proprioception  to assist with  scapular, scapulothoracic and UE control with self care, reaching, carrying, lifting, house/yardwork, driving/computer work.     Therapeutic Activities:    [] (07885 or 94043) Provided verbal/tactile cueing for activities related to improving balance, coordination, kinesthetic sense, posture, motor skill, proprioception and motor activation to allow for proper function of scapular, scapulothoracic and UE control with self care, carrying, lifting, driving/computer work. Home Exercise Program:    [x] (94782) Reviewed/Progressed HEP activities related to strengthening, flexibility, endurance, ROM of scapular, scapulothoracic and UE control with self care, reaching, carrying, lifting, house/yardwork, driving/computer work     Written HEP     Access Code: [email or web address]  URL: [employer]/  Date: 03/30/2022  Prepared by: Padmini Patel    Exercises  Supine Shoulder Flexion Extension AAROM with Dowel - 2 x daily - 7 x weekly - 1 sets - 10 reps - 10 hold  Supine Cross Body Shoulder Stretch - 2 x daily - 7 x weekly - 1 sets - 10 reps - 10 hold  Supine Shoulder External Rotation with Dowel - 2 x daily - 7 x weekly - 1 sets - 10 reps - 10 hold  Sidelying Shoulder Abduction Palm Forward - 1 x daily - 7 x weekly - 3 sets - 10 reps  Sidelying Shoulder ER with Towel and Dumbbell - 1 x daily - 7 x weekly - 3 sets - 10 reps  Standing Shoulder Row with Anchored Resistance - 1 x daily - 7 x weekly - 3 sets - 10 reps  Standing Shoulder Horizontal Abduction with Resistance - 1 x daily - 7 x weekly - 3 sets - 10 reps  Push Up on Table - 1 x daily - 7 x weekly - 3 sets - 10 reps  Prone Scapular Retraction - 1 x daily - 7 x weekly - 2 sets - 10 reps    [] (24665) Reviewed/Progressed HEP activities related to improving balance, coordination, kinesthetic sense, posture, motor skill, proprioception of scapular, scapulothoracic and UE control with self care, reaching, carrying, lifting, house/yardwork, driving and computer work      Manual Treatments:  PROM / STM / Oscillations-Mobs:  G-I, II, III, IV (PA's, Inf., Post.)  [] ([id number]) Provided manual therapy to mobilize soft tissue/joints of cervical/CT, scapular GHJ and UE for the purpose of modulating pain, promoting relaxation,  increasing ROM, reducing/eliminating soft tissue swelling/inflammation/restriction, improving soft tissue extensibility and allowing for proper ROM for normal function with self care, reaching, carrying, lifting, house/yardwork, driving/computer work    Modalities: CP x10 min  To both shoulders   [] GAME READY (VASO)- for significant edema, swelling, pain control. Charges:  Timed Code Treatment Minutes: 55   Total Treatment Minutes: 65     [] EVAL (LOW) 45552 (typically 20 minutes face-to-face)  [] EVAL (MOD) 40056 (typically 30 minutes face-to-face)  [] EVAL (HIGH) 00648 (typically 45 minutes face-to-face)  [] RE-EVAL   [x] QX(38571)9 x 30'    [] IONTO  [] NMR (71269) x     [] VASO  [x] Manual (03517)1x 10'      [] Other:  [x] TA (70979)5 x 15'     [] Mech Traction (83824)  [] ES(attended) (32784)      [] ES (un) (17820):       GOALS:  Patient stated goal: use dominant UE w/o deviations or pain in shoulder. []? Progressing: []? Met: []? Not Met: []? Adjusted     Therapist goals for Patient:   Short Term Goals: To be achieved in: 2 weeks  1. Independent in HEP and progression per patient tolerance, in order to prevent re-injury. []? Progressing: []? Met: []? Not Met: []? Adjusted  2. Patient will have a decrease in pain to facilitate improvement in movement, function, and ADLs as indicated by Functional Deficits. []? Progressing: []? Met: []? Not Met: []? Adjusted     Long Term Goals: To be achieved in: 12 weeks  1. Disability index score of 15% or less for the DASH to assist with reaching prior level of function. []? Progressing: []? Met: []? Not Met: []? Adjusted  2. Patient will demonstrate increased AROM to WNL's to allow for proper joint functioning as indicated by patients Functional Deficits. []? Progressing: []? Met: []? Not Met: []? Adjusted  3. Patient will demonstrate an increase in Strength to 5/5 to allow for proper functional mobility as indicated by patients Functional Deficits. []? Progressing: []? Met: []? Not Met: []? Adjusted  4.  Patient will return to all functional activities without increased symptoms or restriction. []? Progressing: []? Met: []? Not Met: []? Adjusted  5. Pt will be able to return to playing basketball without problems. (patient specific functional goal)    []? Progressing: []? Met: []? Not Met: []? Adjusted          Overall Progression Towards Functional goals/ Treatment Progress Update:  [] Patient is progressing as expected towards functional goals listed. [] Progression is slowed due to complexities/Impairments listed. [] Progression has been slowed due to co-morbidities. [x] Plan just implemented, too soon to assess goals progression <30days   [] Goals require adjustment due to lack of progress  [] Patient is not progressing as expected and requires additional follow up with physician  [] Other    ASSESSMENT: ROM in shoulder progressing to full motion w/o pain. Control in shoulder and scapular stability progressing and pt managing inc in resistance w/o adverse effects. Pt ready to initiate more dynamic activities and functional patterns as pain is controlled. Pt has adjusted his work station and more aware of posture which has assisted w/ recovery. Treatment/Activity Tolerance:  [x] Patient tolerated treatment well [] Patient limited by fatique  [] Patient limited by pain  [] Patient limited by other medical complications  [] Other:     Prognosis: [x] Good [] Fair  [] Poor    Patient Requires Follow-up: [x] Yes  [] No    PLAN: Cont therapy for stability and control of shoulder. [x] Continue per plan of care [] Alter current plan (see comments)  [x] Plan of care initiated [] Hold pending MD visit [] Discharge    Electronically signed by: Wes Portillo, PT, MS, OMT-C      Physical Therapist [place] license #[id number]  Physical Therapist [place] license #[id number]     Note: If patient does not return for scheduled/ recommended follow up visits, this note will serve as a discharge from care along with most recent update on progress.

## 2022-04-06 ENCOUNTER — TREATMENT (OUTPATIENT)
Dept: PHYSICAL THERAPY | Age: 52
End: 2022-04-06
Payer: COMMERCIAL

## 2022-04-06 DIAGNOSIS — M25.511 CHRONIC RIGHT SHOULDER PAIN: Primary | ICD-10-CM

## 2022-04-06 DIAGNOSIS — G89.29 CHRONIC RIGHT SHOULDER PAIN: Primary | ICD-10-CM

## 2022-04-06 PROCEDURE — 97140 MANUAL THERAPY 1/> REGIONS: CPT | Performed by: PHYSICAL THERAPIST

## 2022-04-06 PROCEDURE — 97110 THERAPEUTIC EXERCISES: CPT | Performed by: PHYSICAL THERAPIST

## 2022-04-06 PROCEDURE — 97530 THERAPEUTIC ACTIVITIES: CPT | Performed by: PHYSICAL THERAPIST

## 2022-04-06 NOTE — PROGRESS NOTES
Fady Lozano [facility]   Phone: [phone]    Fax: [phone]      Physical Therapy Treatment Note/ Progress Report:         Date:  2022    Patient Name:  Ludwig Santana    :  1970  MRN: [id number]  Restrictions/Precautions:    Medical/Treatment Diagnosis Information:  · Diagnosis: M25.511 R shoulder pain chronicity  ·  Treating Diagnosis: limited ROM, pain in shoulder, dec strength in shoulder, dyskinesia scapula  Insurance/Certification information:  PT Insurance Information: [employer]  Physician Information:  Referring Practitioner: Dr. Katya Hansen  Has the plan of care been signed (Y/N):        []  Yes  [x]  No     Date of Patient follow up with Physician: 22      Is this a Progress Report:     []  Yes  [x]  No        If Yes:  Date Range for reporting period:  Beginning 3/14/22  Ending 22    Progress report will be due (10 Rx or 30 days whichever is less):       Recertification will be due (POC Duration  / 90 days whichever is less): 22        Visit # Insurance Allowable Auth Required   7 25 []  Yes []  No        Functional Scale: UEFI     Date assessed:  3/14/2022      Latex Allergy:  [x]NO      []YES  Preferred Language for Healthcare:   [x]English       []other:    Pain level:  4-10/10     SUBJECTIVE:  Pt states that he was not sore after last session. He has not had to ice more than normal to control any symptoms in either shoulder. L shoulder is painful today after office work this AM.      Relevant Medical History: HBP and cardiac condition of stent ; Covid 2021; hx of back fracture  Functional Disability Index: UEFI 68%      Pain Scale: 4-10/10  Easing factors: activity modification;   Provocative factors: shooting basketball; office work; reaching;     Type: [x]? Constant       []? Intermittent  [x]? Radiating     []? Localized     []? 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     Abduction 180 175 150 deg 75!     ER 90 90 T1 T1!     IR 20 30! T7 T12!     Other             Other                    Strength L R Comment   Flexion 5 2+! 3/14/2022   Abduction 5 2+!     ER 5 4     IR 5 4     Supraspinatus         Upper Trap         Lower Trap         Mid Trap         Rhomboids         Biceps 5 4+     Triceps 5 4+     Horizontal Abduction         Horizontal Adduction         Lats               Special Tests Results/Comment   Estefania Byrd positive   Speeds neg   OBriens     Other     Neurologic Signs     Functional Reach        Reflexes/Sensation:               [x]? Dermatomes/Myotomes intact               [x]? Reflexes equal and normal bilaterally              []?Other:     Joint mobility:               []?Normal                      [x]? Hypo end range for R shoulder;              []?Hyper     Palpation: tightness noted in pec, scapular muscles, infraspinatus, supraspinatus, levator and UT R>L;     Functional Mobility/Transfers: indep in transfers from all positions on eval;     Posture: kyphotic posture w/ rounded shoulders and FHP in upright position; ectomorph body structure;       Test measurements:      RESTRICTIONS/PRECAUTIONS: HBP and cardiac concerns w/ stent implanted 2019    Exercises/Interventions:   Exercises:  Exercise/Equipment Resistance/Repetitions Comments   Cardio/Warm-up     Bike          Stretching/PROM     Wand ER @90 10\"x10  Supine AAROM flex 10\"x10      Counter stretch in standing     Table Slides     UE [place]     Pulleys CBA 10\"x10 3/16/22 Rolling over shoulder in side lying position        STRENGTH     Isometrics     Retraction          Weight shift     Flexion     Abduction     External Rotation     Internal Rotation     Biceps     Triceps          PRE's     Flexion      5# incline x20    Abduction 5# 3x10    External Rotation Side lying 2# 2x10    Internal Rotation     Shrugs     EXT     Reverse Flys     Serratus      Table push ups 3x10    Horizontal Abd with ER     Biceps     Triceps     Retraction Prone x15 5\" hold 1#         Cable Column/Theraband     External Rotation Vs wall, no $ 3x10 blue    Internal Rotation     Shrugs     Lats 60# 3x10    Ext     Flex     Scapular Retraction- horizontal   3/21/22 silver TB givenfor home    Prone 3x10    Scapular retraction Machine 60# 3x10    BIC     TRIC     PNF          Neuromuscular Re-ed     Dynamic Stability     Ball vs wall     Diagonal V pattern x30                   Ed given on diagnosis, expectations & goals X10'; 3/30/22 updated                   Manual/Modalities                       Therapeutic Exercise and NMR EXR  [x] (79815) Provided verbal/tactile cueing for activities related to strengthening, flexibility, endurance, ROM  for improvements in scapular, scapulothoracic and UE control with self care, reaching, carrying, lifting, house/yardwork, driving/computer work.    [] (20535) Provided verbal/tactile cueing for activities related to improving balance, coordination, kinesthetic sense, posture, motor skill, proprioception  to assist with  scapular, scapulothoracic and UE control with self care, reaching, carrying, lifting, house/yardwork, driving/computer work.     Therapeutic Activities:    [] (10968 or 39649) Provided verbal/tactile cueing for activities related to improving balance, coordination, kinesthetic sense, posture, motor skill, proprioception and motor activation to allow for proper function of scapular, scapulothoracic and UE control with self care, carrying, lifting, driving/computer work. Home Exercise Program:    [x] (65691) Reviewed/Progressed HEP activities related to strengthening, flexibility, endurance, ROM of scapular, scapulothoracic and UE control with self care, reaching, carrying, lifting, house/yardwork, driving/computer work     Written HEP     Access Code: [email or web address]  URL: [email or web address]  Date: 03/30/2022  Prepared by: Zaynab Cortes    Exercises  Supine Shoulder Flexion Extension AAROM with Dowel - 2 x daily - 7 x weekly - 1 sets - 10 reps - 10 hold  Supine Cross Body Shoulder Stretch - 2 x daily - 7 x weekly - 1 sets - 10 reps - 10 hold  Supine Shoulder External Rotation with Dowel - 2 x daily - 7 x weekly - 1 sets - 10 reps - 10 hold  Sidelying Shoulder Abduction Palm Forward - 1 x daily - 7 x weekly - 3 sets - 10 reps  Sidelying Shoulder ER with Towel and Dumbbell - 1 x daily - 7 x weekly - 3 sets - 10 reps  Standing Shoulder Row with Anchored Resistance - 1 x daily - 7 x weekly - 3 sets - 10 reps  Standing Shoulder Horizontal Abduction with Resistance - 1 x daily - 7 x weekly - 3 sets - 10 reps  Push Up on Table - 1 x daily - 7 x weekly - 3 sets - 10 reps  Prone Scapular Retraction - 1 x daily - 7 x weekly - 2 sets - 10 reps    [] (03341) Reviewed/Progressed HEP activities related to improving balance, coordination, kinesthetic sense, posture, motor skill, proprioception of scapular, scapulothoracic and UE control with self care, reaching, carrying, lifting, house/yardwork, driving and computer work      Manual Treatments:  PROM / STM / Oscillations-Mobs:  G-I, II, III, IV (PA's, Inf., Post.)  [] ([address] [facility]) Provided manual therapy to mobilize soft tissue/joints of cervical/CT, scapular GHJ and UE for the purpose of modulating pain, promoting relaxation,  increasing ROM, reducing/eliminating soft tissue swelling/inflammation/restriction, improving soft tissue extensibility and allowing for proper ROM for normal function with self care, reaching, carrying, lifting, house/yardwork, driving/computer work    Modalities: CP x10 min  To both shoulders   [] GAME READY (VASO)- for significant edema, swelling, pain control. Charges:  Timed Code Treatment Minutes: 55   Total Treatment Minutes: 65     [] EVAL (LOW) 55336 (typically 20 minutes face-to-face)  [] EVAL (MOD) 03102 (typically 30 minutes face-to-face)  [] EVAL (HIGH) 20316 (typically 45 minutes face-to-face)  [] RE-EVAL   [x] CF(07490)1 x 30'    [] IONTO  [] NMR (30349) x     [] VASO  [x] Manual (95492)1x 10'      [] Other:  [x] TA (93327)7 x 15'     [] Mech Traction (03116)  [] ES(attended) (60573)      [] ES (un) (97922):       GOALS:  Patient stated goal: use dominant UE w/o deviations or pain in shoulder. []? Progressing: []? Met: []? Not Met: []? Adjusted     Therapist goals for Patient:   Short Term Goals: To be achieved in: 2 weeks  1. Independent in HEP and progression per patient tolerance, in order to prevent re-injury. []? Progressing: []? Met: []? Not Met: []? Adjusted  2. Patient will have a decrease in pain to facilitate improvement in movement, function, and ADLs as indicated by Functional Deficits. []? Progressing: []? Met: []? Not Met: []? Adjusted     Long Term Goals: To be achieved in: 12 weeks  1. Disability index score of 15% or less for the DASH to assist with reaching prior level of function. []? Progressing: []? Met: []? Not Met: []? Adjusted  2. Patient will demonstrate increased AROM to WNL's to allow for proper joint functioning as indicated by patients Functional Deficits. []? Progressing: []? Met: []? Not Met: []? Adjusted  3. Patient will demonstrate an increase in Strength to 5/5 to allow for proper functional mobility as indicated by patients Functional Deficits. []? Progressing: []? Met: []? Not Met: []? Adjusted  4. Patient will return to all functional activities without increased symptoms or restriction. []? Progressing: []? Met: []?  Not Met: []? Adjusted  5. Pt will be able to return to playing basketball without problems. (patient specific functional goal)    []? Progressing: []? Met: []? Not Met: []? Adjusted          Overall Progression Towards Functional goals/ Treatment Progress Update:  [] Patient is progressing as expected towards functional goals listed. [] Progression is slowed due to complexities/Impairments listed. [] Progression has been slowed due to co-morbidities. [x] Plan just implemented, too soon to assess goals progression <30days   [] Goals require adjustment due to lack of progress  [] Patient is not progressing as expected and requires additional follow up with physician  [] Other    ASSESSMENT: ROM in both shoulders full w/o pain. Exercises performed for L shoulder as well to strengthen for more dynamic activities. Pt had to focus on isolating muscles in prone series to perform correctly w/ mod fatigue noted. Pt did not have pain or any adverse effects from program for either shoulder today. Progressing well toward goals. Treatment/Activity Tolerance:  [x] Patient tolerated treatment well [] Patient limited by fatique  [] Patient limited by pain  [] Patient limited by other medical complications  [] Other:     Prognosis: [x] Good [] Fair  [] Poor    Patient Requires Follow-up: [x] Yes  [] No    PLAN: Cont therapy for stability and control of shoulder. Pt will focus on HEP for next week w/ PT being out-of-town and then return to clinic. [x] Continue per plan of care [] Alter current plan (see comments)  [x] Plan of care initiated [] Hold pending MD visit [] Discharge    Electronically signed by: Chiara Lang, PT, MS, OMT-C      Physical Therapist [place] license #[id number]  Physical Therapist [place] license #[id number]     Note: If patient does not return for scheduled/ recommended follow up visits, this note will serve as a discharge from care along with most recent update on progress.

## 2022-04-18 ENCOUNTER — OFFICE VISIT (OUTPATIENT)
Dept: ORTHOPEDIC SURGERY | Age: 52
End: 2022-04-18
Payer: COMMERCIAL

## 2022-04-18 ENCOUNTER — TREATMENT (OUTPATIENT)
Dept: PHYSICAL THERAPY | Age: 52
End: 2022-04-18
Payer: COMMERCIAL

## 2022-04-18 VITALS — HEIGHT: 69 IN | BODY MASS INDEX: 35.84 KG/M2 | WEIGHT: 242 LBS

## 2022-04-18 DIAGNOSIS — M25.511 CHRONIC RIGHT SHOULDER PAIN: Primary | ICD-10-CM

## 2022-04-18 DIAGNOSIS — S43.432S GLENOID LABRUM TEAR, LEFT, SEQUELA: ICD-10-CM

## 2022-04-18 DIAGNOSIS — M25.512 ACUTE PAIN OF LEFT SHOULDER: Primary | ICD-10-CM

## 2022-04-18 DIAGNOSIS — M75.21 BICEPS TENDINITIS OF RIGHT SHOULDER: ICD-10-CM

## 2022-04-18 DIAGNOSIS — G89.29 CHRONIC RIGHT SHOULDER PAIN: ICD-10-CM

## 2022-04-18 DIAGNOSIS — G89.29 CHRONIC RIGHT SHOULDER PAIN: Primary | ICD-10-CM

## 2022-04-18 DIAGNOSIS — M25.511 CHRONIC RIGHT SHOULDER PAIN: ICD-10-CM

## 2022-04-18 PROCEDURE — 97530 THERAPEUTIC ACTIVITIES: CPT | Performed by: PHYSICAL THERAPIST

## 2022-04-18 PROCEDURE — 97140 MANUAL THERAPY 1/> REGIONS: CPT | Performed by: PHYSICAL THERAPIST

## 2022-04-18 PROCEDURE — 99214 OFFICE O/P EST MOD 30 MIN: CPT | Performed by: ORTHOPAEDIC SURGERY

## 2022-04-18 PROCEDURE — 97110 THERAPEUTIC EXERCISES: CPT | Performed by: PHYSICAL THERAPIST

## 2022-04-18 NOTE — PROGRESS NOTES
ADLs and IADLs, min limitations intermittently prior to Jan but now constant;     OBJECTIVE: See eval   Observation:   Neck ROM: FB WNL NE, BB 75% NE, rotation WNL's NE, SB 75% NE;             ROM PROM AROM  Comment     L R L R 4/18/2022   Flexion 180 170 160 deg! 155 (85!)     Abduction 180 175 155 deg! 160 (75!)     ER 90 90 T1 T1     IR 20 30! T9 T11!     Other             Other                    Strength L R Comment   Flexion 5 5 (2+!) 4/18/2022   Abduction 5 4+(2+!)     ER 5 4+     IR 5 4+     Supraspinatus         Upper Trap         Lower Trap         Mid Trap         Rhomboids         Biceps 5 5     Triceps 5 4+     Horizontal Abduction         Horizontal Adduction         Lats               Special Tests Results/Comment   Estefania Byrd positive   Speeds neg   OBriens     Other     Neurologic Signs     Functional Reach        Reflexes/Sensation:               [x]? Dermatomes/Myotomes intact               [x]? Reflexes equal and normal bilaterally              []?Other:     Joint mobility:               []?Normal                      [x]? Hypo end range for R shoulder;              []?Hyper     Palpation: tightness noted in pec, scapular muscles, infraspinatus, supraspinatus, levator and UT R>L;     Functional Mobility/Transfers: indep in transfers from all positions on eval;     Posture: kyphotic posture w/ rounded shoulders and FHP in upright position; ectomorph body structure;       Test measurements:      RESTRICTIONS/PRECAUTIONS: HBP and cardiac concerns w/ stent implanted 2019    Exercises/Interventions:   Exercises:  Exercise/Equipment Resistance/Repetitions Comments   Cardio/Warm-up     Bike          Stretching/PROM     Wand ER @90 10\"x10  Supine AAROM flex 10\"x10      Counter stretch in standing     Doorway stretch 10\"x10    Table Slides     UE [place]     Pulleys     CBA 10\"x10 3/16/22 Rolling over shoulder in side lying position        STRENGTH     Isometrics     Retraction      Weight shift     Flexion     Abduction     External Rotation     Internal Rotation     Biceps     Triceps          PRE's     Flexion      3# sitting 2x10 Both shoulders   Abduction 5# 3x10 Both shoulders    scaption 3# 1x10    External Rotation Side lying 3# 3x10 Both shoulders   Internal Rotation     Shrugs     EXT     Reverse Flys     Serratus 7# 3x10         Horizontal Abd with ER     Biceps     Triceps     Retraction Prone 2# 3x10         Cable Column/Theraband     External Rotation Vs wall, no $ 3x10 blue    Internal Rotation     Shrugs     Lats Machine 60# 3x10    Ext     Flex     Scapular Retraction- horizontal      Prone 3x10    Scapular retraction Machine 60# 3x10    BIC     TRIC     PNF          Neuromuscular Re-ed     Dynamic Stability     Ball vs wall     Diagonal V pattern x30                   Ed given on diagnosis, expectations & goals X10'; 3/30/22 updated                   Manual/Modalities                       Therapeutic Exercise and NMR EXR  [x] (89137) Provided verbal/tactile cueing for activities related to strengthening, flexibility, endurance, ROM  for improvements in scapular, scapulothoracic and UE control with self care, reaching, carrying, lifting, house/yardwork, driving/computer work.    [] (70041) Provided verbal/tactile cueing for activities related to improving balance, coordination, kinesthetic sense, posture, motor skill, proprioception  to assist with  scapular, scapulothoracic and UE control with self care, reaching, carrying, lifting, house/yardwork, driving/computer work. Therapeutic Activities:    [] (34088 or 94007) Provided verbal/tactile cueing for activities related to improving balance, coordination, kinesthetic sense, posture, motor skill, proprioception and motor activation to allow for proper function of scapular, scapulothoracic and UE control with self care, carrying, lifting, driving/computer work.      Home Exercise Program:    [x] (66661) Reviewed/Progressed HEP activities related to strengthening, flexibility, endurance, ROM of scapular, scapulothoracic and UE control with self care, reaching, carrying, lifting, house/yardwork, driving/computer work     Written HEP     Access Code: [email or web address]  URL: [email or web address]  Date: 03/30/2022  Prepared by: Anahi King    Exercises  Supine Shoulder Flexion Extension AAROM with Dowel - 2 x daily - 7 x weekly - 1 sets - 10 reps - 10 hold  Supine Cross Body Shoulder Stretch - 2 x daily - 7 x weekly - 1 sets - 10 reps - 10 hold  Supine Shoulder External Rotation with Dowel - 2 x daily - 7 x weekly - 1 sets - 10 reps - 10 hold  Sidelying Shoulder Abduction Palm Forward - 1 x daily - 7 x weekly - 3 sets - 10 reps  Sidelying Shoulder ER with Towel and Dumbbell - 1 x daily - 7 x weekly - 3 sets - 10 reps  Standing Shoulder Row with Anchored Resistance - 1 x daily - 7 x weekly - 3 sets - 10 reps  Standing Shoulder Horizontal Abduction with Resistance - 1 x daily - 7 x weekly - 3 sets - 10 reps  Push Up on Table - 1 x daily - 7 x weekly - 3 sets - 10 reps  Prone Scapular Retraction - 1 x daily - 7 x weekly - 2 sets - 10 reps    [] (88674) Reviewed/Progressed HEP activities related to improving balance, coordination, kinesthetic sense, posture, motor skill, proprioception of scapular, scapulothoracic and UE control with self care, reaching, carrying, lifting, house/yardwork, driving and computer work      Manual Treatments:  PROM / STM / Oscillations-Mobs:  G-I, II, III, IV (PA's, Inf., Post.)  [] ([id number]) Provided manual therapy to mobilize soft tissue/joints of cervical/CT, scapular GHJ and UE for the purpose of modulating pain, promoting relaxation,  increasing ROM, reducing/eliminating soft tissue swelling/inflammation/restriction, improving soft tissue extensibility and allowing for proper ROM for normal function with self care, reaching, carrying, lifting, house/yardwork, driving/computer work    Modalities: CP x10 min  To both shoulders   [] GAME READY (VASO)- for significant edema, swelling, pain control. Charges:  Timed Code Treatment Minutes: 55   Total Treatment Minutes: 65     [] EVAL (LOW) 44854 (typically 20 minutes face-to-face)  [] EVAL (MOD) 47418 (typically 30 minutes face-to-face)  [] EVAL (HIGH) 71044 (typically 45 minutes face-to-face)  [] RE-EVAL   [x] FJ(30233)2 x 30'    [] IONTO  [] NMR (70668) x     [] VASO  [x] Manual (65724)1x 10'      [] Other:  [x] TA (35580)8 x 15'     [] Mech Traction (11497)  [] ES(attended) (61357)      [] ES (un) (85365):       GOALS:  Patient stated goal: use dominant UE w/o deviations or pain in shoulder. []? Progressing: []? Met: []? Not Met: []? Adjusted     Therapist goals for Patient:   Short Term Goals: To be achieved in: 2 weeks  1. Independent in HEP and progression per patient tolerance, in order to prevent re-injury. []? Progressing: []? Met: []? Not Met: []? Adjusted  2. Patient will have a decrease in pain to facilitate improvement in movement, function, and ADLs as indicated by Functional Deficits. []? Progressing: []? Met: []? Not Met: []? Adjusted     Long Term Goals: To be achieved in: 12 weeks  1. Disability index score of 15% or less for the DASH to assist with reaching prior level of function. []? Progressing: []? Met: []? Not Met: []? Adjusted  2. Patient will demonstrate increased AROM to WNL's to allow for proper joint functioning as indicated by patients Functional Deficits. []? Progressing: []? Met: []? Not Met: []? Adjusted  3. Patient will demonstrate an increase in Strength to 5/5 to allow for proper functional mobility as indicated by patients Functional Deficits. []? Progressing: []? Met: []? Not Met: []? Adjusted  4. Patient will return to all functional activities without increased symptoms or restriction. []? Progressing: []? Met: []? Not Met: []? Adjusted  5.  Pt will be able to return to playing basketball without problems. (patient specific functional goal)    []? Progressing: []? Met: []? Not Met: []? Adjusted          Overall Progression Towards Functional goals/ Treatment Progress Update:  [] Patient is progressing as expected towards functional goals listed. [] Progression is slowed due to complexities/Impairments listed. [] Progression has been slowed due to co-morbidities. [x] Plan just implemented, too soon to assess goals progression <30days   [] Goals require adjustment due to lack of progress  [] Patient is not progressing as expected and requires additional follow up with physician  [] Other    ASSESSMENT:  ROM in B shoulders improved but limited in IR with some symptoms on L shoulder but not R. Pt did well w/ advanced program for strengthening of both shoulders. Pt has poor control of scapula when lowering from fully flexed position in upright position bilat. Clicking and popping noted in L shoulder but not in R. Pt had visible weakness in L >R shoulder w/ exercise program today but avoided any pain. UEFI showed significant improvements from initial eval. L shoulder symptoms more noted in recent sessions with none in R. Treatment/Activity Tolerance:  [x] Patient tolerated treatment well [] Patient limited by fatique  [] Patient limited by pain  [] Patient limited by other medical complications  [] Other:     Prognosis: [x] Good [] Fair  [] Poor    Patient Requires Follow-up: [x] Yes  [] No    PLAN: Cont therapy for stability and control of shoulder. Recommend that pt continue therapy to improve strength and stability in shoulder.    [x] Continue per plan of care [] Alter current plan (see comments)  [x] Plan of care initiated [] Hold pending MD visit [] Discharge    Electronically signed by: Florina Montero PT, MS, OMT-C      Physical Therapist [id number] [address] license #[id number]  Physical Therapist [facility] license #[id number]     Note: If patient does not return for scheduled/ recommended follow up visits, this note will serve as a discharge from care along with most recent update on progress.

## 2022-04-18 NOTE — PROGRESS NOTES
posterior and posteroinferior glenoid labrum with possible subtle extension into anteroinferior    glenoid labrum/circumferential extension with an associated 1.4cm paralabral cyst adjacent to    the inferior glenoid labrum at the 6 to 7-o'clock position (coronal T2 fat-sat series 9, image    9 and sagittal T2 fat-sat series 7, image 8) and a 1.4cm paralabral cyst within the    spinoglenoid notch (axial PD fat-sat series 6, image 12, sagittal T2 fat-sat series 7 image 5    and coronal T2 fat-sat series 9 image 7 and 6).  There is no evidence of muscular denervation    of the infraspinatus muscle to suggest associated suprascapular nerve entrapment at this time.       There is mild to moderate-severity diffuse fatty infiltration of the teres minor muscle    (sagittal T1 series 8, images 3 through 11) concerning for a sequelae of quadrilateral space    syndrome (with no evidence of soft tissue mass within the quadrilateral space).       There is mild tendinosis and peritendinitis of the supraspinatus tendon which is intact without    tear.       A tiny 2-3mm low-grade (less than 1/3 thickness) partial-thickness interstitial tear of the    subscapularis tendon is present (sagittal T2 fat-sat series 7 image 13).        The infraspinatus and teres minor tendons are intact with no evidence of tendinosis or tendon    tear.  The long head of the biceps tendon is intact.       Mild lateral outlet stenosis is present secondary to inferolateral acromial tilt which could    predispose to rotator cuff impingement.       Mild acromioclavicular osteoarthritis is present with mild acromioclavicular spurring and a    small subchondral cyst within the acromion adjacent to the acromioclavicular joint with no    substantial associated medial outlet stenosis.       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       CONCLUSION:   1. A large nondisplaced glenoid labral tear is present involving the superior, posterior and    posteroinferior glenoid labrum (with possible subtle extension into the anteroinferior glenoid    labrum/circumferential extension).  An associated 1.4cm paralabral cyst is present adjacent to    the inferior glenoid labrum at the 6 to 7-o'clock position, and a 1.4cm paralabral cyst is also    present within the spinoglenoid notch (with no evidence of associated muscular denervation of    the infraspinatus muscle to suggest associated suprascapular nerve entrapment at this time). 2. Mild to moderate-severity diffuse fatty infiltration of the teres minor muscle concerning    for quadrilateral space syndrome (with no evidence of soft tissue mass within the quadrilateral    space). 3. Tiny low-grade (less than 1/3 thickness) partial-thickness interstitial tear of the distal    subscapularis tendon. 4. Mild tendinosis and peritendinitis of the supraspinatus tendon without supraspinatus tendon    tear. 5. Mild lateral outlet stenosis secondary to mild inferolateral acromial tilt which could    predispose to rotator cuff impingement. 6. Mild acromioclavicular osteoarthritis. 7. Os acromiale.

## 2022-04-20 ENCOUNTER — TREATMENT (OUTPATIENT)
Dept: PHYSICAL THERAPY | Age: 52
End: 2022-04-20
Payer: COMMERCIAL

## 2022-04-20 DIAGNOSIS — M25.511 CHRONIC RIGHT SHOULDER PAIN: Primary | ICD-10-CM

## 2022-04-20 DIAGNOSIS — G89.29 CHRONIC RIGHT SHOULDER PAIN: Primary | ICD-10-CM

## 2022-04-20 PROCEDURE — 97140 MANUAL THERAPY 1/> REGIONS: CPT | Performed by: PHYSICAL THERAPIST

## 2022-04-20 PROCEDURE — 97110 THERAPEUTIC EXERCISES: CPT | Performed by: PHYSICAL THERAPIST

## 2022-04-20 PROCEDURE — 97530 THERAPEUTIC ACTIVITIES: CPT | Performed by: PHYSICAL THERAPIST

## 2022-04-20 NOTE — PROGRESS NOTES
[facility]   Phone: [phone]    Fax: [phone]      Physical Therapy Treatment Note/ Progress Report:           Date:  2022    Patient Name:  Reilly Renteria    :  1970  MRN: [id number]  Restrictions/Precautions:    Medical/Treatment Diagnosis Information:  · Diagnosis: M25.511 R shoulder pain chronicity  ·  Treating Diagnosis: limited ROM, pain in shoulder, dec strength in shoulder, dyskinesia scapula  Insurance/Certification information:  PT Insurance Information: [employer]  Physician Information:  Referring Practitioner: Dr. Rosa Garrett  Has the plan of care been signed (Y/N):        []  Yes  [x]  No     Date of Patient follow up with Physician:       Is this a Progress Report:     [x]  Yes  []  No        If Yes:  Date Range for reporting period:  Beginning 22  Ending 22    Progress report will be due (10 Rx or 30 days whichever is less):       Recertification will be due (POC Duration  / 90 days whichever is less): 22        Visit # Insurance Allowable Auth Required   9 25 []  Yes []  No        Functional Scale: UEFI 68%    Date assessed:  3/14/2022  4/18/22 UEFI 76/80=95%      Latex Allergy:  [x]NO      []YES  Preferred Language for Healthcare:   [x]English       []other:    Pain level:  010/10 R shoulder w/o medication; 2-3/10 L shoulder at rest 5-6/10 w/ activity;     SUBJECTIVE:  Pt states that R shoulder is feeling better. MD appt went well and cortisone injection offered for L shoulder but pt wanted to wait. Pt has been sick w/ stomach issues the last few days so has not been doing much and hasn't eaten in 2 days. Relevant Medical History: HBP and cardiac condition of stent ; Covid 2021; hx of back fracture  Functional Disability Index: UEFI 68%      Pain Scale: 4-10/10  Easing factors: activity modification;   Provocative factors: shooting basketball; office work; reaching;     Type: [x]? Constant       []? Intermittent [x]?Radiating     []? Localized     []? 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 155 (85!)     Abduction 180 175 155 deg! 160 (75!)     ER 90 90 T1 T1     IR 20 30! T9 T11!     Other             Other                    Strength L R Comment   Flexion 5 5 (2+!) 4/18/2022   Abduction 5 4+(2+!)     ER 5 4+     IR 5 4+     Supraspinatus         Upper Trap         Lower Trap         Mid Trap         Rhomboids         Biceps 5 5     Triceps 5 4+     Horizontal Abduction         Horizontal Adduction         Lats               Special Tests Results/Comment   Estefania Byrd positive   Speeds neg   OBriens     Other     Neurologic Signs     Functional Reach        Reflexes/Sensation:               [x]? Dermatomes/Myotomes intact               [x]? Reflexes equal and normal bilaterally              []?Other:     Joint mobility:               []?Normal                      [x]? Hypo end range for R shoulder;              []?Hyper     Palpation: tightness noted in pec, scapular muscles, infraspinatus, supraspinatus, levator and UT R>L;     Functional Mobility/Transfers: indep in transfers from all positions on eval;     Posture: kyphotic posture w/ rounded shoulders and FHP in upright position; ectomorph body structure;       Test measurements:      RESTRICTIONS/PRECAUTIONS: HBP and cardiac concerns w/ stent implanted 2019    Exercises/Interventions:   Exercises:  Exercise/Equipment Resistance/Repetitions Comments Cardio/Warm-up     Bike          Stretching/PROM     Wand ER @90 10\"x10  Supine AAROM flex 10\"x10      Counter stretch in standing     Doorway stretch 10\"x10    Table Slides     UE [place]     Pulleys     CBA 10\"x10 3/16/22 Rolling over shoulder in side lying position        STRENGTH     Isometrics     Retraction          Weight shift     Flexion     Abduction     External Rotation     Internal Rotation     Biceps     Triceps          PRE's     Flexion      3# sitting 2x10 Both shoulders   Abduction 5# 3x10 Both shoulders    scaption 3# 1x10    External Rotation Side lying 3# 3x10 Both shoulders   Internal Rotation     Shrugs     EXT     Reverse Flys     Serratus 10# 3x10         Horizontal Abd with ER     Biceps     Triceps     Retraction Prone 2# 3x10         Cable Column/Theraband     External Rotation Vs wall, no $ 3x10 blue    Internal Rotation     Shrugs     Lats Machine 60# 3x10    Ext     Flex     Scapular Retraction- horizontal      Prone 3x10    Scapular retraction Machine 60# 3x10    BIC     TRIC     PNF          Neuromuscular Re-ed     Dynamic Stability     Ball vs wall     Diagonal V pattern  + back to program NPV   Dynamic ER vs wall NPV              Ed given on diagnosis, expectations & goals X10'; 3/30/22 updated                   Manual/Modalities                       Therapeutic Exercise and NMR EXR  [x] (08921) Provided verbal/tactile cueing for activities related to strengthening, flexibility, endurance, ROM  for improvements in scapular, scapulothoracic and UE control with self care, reaching, carrying, lifting, house/yardwork, driving/computer work.    [] (50113) Provided verbal/tactile cueing for activities related to improving balance, coordination, kinesthetic sense, posture, motor skill, proprioception  to assist with  scapular, scapulothoracic and UE control with self care, reaching, carrying, lifting, house/yardwork, driving/computer work.     Therapeutic Activities:    [] (61792 or cervical/CT, scapular GHJ and UE for the purpose of modulating pain, promoting relaxation,  increasing ROM, reducing/eliminating soft tissue swelling/inflammation/restriction, improving soft tissue extensibility and allowing for proper ROM for normal function with self care, reaching, carrying, lifting, house/yardwork, driving/computer work    Modalities: CP x10 min  To both shoulders   [] GAME READY (VASO)- for significant edema, swelling, pain control. Charges:  Timed Code Treatment Minutes: 55   Total Treatment Minutes: 65     [] EVAL (LOW) 86063 (typically 20 minutes face-to-face)  [] EVAL (MOD) 78844 (typically 30 minutes face-to-face)  [] EVAL (HIGH) 62762 (typically 45 minutes face-to-face)  [] RE-EVAL   [x] ST(40833)5 x 30'    [] IONTO  [] NMR (34954) x     [] VASO  [x] Manual (74487)1x 10'      [] Other:  [x] TA (10076)1 x 15'     [] Mech Traction (25044)  [] ES(attended) (15642)      [] ES (un) (08557):       GOALS:  Patient stated goal: use dominant UE w/o deviations or pain in shoulder. []? Progressing: []? Met: []? Not Met: []? Adjusted     Therapist goals for Patient:   Short Term Goals: To be achieved in: 2 weeks  1. Independent in HEP and progression per patient tolerance, in order to prevent re-injury. []? Progressing: []? Met: []? Not Met: []? Adjusted  2. Patient will have a decrease in pain to facilitate improvement in movement, function, and ADLs as indicated by Functional Deficits. []? Progressing: []? Met: []? Not Met: []? Adjusted     Long Term Goals: To be achieved in: 12 weeks  1. Disability index score of 15% or less for the DASH to assist with reaching prior level of function. []? Progressing: []? Met: []? Not Met: []? Adjusted  2. Patient will demonstrate increased AROM to WNL's to allow for proper joint functioning as indicated by patients Functional Deficits. []? Progressing: []? Met: []? Not Met: []? Adjusted  3.  Patient will demonstrate an increase in Strength to 5/5 to allow for proper functional mobility as indicated by patients Functional Deficits. []? Progressing: []? Met: []? Not Met: []? Adjusted  4. Patient will return to all functional activities without increased symptoms or restriction. []? Progressing: []? Met: []? Not Met: []? Adjusted  5. Pt will be able to return to playing basketball without problems. (patient specific functional goal)    []? Progressing: []? Met: []? Not Met: []? Adjusted          Overall Progression Towards Functional goals/ Treatment Progress Update:  [] Patient is progressing as expected towards functional goals listed. [] Progression is slowed due to complexities/Impairments listed. [] Progression has been slowed due to co-morbidities. [x] Plan just implemented, too soon to assess goals progression <30days   [] Goals require adjustment due to lack of progress  [] Patient is not progressing as expected and requires additional follow up with physician  [] Other    ASSESSMENT:  Pt was not feeling well so went slower w/ program but able to complete all exercises. Pt did not show the fatigue in L shoulder that was visible on last session. L shoulder was not hurting today that may have helped w/ function. ROM full in both shoulders. Treatment/Activity Tolerance:  [x] Patient tolerated treatment well [] Patient limited by fatique  [] Patient limited by pain  [] Patient limited by other medical complications  [] Other:     Prognosis: [x] Good [] Fair  [] Poor    Patient Requires Follow-up: [x] Yes  [] No    PLAN: Cont therapy for stability and control of shoulder. Recommend that pt continue therapy to improve strength and stability in shoulder.    [x] Continue per plan of care [] Alter current plan (see comments)  [x] Plan of care initiated [] Hold pending MD visit [] Discharge    Electronically signed by: Brennen Pettit, PT, MS, OMT-C      Physical Therapist [place] license #[id number]  Physical Therapist [place] license #[id number]     Note: If patient does not return for scheduled/ recommended follow up visits, this note will serve as a discharge from care along with most recent update on progress.

## 2022-04-25 ENCOUNTER — TREATMENT (OUTPATIENT)
Dept: PHYSICAL THERAPY | Age: 52
End: 2022-04-25
Payer: COMMERCIAL

## 2022-04-25 DIAGNOSIS — G89.29 CHRONIC RIGHT SHOULDER PAIN: Primary | ICD-10-CM

## 2022-04-25 DIAGNOSIS — M25.511 CHRONIC RIGHT SHOULDER PAIN: Primary | ICD-10-CM

## 2022-04-25 PROCEDURE — 97530 THERAPEUTIC ACTIVITIES: CPT | Performed by: PHYSICAL THERAPIST

## 2022-04-25 PROCEDURE — 97140 MANUAL THERAPY 1/> REGIONS: CPT | Performed by: PHYSICAL THERAPIST

## 2022-04-25 PROCEDURE — 97110 THERAPEUTIC EXERCISES: CPT | Performed by: PHYSICAL THERAPIST

## 2022-04-25 NOTE — PROGRESS NOTES
----- Message from Kory Gresham LPN sent at 7/24/2019  2:45 PM CDT -----      ----- Message -----  From: Gris Samuel  Sent: 7/24/2019   1:22 PM  To: Tj Ayers Staff    Type:  RX Refill Request    Who Called: Cherri   Refill apremilast (OTEZLA) 30 mg Tab    How is the patient currently taking it? 2 twice a day  Is this a 90 day RX:  Preferred Pharmacy with phone number:[facility] phone [phone]  Local   Ordering Provider:Tj  Would the pharmacy rather a call back  Best Call Back Number:[phone]  Additional Information: Thank you!   [facility]   Phone: [phone]    Fax: [phone]      Physical Therapy Treatment Note/ Progress Report:           Date:  2022    Patient Name:  Apurva Johnson    :  1970  MRN: [id number]  Restrictions/Precautions:    Medical/Treatment Diagnosis Information:  · Diagnosis: M25.511 R shoulder pain chronicity  ·  Treating Diagnosis: limited ROM, pain in shoulder, dec strength in shoulder, dyskinesia scapula  Insurance/Certification information:  PT Insurance Information: [employer]  Physician Information:  Referring Practitioner: Dr. Barbara Schmidt  Has the plan of care been signed (Y/N):        []  Yes  [x]  No     Date of Patient follow up with Physician:       Is this a Progress Report:     [x]  Yes  []  No        If Yes:  Date Range for reporting period:  Beginning 22  Ending 22    Progress report will be due (10 Rx or 30 days whichever is less):       Recertification will be due (POC Duration  / 90 days whichever is less): 22        Visit # Insurance Allowable Auth Required   10 25 []  Yes []  No        Functional Scale: UEFI 68%    Date assessed:  3/14/2022  4/18/22 UEFI 76/80=95%      Latex Allergy:  [x]NO      []YES  Preferred Language for Healthcare:   [x]English       []other:    Pain level:  010/10 R shoulder w/o medication; 2-3/10 L shoulder at rest 5-6/10 w/ activity;     SUBJECTIVE:  Pt states that he depressed his shoulder when moving around in his car seat and had immediate pain in R shoulder that lasted ~24 hrs. He did ice and symptoms are resolved today. He held off exercises yesterday. L shoulder has been feeling well w/o symptoms. Pt had been working in the yard for ~ 4 hrs earlier that day w/o symptoms. He was using weed eater, driving riding  and int usage of push mower.      Relevant Medical History: HBP and cardiac condition of stent ; Covid 2021; hx of back fracture  Functional Disability Index: UEFI 68%    Pain Scale: 4-10/10  Easing factors: activity modification;   Provocative factors: shooting basketball; office work; reaching;     Type: [x]? Constant       []? Intermittent  [x]? Radiating     []? Localized     []? 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 155 (85!)     Abduction 180 175 155 deg! 160 (75!)     ER 90 90 T1 T1     IR 20 30! T9 T11!     Other             Other                    Strength L R Comment   Flexion 5 5 (2+!) 4/18/2022   Abduction 5 4+(2+!)     ER 5 4+     IR 5 4+     Supraspinatus         Upper Trap         Lower Trap         Mid Trap         Rhomboids         Biceps 5 5     Triceps 5 4+     Horizontal Abduction         Horizontal Adduction         Lats               Special Tests Results/Comment   Estefania     Neers positive   Speeds neg   OBriens     Other     Neurologic Signs     Functional Reach        Reflexes/Sensation:               [x]? Dermatomes/Myotomes intact               [x]? Reflexes equal and normal bilaterally              []?Other:     Joint mobility:               []?Normal                      [x]? Hypo end range for R shoulder;              []?Hyper     Palpation: tightness noted in pec, scapular muscles, infraspinatus, supraspinatus, levator and UT R>L;     Functional Mobility/Transfers: indep in transfers from all positions on eval;     Posture: kyphotic posture w/ rounded shoulders and FHP in upright position; ectomorph body structure;       Test measurements: RESTRICTIONS/PRECAUTIONS: HBP and cardiac concerns w/ stent implanted 2019    Exercises/Interventions:   Exercises:  Exercise/Equipment Resistance/Repetitions Comments   Cardio/Warm-up     Bike          Stretching/PROM     Wand ER @90 10\"x10  Supine AAROM flex 10\"x10      Counter stretch in standing     Doorway stretch 10\"x10    Table Slides     UE [place]     Pulleys x4' warm up     CBA 10\"x10 3/16/22 Rolling over shoulder in side lying position        STRENGTH     Isometrics     Retraction          Weight shift     Flexion     Abduction     External Rotation     Internal Rotation     Biceps     Triceps          PRE's     Flexion      5# sitting 2x10 Both shoulders   Abduction  Both shoulders    scaption 5# 3x10 seated    External Rotation Side lying 3# 3x10 Both shoulders   Internal Rotation     Shrugs     EXT     Reverse Flys     Serratus 10# 3x10         Horizontal Abd with ER     Biceps     Triceps     Retraction Prone 2# 3x10         Cable Column/Theraband     External Rotation Vs wall, no $ 3x10 blue    Internal Rotation     Shrugs     Lats Machine 75# 3x10 Inc wt NPV   Ext     Flex     Scapular Retraction- horizontal      Prone 3x10    Scapular retraction Machine 75# 3x15    BIC 10# 3x10    TRIC 60# 3x10    PNF D2 2# in supine x20         Neuromuscular Re-ed     Dynamic Stability     Ball vs wall     Diagonal V pattern  + back to program NPV   Dynamic ER vs wall NPV              Ed given on diagnosis, expectations & goals X10'; 3/30/22 updated                   Manual/Modalities                       Therapeutic Exercise and NMR EXR  [x] (04177) Provided verbal/tactile cueing for activities related to strengthening, flexibility, endurance, ROM  for improvements in scapular, scapulothoracic and UE control with self care, reaching, carrying, lifting, house/yardwork, driving/computer work.    [] (41412) Provided verbal/tactile cueing for activities related to improving balance, coordination, kinesthetic sense, posture, motor skill, proprioception  to assist with  scapular, scapulothoracic and UE control with self care, reaching, carrying, lifting, house/yardwork, driving/computer work. Therapeutic Activities:    [] (33703 or 33501) Provided verbal/tactile cueing for activities related to improving balance, coordination, kinesthetic sense, posture, motor skill, proprioception and motor activation to allow for proper function of scapular, scapulothoracic and UE control with self care, carrying, lifting, driving/computer work. Home Exercise Program:    [x] (56343) Reviewed/Progressed HEP activities related to strengthening, flexibility, endurance, ROM of scapular, scapulothoracic and UE control with self care, reaching, carrying, lifting, house/yardwork, driving/computer work     Written HEP     Access Code: [email or web address]  URL: [email or web address]  Date: 03/30/2022  Prepared by: Brennen Pettit    Exercises  Supine Shoulder Flexion Extension AAROM with Dowel - 2 x daily - 7 x weekly - 1 sets - 10 reps - 10 hold  Supine Cross Body Shoulder Stretch - 2 x daily - 7 x weekly - 1 sets - 10 reps - 10 hold  Supine Shoulder External Rotation with Dowel - 2 x daily - 7 x weekly - 1 sets - 10 reps - 10 hold  Sidelying Shoulder Abduction Palm Forward - 1 x daily - 7 x weekly - 3 sets - 10 reps  Sidelying Shoulder ER with Towel and Dumbbell - 1 x daily - 7 x weekly - 3 sets - 10 reps  Standing Shoulder Row with Anchored Resistance - 1 x daily - 7 x weekly - 3 sets - 10 reps  Standing Shoulder Horizontal Abduction with Resistance - 1 x daily - 7 x weekly - 3 sets - 10 reps  Push Up on Table - 1 x daily - 7 x weekly - 3 sets - 10 reps  Prone Scapular Retraction - 1 x daily - 7 x weekly - 2 sets - 10 reps    [] (23496) Reviewed/Progressed HEP activities related to improving balance, coordination, kinesthetic sense, posture, motor skill, proprioception of scapular, scapulothoracic and UE control with self care, reaching, carrying, lifting, house/yardwork, driving and computer work      Manual Treatments:  PROM / STM / Oscillations-Mobs:  G-I, II, III, IV (PA's, Inf., Post.)  [] (52915) Provided manual therapy to mobilize soft tissue/joints of cervical/CT, scapular GHJ and UE for the purpose of modulating pain, promoting relaxation,  increasing ROM, reducing/eliminating soft tissue swelling/inflammation/restriction, improving soft tissue extensibility and allowing for proper ROM for normal function with self care, reaching, carrying, lifting, house/yardwork, driving/computer work    Modalities: CP x10 min  To both shoulders   [] GAME READY (VASO)- for significant edema, swelling, pain control. Charges:  Timed Code Treatment Minutes: 55   Total Treatment Minutes: 65     [] EVAL (LOW) 42040 (typically 20 minutes face-to-face)  [] EVAL (MOD) 78070 (typically 30 minutes face-to-face)  [] EVAL (HIGH) 15259 (typically 45 minutes face-to-face)  [] RE-EVAL   [x] ARYA(51259)9 x 30'    [] IONTO  [] NMR (08527) x     [] VASO  [x] Manual (54417)1x 10'      [] Other:  [x] TA (47132)0 x 15'     [] Mech Traction (42128)  [] ES(attended) (31550)      [] ES (un) (65534):       GOALS:  Patient stated goal: use dominant UE w/o deviations or pain in shoulder. []? Progressing: []? Met: []? Not Met: []? Adjusted     Therapist goals for Patient:   Short Term Goals: To be achieved in: 2 weeks  1. Independent in HEP and progression per patient tolerance, in order to prevent re-injury. []? Progressing: []? Met: []? Not Met: []? Adjusted  2. Patient will have a decrease in pain to facilitate improvement in movement, function, and ADLs as indicated by Functional Deficits. []? Progressing: []? Met: []? Not Met: []? Adjusted     Long Term Goals: To be achieved in: 12 weeks  1. Disability index score of 15% or less for the DASH to assist with reaching prior level of function. []? Progressing: []? Met: []? Not Met: []? Adjusted  2.  Patient will demonstrate increased AROM to WNL's to allow for proper joint functioning as indicated by patients Functional Deficits. []? Progressing: []? Met: []? Not Met: []? Adjusted  3. Patient will demonstrate an increase in Strength to 5/5 to allow for proper functional mobility as indicated by patients Functional Deficits. []? Progressing: []? Met: []? Not Met: []? Adjusted  4. Patient will return to all functional activities without increased symptoms or restriction. []? Progressing: []? Met: []? Not Met: []? Adjusted  5. Pt will be able to return to playing basketball without problems. (patient specific functional goal)    []? Progressing: []? Met: []? Not Met: []? Adjusted          Overall Progression Towards Functional goals/ Treatment Progress Update:  [] Patient is progressing as expected towards functional goals listed. [] Progression is slowed due to complexities/Impairments listed. [] Progression has been slowed due to co-morbidities. [x] Plan just implemented, too soon to assess goals progression <30days   [] Goals require adjustment due to lack of progress  [] Patient is not progressing as expected and requires additional follow up with physician  [] Other    ASSESSMENT: Mod fatigue from program. Pt did not have any pain from program today despite symptoms complaint on arrival. Cuing needed on scapular retraction. Significant weakness noted in L UE>R UE. Discussed job in the yard and recommendations given. Treatment/Activity Tolerance:  [x] Patient tolerated treatment well [] Patient limited by fatique  [] Patient limited by pain  [] Patient limited by other medical complications  [] Other:     Prognosis: [x] Good [] Fair  [] Poor    Patient Requires Follow-up: [x] Yes  [] No    PLAN: Cont therapy for stability and control of shoulder. Recommend that pt continue therapy to improve strength and stability in shoulder.    [x] Continue per plan of care [] Alter current plan (see comments)  [x] Plan of care initiated [] Hold pending MD visit [] Discharge    Electronically signed by: Mingo Melendez PT, MS, OMT-C      Physical Therapist [id number] [address] license #[id number]  Physical Therapist [place] license #[id number]     Note: If patient does not return for scheduled/ recommended follow up visits, this note will serve as a discharge from care along with most recent update on progress.

## 2022-04-27 ENCOUNTER — TREATMENT (OUTPATIENT)
Dept: PHYSICAL THERAPY | Age: 52
End: 2022-04-27
Payer: COMMERCIAL

## 2022-04-27 DIAGNOSIS — M25.511 CHRONIC RIGHT SHOULDER PAIN: Primary | ICD-10-CM

## 2022-04-27 DIAGNOSIS — G89.29 CHRONIC RIGHT SHOULDER PAIN: Primary | ICD-10-CM

## 2022-04-27 PROCEDURE — 97110 THERAPEUTIC EXERCISES: CPT | Performed by: PHYSICAL THERAPIST

## 2022-04-27 PROCEDURE — 97530 THERAPEUTIC ACTIVITIES: CPT | Performed by: PHYSICAL THERAPIST

## 2022-04-27 NOTE — PROGRESS NOTES
[facility]   Phone: [phone]    Fax: [phone]      Physical Therapy Treatment Note/ Progress Report:           Date:  2022    Patient Name:  Mary Anne Ravi    :  1970  MRN: [id number]  Restrictions/Precautions:    Medical/Treatment Diagnosis Information:  · Diagnosis: M25.511 R shoulder pain chronicity  ·  Treating Diagnosis: limited ROM, pain in shoulder, dec strength in shoulder, dyskinesia scapula  Insurance/Certification information:  PT Insurance Information: Humana  Physician Information:  Referring Practitioner: Dr. Tenorio Au  Has the plan of care been signed (Y/N):        []  Yes  [x]  No     Date of Patient follow up with Physician:       Is this a Progress Report:     [x]  Yes  []  No        If Yes:  Date Range for reporting period:  Beginning 22  Ending 22    Progress report will be due (10 Rx or 30 days whichever is less):       Recertification will be due (POC Duration  / 90 days whichever is less): 22        Visit # Insurance Allowable Auth Required   10 25 []  Yes []  No        Functional Scale: UEFI 68%    Date assessed:  3/14/2022  4/18/22 UEFI 76/80=95%      Latex Allergy:  [x]NO      []YES  Preferred Language for Healthcare:   [x]English       []other:    Pain level:  010/10 R shoulder w/o medication; 2-3/10 L shoulder at rest 5-6/10 w/ activity;     SUBJECTIVE:  Pt states that R shoulder feels good but L shoulder is painful in posterior joint today. Pt has been icing both shoulders at home. Relevant Medical History: HBP and cardiac condition of stent ; Covid 2021; hx of back fracture  Functional Disability Index: UEFI 68%      Pain Scale: 4-10/10  Easing factors: activity modification;   Provocative factors: shooting basketball; office work; reaching;     Type: [x]? Constant       []? Intermittent  [x]? Radiating     []? Localized     []? 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 155 (85!)     Abduction 180 175 155 deg! 160 (75!)     ER 90 90 T1 T1     IR 20 30! T9 T11!     Other             Other                    Strength L R Comment   Flexion 5 5 (2+!) 4/18/2022   Abduction 5 4+(2+!)     ER 5 4+     IR 5 4+     Supraspinatus         Upper Trap         Lower Trap         Mid Trap         Rhomboids         Biceps 5 5     Triceps 5 4+     Horizontal Abduction         Horizontal Adduction         Lats               Special Tests Results/Comment   Estefania Byrd positive   Speeds neg   OBriens     Other     Neurologic Signs     Functional Reach        Reflexes/Sensation:               [x]? Dermatomes/Myotomes intact               [x]? Reflexes equal and normal bilaterally              []?Other:     Joint mobility:               []?Normal                      [x]? Hypo end range for R shoulder;              []?Hyper     Palpation: tightness noted in pec, scapular muscles, infraspinatus, supraspinatus, levator and UT R>L;     Functional Mobility/Transfers: indep in transfers from all positions on eval;     Posture: kyphotic posture w/ rounded shoulders and FHP in upright position; ectomorph body structure;       Test measurements:      RESTRICTIONS/PRECAUTIONS: HBP and cardiac concerns w/ stent implanted 2019    Exercises/Interventions:   Exercises:  Exercise/Equipment Resistance/Repetitions Comments   Cardio/Warm-up     Bike          Stretching/PROM     Wand ER @90 10\"x10  Supine AAROM flex 10\"x10      Counter stretch in standing     Doorway stretch 10\"x10    Table Slides     UE [place]     Pulleys x4' warm up     CBA 10\"x10 3/16/22 Rolling over shoulder in side lying position        STRENGTH     Isometrics     Retraction          Weight shift     Flexion     Abduction     External Rotation     Internal Rotation     Biceps     Triceps          PRE's     Flexion      3# sitting 2x10 Both shoulders   Abduction 5# 3x10 side lying Both shoulders        External Rotation Side lying 3# 3x10 Both shoulders   Internal Rotation     Shrugs     EXT     Reverse Flys     Serratus 10# 3x10         Horizontal Abd with ER     Biceps     Triceps     Retraction Prone 2# 3x10         Cable Column/Theraband     External Rotation Vs wall, no $ 3x10 blue    Internal Rotation     Shrugs     Lats Machine 90# 3x10    Ext     Flex     Scapular Retraction- horizontal      Prone 3x10    Scapular retraction Machine 75# 3x15    BIC 10# 3x10    TRIC 60# 3x10    PNF D2          Neuromuscular Re-ed     Dynamic Stability     Ball vs wall     Diagonal V pattern  + back to program NPV   Dynamic ER vs wall NPV              Ed given on diagnosis, expectations & goals X10'; 3/30/22 updated                   Manual/Modalities                       Therapeutic Exercise and NMR EXR  [x] (13007) Provided verbal/tactile cueing for activities related to strengthening, flexibility, endurance, ROM  for improvements in scapular, scapulothoracic and UE control with self care, reaching, carrying, lifting, house/yardwork, driving/computer work.    [] (88391) Provided verbal/tactile cueing for activities related to improving balance, coordination, kinesthetic sense, posture, motor skill, proprioception  to assist with  scapular, scapulothoracic and UE control with self care, reaching, carrying, lifting, house/yardwork, driving/computer work.     Therapeutic Activities:    [] (03668 or 07466) Provided verbal/tactile cueing for activities related to improving balance, coordination, kinesthetic sense, posture, motor skill, proprioception and motor activation to allow for proper function of scapular, scapulothoracic and UE control with self care, carrying, lifting, driving/computer work. Home Exercise Program:    [x] (19948) Reviewed/Progressed HEP activities related to strengthening, flexibility, endurance, ROM of scapular, scapulothoracic and UE control with self care, reaching, carrying, lifting, house/yardwork, driving/computer work     Written HEP     Access Code: [email or web address]  URL: [email or web address]  Date: 03/30/2022  Prepared by: Elba Rangel    Exercises  Supine Shoulder Flexion Extension AAROM with Dowel - 2 x daily - 7 x weekly - 1 sets - 10 reps - 10 hold  Supine Cross Body Shoulder Stretch - 2 x daily - 7 x weekly - 1 sets - 10 reps - 10 hold  Supine Shoulder External Rotation with Dowel - 2 x daily - 7 x weekly - 1 sets - 10 reps - 10 hold  Sidelying Shoulder Abduction Palm Forward - 1 x daily - 7 x weekly - 3 sets - 10 reps  Sidelying Shoulder ER with Towel and Dumbbell - 1 x daily - 7 x weekly - 3 sets - 10 reps  Standing Shoulder Row with Anchored Resistance - 1 x daily - 7 x weekly - 3 sets - 10 reps  Standing Shoulder Horizontal Abduction with Resistance - 1 x daily - 7 x weekly - 3 sets - 10 reps  Push Up on Table - 1 x daily - 7 x weekly - 3 sets - 10 reps  Prone Scapular Retraction - 1 x daily - 7 x weekly - 2 sets - 10 reps    [] (15793) Reviewed/Progressed HEP activities related to improving balance, coordination, kinesthetic sense, posture, motor skill, proprioception of scapular, scapulothoracic and UE control with self care, reaching, carrying, lifting, house/yardwork, driving and computer work      Manual Treatments:  PROM / STM / Oscillations-Mobs:  G-I, II, III, IV (PA's, Inf., Post.)  [] ([id number]) Provided manual therapy to mobilize soft tissue/joints of cervical/CT, scapular GHJ and UE for the purpose of modulating pain, promoting relaxation, increasing ROM, reducing/eliminating soft tissue swelling/inflammation/restriction, improving soft tissue extensibility and allowing for proper ROM for normal function with self care, reaching, carrying, lifting, house/yardwork, driving/computer work    Modalities: CP x10 min  To both shoulders   [] GAME READY (VASO)- for significant edema, swelling, pain control. Charges:  Timed Code Treatment Minutes: 30   Total Treatment Minutes: 65     [] EVAL (LOW) 29966 (typically 20 minutes face-to-face)  [] EVAL (MOD) 45205 (typically 30 minutes face-to-face)  [] EVAL (HIGH) 37152 (typically 45 minutes face-to-face)  [] RE-EVAL   [x] KG(84129)8 x 15'    [] IONTO  [] NMR (45026) x     [] VASO  [] Manual (88285)1x 10'      [] Other:  [x] TA (12856)0 x 15'     [] Mech Traction (27730)  [] ES(attended) (63576)      [] ES (un) (81345):       GOALS:  Patient stated goal: use dominant UE w/o deviations or pain in shoulder. []? Progressing: []? Met: []? Not Met: []? Adjusted     Therapist goals for Patient:   Short Term Goals: To be achieved in: 2 weeks  1. Independent in HEP and progression per patient tolerance, in order to prevent re-injury. []? Progressing: []? Met: []? Not Met: []? Adjusted  2. Patient will have a decrease in pain to facilitate improvement in movement, function, and ADLs as indicated by Functional Deficits. []? Progressing: []? Met: []? Not Met: []? Adjusted     Long Term Goals: To be achieved in: 12 weeks  1. Disability index score of 15% or less for the DASH to assist with reaching prior level of function. []? Progressing: []? Met: []? Not Met: []? Adjusted  2. Patient will demonstrate increased AROM to WNL's to allow for proper joint functioning as indicated by patients Functional Deficits. []? Progressing: []? Met: []? Not Met: []? Adjusted  3. Patient will demonstrate an increase in Strength to 5/5 to allow for proper functional mobility as indicated by patients Functional Deficits.    []? Progressing: []? Met: []? Not Met: []? Adjusted  4. Patient will return to all functional activities without increased symptoms or restriction. []? Progressing: []? Met: []? Not Met: []? Adjusted  5. Pt will be able to return to playing basketball without problems. (patient specific functional goal)    []? Progressing: []? Met: []? Not Met: []? Adjusted          Overall Progression Towards Functional goals/ Treatment Progress Update:  [] Patient is progressing as expected towards functional goals listed. [] Progression is slowed due to complexities/Impairments listed. [] Progression has been slowed due to co-morbidities. [x] Plan just implemented, too soon to assess goals progression <30days   [] Goals require adjustment due to lack of progress  [] Patient is not progressing as expected and requires additional follow up with physician  [] Other    ASSESSMENT: Mod fatigue from program.Pt was tender in supraspinatus muscle in L shoulder so modified program to avoid irritation. No pain w/ any exercises. Strength and stability in shoulder progressing. Cuing for postural correction needed during session. Treatment/Activity Tolerance:  [x] Patient tolerated treatment well [] Patient limited by fatique  [] Patient limited by pain  [] Patient limited by other medical complications  [] Other:     Prognosis: [x] Good [] Fair  [] Poor    Patient Requires Follow-up: [x] Yes  [] No    PLAN: Cont therapy for stability and control of shoulder. Recommend that pt continue therapy to improve strength and stability in shoulder.    [x] Continue per plan of care [] Alter current plan (see comments)  [x] Plan of care initiated [] Hold pending MD visit [] Discharge    Electronically signed by: Gia Mcgee, PT, MS, OMT-C      Physical Therapist [address] [address] license #[id number]  Physical Therapist [place] license #[id number]     Note: If patient does not return for scheduled/ recommended follow up visits, this note will serve as a discharge from care along with most recent update on progress.

## 2022-04-28 NOTE — PROGRESS NOTES
[facility]   Phone: [phone]    Fax: [phone]      Physical Therapy Treatment Note/ Progress Report:           Date:  2022    Patient Name:  Reilly Renteria    :  1970  MRN: [id number]  Restrictions/Precautions:    Medical/Treatment Diagnosis Information:  · Diagnosis: M25.511 R shoulder pain chronicity  ·  Treating Diagnosis: limited ROM, pain in shoulder, dec strength in shoulder, dyskinesia scapula  Insurance/Certification information:  PT Insurance Information: Humana  Physician Information:  Referring Practitioner: Dr. Rosa Garrett  Has the plan of care been signed (Y/N):        []  Yes  [x]  No     Date of Patient follow up with Physician:       Is this a Progress Report:     [x]  Yes  []  No        If Yes:  Date Range for reporting period:  Beginning 22  Ending 22    Progress report will be due (10 Rx or 30 days whichever is less): 27      Recertification will be due (POC Duration  / 90 days whichever is less): 22        Visit # Insurance Allowable Auth Required    []  Yes []  No        Functional Scale: UEFI 68%    Date assessed:  3/14/2022  4/18/22 UEFI 76/80=95%      Latex Allergy:  [x]NO      []YES  Preferred Language for Healthcare:   [x]English       []other:    Pain level:  010/10 R shoulder w/o medication; 2-3/10 L shoulder at rest 5-6/10 w/ activity;     SUBJECTIVE:  Pt states that R shoulder and L shoulder are feeling better. Pt sleeping well w/o symptoms. Past Medical History: HBP and cardiac condition of stent ; Covid 2021; hx of back fracture  Functional Disability Index: UEFI 68%      Pain Scale: 4-10/10  Easing factors: activity modification;   Provocative factors: shooting basketball; office work; reaching;     Type: [x]? Constant       []? Intermittent  [x]? Radiating     []? Localized     []? 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 155 (85!)     Abduction 180 175 155 deg! 160 (75!)     ER 90 90 T1 T1     IR 20 30! T9 T11!     Other             Other                    Strength L R Comment   Flexion 5 5 (2+!) 4/18/2022   Abduction 5 4+(2+!)     ER 5 4+     IR 5 4+     Supraspinatus         Upper Trap         Lower Trap         Mid Trap         Rhomboids         Biceps 5 5     Triceps 5 4+     Horizontal Abduction         Horizontal Adduction         Lats               Special Tests Results/Comment   Estefania     Nealeshia positive   Speeds neg   OBriens     Other     Neurologic Signs     Functional Reach        Reflexes/Sensation:               [x]? Dermatomes/Myotomes intact               [x]? Reflexes equal and normal bilaterally              []?Other:     Joint mobility:               []?Normal                      [x]? Hypo end range for R shoulder;              []?Hyper     Palpation: tightness noted in pec, scapular muscles, infraspinatus, supraspinatus, levator and UT R>L;     Functional Mobility/Transfers: indep in transfers from all positions on eval;     Posture: kyphotic posture w/ rounded shoulders and FHP in upright position; ectomorph body structure;       Test measurements:      RESTRICTIONS/PRECAUTIONS: HBP and cardiac concerns w/ stent implanted 2019    Exercises/Interventions:   Exercises:  Exercise/Equipment Resistance/Repetitions Comments   Cardio/Warm-up     Bike          Stretching/PROM     Wand ER @90 10\"x10  Supine AAROM flex 10\"x10      Counter stretch in standing     Doorway stretch 10\"x10 Table Slides     UE [place]     Pulleys x4' warm up     CBA 10\"x10 3/16/22 Rolling over shoulder in side lying position        STRENGTH     Isometrics     Retraction          Weight shift     Flexion     Abduction     External Rotation     Internal Rotation     Biceps     Triceps          PRE's     Flexion      3# sitting 2x10 Both shoulders   Abduction 5# 3x10 side lying Both shoulders        External Rotation Side lying 3# 3x10 Both shoulders   Internal Rotation     Shrugs     EXT     Reverse Flys     Serratus 10# 3x10         Horizontal Abd with ER     Biceps     Triceps     Retraction Prone 2# 3x10         Cable Column/Theraband     External Rotation Vs wall, no $ 3x10 blue    Internal Rotation     Shrugs     Lats Machine 90# 3x10    Ext     Flex     Scapular Retraction- horizontal      Prone 3x10    Scapular retraction Machine 75# 3x15    BIC 10# 3x10    TRIC 60# 3x10    PNF D2          Neuromuscular Re-ed     Dynamic Stability     Ball vs wall     Diagonal V pattern x30    Dynamic ER vs wall NPV              Ed given on diagnosis, expectations & goals X10'; 3/30/22 updated                   Manual/Modalities      IASTM using flyby x5'    PROM of B shoulder x10'           Therapeutic Exercise and NMR EXR  [x] (58646) Provided verbal/tactile cueing for activities related to strengthening, flexibility, endurance, ROM  for improvements in scapular, scapulothoracic and UE control with self care, reaching, carrying, lifting, house/yardwork, driving/computer work.    [] (58004) Provided verbal/tactile cueing for activities related to improving balance, coordination, kinesthetic sense, posture, motor skill, proprioception  to assist with  scapular, scapulothoracic and UE control with self care, reaching, carrying, lifting, house/yardwork, driving/computer work.     Therapeutic Activities:    [] (93769 or 69517) Provided verbal/tactile cueing for activities related to improving balance, coordination, kinesthetic sense, posture, motor skill, proprioception and motor activation to allow for proper function of scapular, scapulothoracic and UE control with self care, carrying, lifting, driving/computer work. Home Exercise Program:    [x] (05068) Reviewed/Progressed HEP activities related to strengthening, flexibility, endurance, ROM of scapular, scapulothoracic and UE control with self care, reaching, carrying, lifting, house/yardwork, driving/computer work     Written HEP     Access Code: [email or web address]  URL: [email or web address]/  Date: 03/30/2022  Prepared by: Lionel Villafana    Exercises  Supine Shoulder Flexion Extension AAROM with Dowel - 2 x daily - 7 x weekly - 1 sets - 10 reps - 10 hold  Supine Cross Body Shoulder Stretch - 2 x daily - 7 x weekly - 1 sets - 10 reps - 10 hold  Supine Shoulder External Rotation with Dowel - 2 x daily - 7 x weekly - 1 sets - 10 reps - 10 hold  Sidelying Shoulder Abduction Palm Forward - 1 x daily - 7 x weekly - 3 sets - 10 reps  Sidelying Shoulder ER with Towel and Dumbbell - 1 x daily - 7 x weekly - 3 sets - 10 reps  Standing Shoulder Row with Anchored Resistance - 1 x daily - 7 x weekly - 3 sets - 10 reps  Standing Shoulder Horizontal Abduction with Resistance - 1 x daily - 7 x weekly - 3 sets - 10 reps  Push Up on Table - 1 x daily - 7 x weekly - 3 sets - 10 reps  Prone Scapular Retraction - 1 x daily - 7 x weekly - 2 sets - 10 reps    [] (95080) Reviewed/Progressed HEP activities related to improving balance, coordination, kinesthetic sense, posture, motor skill, proprioception of scapular, scapulothoracic and UE control with self care, reaching, carrying, lifting, house/yardwork, driving and computer work      Manual Treatments:  PROM / STM / Oscillations-Mobs:  G-I, II, III, IV (PA's, Inf., Post.)  [] ([id number]) Provided manual therapy to mobilize soft tissue/joints of cervical/CT, scapular GHJ and UE for the purpose of modulating pain, promoting relaxation,  increasing ROM, reducing/eliminating soft tissue swelling/inflammation/restriction, improving soft tissue extensibility and allowing for proper ROM for normal function with self care, reaching, carrying, lifting, house/yardwork, driving/computer work    Modalities: CP x10 min  To both shoulders   [] GAME READY (VASO)- for significant edema, swelling, pain control. Charges:  Timed Code Treatment Minutes: 50   Total Treatment Minutes: 60     [] EVAL (LOW) 20024 (typically 20 minutes face-to-face)  [] EVAL (MOD) 77892 (typically 30 minutes face-to-face)  [] EVAL (HIGH) 34676 (typically 45 minutes face-to-face)  [] RE-EVAL   [x] WE(96639)2 x 20'    [] IONTO  [] NMR (53410) x     [] VASO  [x] Manual (41287)1x 15'      [] Other:  [x] TA (34670)4 x 15'     [] Mech Traction (07468)  [] ES(attended) (72555)      [] ES (un) (86868):       GOALS:  Patient stated goal: use dominant UE w/o deviations or pain in shoulder. []? Progressing: []? Met: []? Not Met: []? Adjusted     Therapist goals for Patient:   Short Term Goals: To be achieved in: 2 weeks  1. Independent in HEP and progression per patient tolerance, in order to prevent re-injury. []? Progressing: []? Met: []? Not Met: []? Adjusted  2. Patient will have a decrease in pain to facilitate improvement in movement, function, and ADLs as indicated by Functional Deficits. []? Progressing: []? Met: []? Not Met: []? Adjusted     Long Term Goals: To be achieved in: 12 weeks  1. Disability index score of 15% or less for the DASH to assist with reaching prior level of function. []? Progressing: []? Met: []? Not Met: []? Adjusted  2. Patient will demonstrate increased AROM to WNL's to allow for proper joint functioning as indicated by patients Functional Deficits. []? Progressing: []? Met: []? Not Met: []? Adjusted  3. Patient will demonstrate an increase in Strength to 5/5 to allow for proper functional mobility as indicated by patients Functional Deficits. []?  Progressing: []? Met: []? Not Met: []? Adjusted  4. Patient will return to all functional activities without increased symptoms or restriction. []? Progressing: []? Met: []? Not Met: []? Adjusted  5. Pt will be able to return to playing basketball without problems. (patient specific functional goal)    []? Progressing: []? Met: []? Not Met: []? Adjusted          Overall Progression Towards Functional goals/ Treatment Progress Update:  [] Patient is progressing as expected towards functional goals listed. [] Progression is slowed due to complexities/Impairments listed. [] Progression has been slowed due to co-morbidities. [x] Plan just implemented, too soon to assess goals progression <30days   [] Goals require adjustment due to lack of progress  [] Patient is not progressing as expected and requires additional follow up with physician  [] Other    ASSESSMENT: ROM in L shoulder full motion w/o pain. Pt does fatigue from exercises in L>R. Believe that pt is weaker in L shoulder and fatigues quicker w/ clicking noted in L shoulder & scapular. Pt did not have pain in either shoulder w/ program while performing it. Pt exhibits good tech w/ exercises and encouraged not to push too hard through fatigue due to possible deviations. Treatment/Activity Tolerance:  [x] Patient tolerated treatment well [] Patient limited by fatique  [] Patient limited by pain  [] Patient limited by other medical complications  [] Other:     Prognosis: [x] Good [] Fair  [] Poor    Patient Requires Follow-up: [x] Yes  [] No    PLAN: Cont therapy for stability and control of shoulder. Recommend that pt continue therapy to improve strength and stability in shoulder.    [x] Continue per plan of care [] Alter current plan (see comments)  [x] Plan of care initiated [] Hold pending MD visit [] Discharge    Electronically signed by: Wes Portillo, PT, MS, OMT-C      Physical Therapist [place] license #[id number]  Physical Therapist [facility] license #[id number]     Note: If patient does not return for scheduled/ recommended follow up visits, this note will serve as a discharge from care along with most recent update on progress.

## 2022-05-02 ENCOUNTER — TREATMENT (OUTPATIENT)
Dept: PHYSICAL THERAPY | Age: 52
End: 2022-05-02
Payer: COMMERCIAL

## 2022-05-02 DIAGNOSIS — M25.511 CHRONIC RIGHT SHOULDER PAIN: Primary | ICD-10-CM

## 2022-05-02 DIAGNOSIS — G89.29 CHRONIC RIGHT SHOULDER PAIN: Primary | ICD-10-CM

## 2022-05-02 PROCEDURE — 97110 THERAPEUTIC EXERCISES: CPT | Performed by: PHYSICAL THERAPIST

## 2022-05-02 PROCEDURE — 97530 THERAPEUTIC ACTIVITIES: CPT | Performed by: PHYSICAL THERAPIST

## 2022-05-02 PROCEDURE — 97140 MANUAL THERAPY 1/> REGIONS: CPT | Performed by: PHYSICAL THERAPIST

## 2022-05-04 ENCOUNTER — TREATMENT (OUTPATIENT)
Dept: PHYSICAL THERAPY | Age: 52
End: 2022-05-04
Payer: COMMERCIAL

## 2022-05-04 DIAGNOSIS — G89.29 CHRONIC RIGHT SHOULDER PAIN: Primary | ICD-10-CM

## 2022-05-04 DIAGNOSIS — M25.511 CHRONIC RIGHT SHOULDER PAIN: Primary | ICD-10-CM

## 2022-05-04 PROCEDURE — 97110 THERAPEUTIC EXERCISES: CPT | Performed by: PHYSICAL THERAPIST

## 2022-05-04 PROCEDURE — 97530 THERAPEUTIC ACTIVITIES: CPT | Performed by: PHYSICAL THERAPIST

## 2022-05-04 PROCEDURE — 97140 MANUAL THERAPY 1/> REGIONS: CPT | Performed by: PHYSICAL THERAPIST

## 2022-05-04 NOTE — PROGRESS NOTES
Fady Lozano [facility]   Phone: [phone]    Fax: [phone]      Physical Therapy Treatment Note/ Progress Report:           Date:  2022    Patient Name:  Maki Medina    :  1970  MRN: [id number]  Restrictions/Precautions:    Medical/Treatment Diagnosis Information:  · Diagnosis: M25.511 R shoulder pain chronicity  ·  Treating Diagnosis: limited ROM, pain in shoulder, dec strength in shoulder, dyskinesia scapula  Insurance/Certification information:  PT Insurance Information: Humana  Physician Information:  Referring Practitioner: Dr. Maria Singleton  Has the plan of care been signed (Y/N):        []  Yes  [x]  No     Date of Patient follow up with Physician:       Is this a Progress Report:     [x]  Yes  []  No        If Yes:  Date Range for reporting period:  Beginning 22  Ending 22    Progress report will be due (10 Rx or 30 days whichever is less): 46      Recertification will be due (POC Duration  / 90 days whichever is less): 22        Visit # Insurance Allowable Auth Required   12  []  Yes []  No        Functional Scale: UEFI 68%    Date assessed:  3/14/2022  4/18/22 UEFI 76/80=95%      Latex Allergy:  [x]NO      []YES  Preferred Language for Healthcare:   [x]English       []other:    Pain level:  010/10 R shoulder w/o medication; 2-3/10 L shoulder at rest 5-6/10 w/ activity;     SUBJECTIVE:  Pt states that R shoulder and L shoulder are feeling better. Pt sleeping well w/o symptoms. Past Medical History: HBP and cardiac condition of stent ; Covid 2021; hx of back fracture  Functional Disability Index: UEFI 68%      Pain Scale: 4-10/10  Easing factors: activity modification;   Provocative factors: shooting basketball; office work; reaching;     Type: [x]? Constant       []? Intermittent  [x]? Radiating     []? Localized     []? 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 155 (85!)     Abduction 180 175 155 deg! 160 (75!)     ER 90 90 T1 T1     IR 20 30! T9 T11!     Other             Other                    Strength L R Comment   Flexion 5 5 (2+!) 4/18/2022   Abduction 5 4+(2+!)     ER 5 4+     IR 5 4+     Supraspinatus         Upper Trap         Lower Trap         Mid Trap         Rhomboids         Biceps 5 5     Triceps 5 4+     Horizontal Abduction         Horizontal Adduction         Lats               Special Tests Results/Comment   Estefania     Nealeshia positive   Speeds neg   OBriens     Other     Neurologic Signs     Functional Reach        Reflexes/Sensation:               [x]? Dermatomes/Myotomes intact               [x]? Reflexes equal and normal bilaterally              []?Other:     Joint mobility:               []?Normal                      [x]? Hypo end range for R shoulder;              []?Hyper     Palpation: tightness noted in pec, scapular muscles, infraspinatus, supraspinatus, levator and UT R>L;     Functional Mobility/Transfers: indep in transfers from all positions on eval;     Posture: kyphotic posture w/ rounded shoulders and FHP in upright position; ectomorph body structure;       Test measurements:      RESTRICTIONS/PRECAUTIONS: HBP and cardiac concerns w/ stent implanted 2019    Exercises/Interventions:   Exercises:  Exercise/Equipment Resistance/Repetitions Comments   Cardio/Warm-up     Bike          Stretching/PROM     Wand ER @90 10\"x10  Supine AAROM flex 10\"x10      Counter stretch in standing     Doorway stretch 10\"x10 Table Slides     UE [place]     Pulleys x4' warm up     CBA 10\"x10 3/16/22 Rolling over shoulder in side lying position        STRENGTH     Isometrics     Retraction          Weight shift     Flexion     Abduction     External Rotation     Internal Rotation     Biceps     Triceps          PRE's     Flexion      3# sitting 2x10 Both shoulders   Abduction 5# 3x10 side lying Both shoulders Inc NPV        External Rotation Side lying 3# 3x10 Both shoulders   Internal Rotation     Shrugs     EXT     Reverse Flys     Serratus 10# 3x10 Inc wt NPV        Horizontal Abd with ER     Biceps     Triceps     Retraction Prone 2# 3x10         Cable Column/Theraband     External Rotation Vs wall, no $ 3x10 blue    Internal Rotation     Shrugs     Lats Machine 90# 3x10    Ext     Flex     Scapular Retraction- horizontal      Prone 3x10    Scapular retraction Machine 90# 3x15    BIC 12# 3x10    TRIC 60# 3x10    PNF D2          Neuromuscular Re-ed     Dynamic Stability     Ball vs wall     Diagonal V pattern     Dynamic ER vs wall 10              Ed given on diagnosis, expectations & goals X10'; 3/30/22 updated                   Manual/Modalities      IASTM using flyby x5'    PROM of B shoulder x10'           Therapeutic Exercise and NMR EXR  [x] (26014) Provided verbal/tactile cueing for activities related to strengthening, flexibility, endurance, ROM  for improvements in scapular, scapulothoracic and UE control with self care, reaching, carrying, lifting, house/yardwork, driving/computer work.    [] (37254) Provided verbal/tactile cueing for activities related to improving balance, coordination, kinesthetic sense, posture, motor skill, proprioception  to assist with  scapular, scapulothoracic and UE control with self care, reaching, carrying, lifting, house/yardwork, driving/computer work.     Therapeutic Activities:    [] (52375 or ) Provided verbal/tactile cueing for activities related to improving balance, coordination, kinesthetic sense, posture, motor skill, proprioception and motor activation to allow for proper function of scapular, scapulothoracic and UE control with self care, carrying, lifting, driving/computer work. Home Exercise Program:    [x] (94407) Reviewed/Progressed HEP activities related to strengthening, flexibility, endurance, ROM of scapular, scapulothoracic and UE control with self care, reaching, carrying, lifting, house/yardwork, driving/computer work     Written HEP     Access Code: [email or web address]  URL: [email or web address]  Date: 03/30/2022  Prepared by: Gia Salvage    Exercises  Supine Shoulder Flexion Extension AAROM with Dowel - 2 x daily - 7 x weekly - 1 sets - 10 reps - 10 hold  Supine Cross Body Shoulder Stretch - 2 x daily - 7 x weekly - 1 sets - 10 reps - 10 hold  Supine Shoulder External Rotation with Dowel - 2 x daily - 7 x weekly - 1 sets - 10 reps - 10 hold  Sidelying Shoulder Abduction Palm Forward - 1 x daily - 7 x weekly - 3 sets - 10 reps  Sidelying Shoulder ER with Towel and Dumbbell - 1 x daily - 7 x weekly - 3 sets - 10 reps  Standing Shoulder Row with Anchored Resistance - 1 x daily - 7 x weekly - 3 sets - 10 reps  Standing Shoulder Horizontal Abduction with Resistance - 1 x daily - 7 x weekly - 3 sets - 10 reps  Push Up on Table - 1 x daily - 7 x weekly - 3 sets - 10 reps  Prone Scapular Retraction - 1 x daily - 7 x weekly - 2 sets - 10 reps    [] (24823) Reviewed/Progressed HEP activities related to improving balance, coordination, kinesthetic sense, posture, motor skill, proprioception of scapular, scapulothoracic and UE control with self care, reaching, carrying, lifting, house/yardwork, driving and computer work      Manual Treatments:  PROM / STM / Oscillations-Mobs:  G-I, II, III, IV (PA's, Inf., Post.)  [] ([id number]) Provided manual therapy to mobilize soft tissue/joints of cervical/CT, scapular GHJ and UE for the purpose of modulating pain, promoting relaxation,  increasing ROM, reducing/eliminating soft tissue swelling/inflammation/restriction, improving soft tissue extensibility and allowing for proper ROM for normal function with self care, reaching, carrying, lifting, house/yardwork, driving/computer work    Modalities: CP x10 min  To both shoulders   [] GAME READY (VASO)- for significant edema, swelling, pain control. Charges:  Timed Code Treatment Minutes: 55   Total Treatment Minutes: 65     [] EVAL (LOW) 95970 (typically 20 minutes face-to-face)  [] EVAL (MOD) 82295 (typically 30 minutes face-to-face)  [] EVAL (HIGH) 31290 (typically 45 minutes face-to-face)  [] RE-EVAL   [x] GO(78273)0 x 25'    [] IONTO  [] NMR (60229) x     [] VASO  [x] Manual (47688)1x 15'      [] Other:  [x] TA (53902)5 x 15'     [] Mech Traction (16496)  [] ES(attended) (42072)      [] ES (un) (28215):       GOALS:  Patient stated goal: use dominant UE w/o deviations or pain in shoulder. []? Progressing: []? Met: []? Not Met: []? Adjusted     Therapist goals for Patient:   Short Term Goals: To be achieved in: 2 weeks  1. Independent in HEP and progression per patient tolerance, in order to prevent re-injury. []? Progressing: []? Met: []? Not Met: []? Adjusted  2. Patient will have a decrease in pain to facilitate improvement in movement, function, and ADLs as indicated by Functional Deficits. []? Progressing: []? Met: []? Not Met: []? Adjusted     Long Term Goals: To be achieved in: 12 weeks  1. Disability index score of 15% or less for the DASH to assist with reaching prior level of function. []? Progressing: []? Met: []? Not Met: []? Adjusted  2. Patient will demonstrate increased AROM to WNL's to allow for proper joint functioning as indicated by patients Functional Deficits. []? Progressing: []? Met: []? Not Met: []? Adjusted  3. Patient will demonstrate an increase in Strength to 5/5 to allow for proper functional mobility as indicated by patients Functional Deficits. []?  Progressing: []? Met: []? Not Met: []? Adjusted  4. Patient will return to all functional activities without increased symptoms or restriction. []? Progressing: []? Met: []? Not Met: []? Adjusted  5. Pt will be able to return to playing basketball without problems. (patient specific functional goal)    []? Progressing: []? Met: []? Not Met: []? Adjusted          Overall Progression Towards Functional goals/ Treatment Progress Update:  [] Patient is progressing as expected towards functional goals listed. [] Progression is slowed due to complexities/Impairments listed. [] Progression has been slowed due to co-morbidities. [x] Plan just implemented, too soon to assess goals progression <30days   [] Goals require adjustment due to lack of progress  [] Patient is not progressing as expected and requires additional follow up with physician  [] Other    ASSESSMENT: L shoulder shows weakness w/ some exercises vs R shoulder but good form w/ program. Pt does have some tenderness in infraspinatus muscle belly but not localized pain in tendon insertion site. Scapular retraction is still challenging for pt and repeated cuing given on tech. R shoulder does well w/ program. MD had offered injection to L shoulder on last visit. Recommended that pt contact MD for possible injection or oral medication to assist w/ inflammation in L shoulder for progression of program.     Treatment/Activity Tolerance:  [x] Patient tolerated treatment well [] Patient limited by fatique  [] Patient limited by pain  [] Patient limited by other medical complications  [] Other:     Prognosis: [x] Good [] Fair  [] Poor    Patient Requires Follow-up: [x] Yes  [] No    PLAN: Cont therapy for stability and control of shoulder. Recommend that pt continue therapy to improve strength and stability in shoulder.    [x] Continue per plan of care [] Alter current plan (see comments)  [x] Plan of care initiated [] Hold pending MD visit [] Discharge    Electronically signed by: Maryuri Jay, PT, MS, OMT-C      Physical Therapist [place] license #[id number]  Physical Therapist [place] license #[id number]     Note: If patient does not return for scheduled/ recommended follow up visits, this note will serve as a discharge from care along with most recent update on progress.

## 2022-05-09 ENCOUNTER — TREATMENT (OUTPATIENT)
Dept: PHYSICAL THERAPY | Age: 52
End: 2022-05-09
Payer: COMMERCIAL

## 2022-05-09 DIAGNOSIS — G89.29 CHRONIC RIGHT SHOULDER PAIN: Primary | ICD-10-CM

## 2022-05-09 DIAGNOSIS — M25.511 CHRONIC RIGHT SHOULDER PAIN: Primary | ICD-10-CM

## 2022-05-09 PROCEDURE — 97140 MANUAL THERAPY 1/> REGIONS: CPT | Performed by: PHYSICAL THERAPIST

## 2022-05-09 PROCEDURE — 97110 THERAPEUTIC EXERCISES: CPT | Performed by: PHYSICAL THERAPIST

## 2022-05-09 PROCEDURE — 97530 THERAPEUTIC ACTIVITIES: CPT | Performed by: PHYSICAL THERAPIST

## 2022-05-09 NOTE — PROGRESS NOTES
Fady Lozano [facility]   Phone: [phone]    Fax: [phone]      Physical Therapy Treatment Note/ Progress Report:           Date:  2022    Patient Name:  Reilly Renteria    :  1970  MRN: [id number]  Restrictions/Precautions:    Medical/Treatment Diagnosis Information:  · Diagnosis: M25.511 R shoulder pain chronicity  ·  Treating Diagnosis: limited ROM, pain in shoulder, dec strength in shoulder, dyskinesia scapula  Insurance/Certification information:  PT Insurance Information: Humana  Physician Information:  Referring Practitioner: Dr. Rosa Garrett  Has the plan of care been signed (Y/N):        []  Yes  [x]  No     Date of Patient follow up with Physician:       Is this a Progress Report:     [x]  Yes  []  No        If Yes:  Date Range for reporting period:  Beginning 22  Ending 22    Progress report will be due (10 Rx or 30 days whichever is less): 3/53/58      Recertification will be due (POC Duration  / 90 days whichever is less): 22        Visit # Insurance Allowable Auth Required   13 25 []  Yes []  No        Functional Scale: UEFI 68%    Date assessed:  3/14/2022  4/18/22 UEFI 76/80=95%      Latex Allergy:  [x]NO      []YES  Preferred Language for Healthcare:   [x]English       []other:    Pain level:  010/10 R shoulder w/o medication; 2-3/10 L shoulder at rest 5-6/10 w/ activity;     SUBJECTIVE:  Pt states that B shoulders are progressing w/ resolution of pain this weekend. Pt tried to schedule an appt w/ Dr. Rosa Garrett to get injection but had difficulty. He has appt for this Thursday. Past Medical History: HBP and cardiac condition of stent ; Covid 2021; hx of back fracture  Functional Disability Index: UEFI 68%      Pain Scale: 4-10/10  Easing factors: activity modification;   Provocative factors: shooting basketball; office work; reaching;     Type: [x]? Constant       []? Intermittent  [x]? Radiating     []? Localized     []? 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 155 (85!)     Abduction 180 175 155 deg! 160 (75!)     ER 90 90 T1 T1     IR 20 30! T9 T11!     Other             Other                    Strength L R Comment   Flexion 5 5 (2+!) 4/18/2022   Abduction 5 4+(2+!)     ER 5 4+     IR 5 4+     Supraspinatus         Upper Trap         Lower Trap         Mid Trap         Rhomboids         Biceps 5 5     Triceps 5 4+     Horizontal Abduction         Horizontal Adduction         Lats               Special Tests Results/Comment   Estefania Byrd positive   Speeds neg   OBriens     Other     Neurologic Signs     Functional Reach        Reflexes/Sensation:               [x]? Dermatomes/Myotomes intact               [x]? Reflexes equal and normal bilaterally              []?Other:     Joint mobility:               []?Normal                      [x]? Hypo end range for R shoulder;              []?Hyper     Palpation: tightness noted in pec, scapular muscles, infraspinatus, supraspinatus, levator and UT R>L;     Functional Mobility/Transfers: indep in transfers from all positions on eval;     Posture: kyphotic posture w/ rounded shoulders and FHP in upright position; ectomorph body structure;       Test measurements:      RESTRICTIONS/PRECAUTIONS: HBP and cardiac concerns w/ stent implanted 2019    Exercises/Interventions:   Exercises:  Exercise/Equipment Resistance/Repetitions Comments   Cardio/Warm-up     Bike          Stretching/PROM     Wand ER @90 10\"x10  Supine AAROM flex 10\"x10      Counter stretch in standing     Doorway stretch 10\"x10    Table Slides     UE [place]     Pulleys x4' warm up     CBA 10\"x10 3/16/22 Rolling over shoulder in side lying position        STRENGTH     Isometrics     Retraction          Weight shift     Flexion     Abduction     External Rotation     Internal Rotation     Biceps     Triceps          PRE's     Flexion      3# sitting 4x10 Both shoulders   Abduction 5# 3x10 side lying Both shoulders inc NPV        External Rotation Side lying 3# 3x10 Both shoulders   Internal Rotation     Shrugs     EXT     Reverse Flys     Serratus 12# 3x10         Horizontal Abd with ER     Biceps     Triceps     Retraction Prone 3# 3x10         Cable Column/Theraband     External Rotation Vs wall, no $ 3x10 blue    Internal Rotation     Shrugs     Lats Machine 105# 3x10    Ext     Flex     Scapular Retraction- horizontal      Prone 1# 3x10    Scapular retraction Machine 90# 3x15    BIC 12# 3x10    TRIC 60# 3x10    PNF D2          Neuromuscular Re-ed     Dynamic Stability     Ball vs wall     Diagonal V pattern     Dynamic ER vs wall 10              Ed given on diagnosis, expectations & goals X10'; 3/30/22 updated                   Manual/Modalities      IASTM using flyby x5'    PROM of B shoulder x10'           Therapeutic Exercise and NMR EXR  [x] (21206) Provided verbal/tactile cueing for activities related to strengthening, flexibility, endurance, ROM  for improvements in scapular, scapulothoracic and UE control with self care, reaching, carrying, lifting, house/yardwork, driving/computer work.    [] (56535) Provided verbal/tactile cueing for activities related to improving balance, coordination, kinesthetic sense, posture, motor skill, proprioception  to assist with  scapular, scapulothoracic and UE control with self care, reaching, carrying, lifting, house/yardwork, driving/computer work.     Therapeutic Activities:    [] (48907 or 55666) Provided verbal/tactile cueing for activities related to improving balance, coordination, kinesthetic sense, posture, motor skill, proprioception and motor activation to allow for proper function of scapular, scapulothoracic and UE control with self care, carrying, lifting, driving/computer work. Home Exercise Program:    [x] (90332) Reviewed/Progressed HEP activities related to strengthening, flexibility, endurance, ROM of scapular, scapulothoracic and UE control with self care, reaching, carrying, lifting, house/yardwork, driving/computer work     Written HEP     Access Code: [email or web address]  URL: [email or web address]  Date: 03/30/2022  Prepared by: Antony Humphrey    Exercises  Supine Shoulder Flexion Extension AAROM with Dowel - 2 x daily - 7 x weekly - 1 sets - 10 reps - 10 hold  Supine Cross Body Shoulder Stretch - 2 x daily - 7 x weekly - 1 sets - 10 reps - 10 hold  Supine Shoulder External Rotation with Dowel - 2 x daily - 7 x weekly - 1 sets - 10 reps - 10 hold  Sidelying Shoulder Abduction Palm Forward - 1 x daily - 7 x weekly - 3 sets - 10 reps  Sidelying Shoulder ER with Towel and Dumbbell - 1 x daily - 7 x weekly - 3 sets - 10 reps  Standing Shoulder Row with Anchored Resistance - 1 x daily - 7 x weekly - 3 sets - 10 reps  Standing Shoulder Horizontal Abduction with Resistance - 1 x daily - 7 x weekly - 3 sets - 10 reps  Push Up on Table - 1 x daily - 7 x weekly - 3 sets - 10 reps  Prone Scapular Retraction - 1 x daily - 7 x weekly - 2 sets - 10 reps    [] (18054) Reviewed/Progressed HEP activities related to improving balance, coordination, kinesthetic sense, posture, motor skill, proprioception of scapular, scapulothoracic and UE control with self care, reaching, carrying, lifting, house/yardwork, driving and computer work      Manual Treatments:  PROM / STM / Oscillations-Mobs:  G-I, II, III, IV (PA's, Inf., Post.)  [] (37548) Provided manual therapy to mobilize soft tissue/joints of cervical/CT, scapular GHJ and UE for the purpose of modulating pain, promoting relaxation,  increasing ROM, reducing/eliminating soft tissue swelling/inflammation/restriction, improving soft tissue extensibility and allowing for proper ROM for normal function with self care, reaching, carrying, lifting, house/yardwork, driving/computer work    Modalities: CP x10 min  To both shoulders   [] GAME READY (VASO)- for significant edema, swelling, pain control. Charges:  Timed Code Treatment Minutes: 55   Total Treatment Minutes: 65     [] EVAL (LOW) 48288 (typically 20 minutes face-to-face)  [] EVAL (MOD) 82007 (typically 30 minutes face-to-face)  [] EVAL (HIGH) 84219 (typically 45 minutes face-to-face)  [] RE-EVAL   [x] AI(16907)8 x 25'    [] IONTO  [] NMR (28710) x     [] VASO  [x] Manual (83936)1x 15'      [] Other:  [x] TA (67705)0 x 15'     [] Mech Traction (30450)  [] ES(attended) (20650)      [] ES (un) (63355):       GOALS:  Patient stated goal: use dominant UE w/o deviations or pain in shoulder. []? Progressing: []? Met: []? Not Met: []? Adjusted     Therapist goals for Patient:   Short Term Goals: To be achieved in: 2 weeks  1. Independent in HEP and progression per patient tolerance, in order to prevent re-injury. []? Progressing: []? Met: []? Not Met: []? Adjusted  2. Patient will have a decrease in pain to facilitate improvement in movement, function, and ADLs as indicated by Functional Deficits. []? Progressing: []? Met: []? Not Met: []? Adjusted     Long Term Goals: To be achieved in: 12 weeks  1. Disability index score of 15% or less for the DASH to assist with reaching prior level of function. []? Progressing: []? Met: []? Not Met: []? Adjusted  2. Patient will demonstrate increased AROM to WNL's to allow for proper joint functioning as indicated by patients Functional Deficits. []? Progressing: []? Met: []? Not Met: []? Adjusted  3.  Patient will demonstrate an increase in Strength to 5/5 to allow for proper functional mobility as indicated by patients Functional Deficits. []? Progressing: []? Met: []? Not Met: []? Adjusted  4. Patient will return to all functional activities without increased symptoms or restriction. []? Progressing: []? Met: []? Not Met: []? Adjusted  5. Pt will be able to return to playing basketball without problems. (patient specific functional goal)    []? Progressing: []? Met: []? Not Met: []? Adjusted          Overall Progression Towards Functional goals/ Treatment Progress Update:  [] Patient is progressing as expected towards functional goals listed. [] Progression is slowed due to complexities/Impairments listed. [] Progression has been slowed due to co-morbidities. [x] Plan just implemented, too soon to assess goals progression <30days   [] Goals require adjustment due to lack of progress  [] Patient is not progressing as expected and requires additional follow up with physician  [] Other    ASSESSMENT: Good tolerance to program today w/ fatigue only. Pt still noticing more fatigue in L UE - nondominant vs dominant UE. Quality of motion good w/ program. Pt more aware of posture during day and in clinic. Pt handled progression of resistance well today. Treatment/Activity Tolerance:  [x] Patient tolerated treatment well [] Patient limited by fatique  [] Patient limited by pain  [] Patient limited by other medical complications  [] Other:     Prognosis: [x] Good [] Fair  [] Poor    Patient Requires Follow-up: [x] Yes  [] No    PLAN: Cont therapy for stability and control of shoulder. Recommend that pt continue therapy to improve strength and stability in shoulder.    [x] Continue per plan of care [] Alter current plan (see comments)  [x] Plan of care initiated [] Hold pending MD visit [] Discharge    Electronically signed by: Maryuri Jay, PT, MS, OMT-C      Physical Therapist [place] license #[id number]  Physical Therapist [place] license #[id number]     Note: If patient does not return for scheduled/ recommended follow up visits, this note will serve as a discharge from care along with most recent update on progress.

## 2022-05-11 ENCOUNTER — TREATMENT (OUTPATIENT)
Dept: PHYSICAL THERAPY | Age: 52
End: 2022-05-11
Payer: COMMERCIAL

## 2022-05-11 DIAGNOSIS — M25.511 CHRONIC RIGHT SHOULDER PAIN: Primary | ICD-10-CM

## 2022-05-11 DIAGNOSIS — G89.29 CHRONIC RIGHT SHOULDER PAIN: Primary | ICD-10-CM

## 2022-05-11 PROCEDURE — 97530 THERAPEUTIC ACTIVITIES: CPT | Performed by: PHYSICAL THERAPIST

## 2022-05-11 PROCEDURE — 97110 THERAPEUTIC EXERCISES: CPT | Performed by: PHYSICAL THERAPIST

## 2022-05-11 PROCEDURE — 97140 MANUAL THERAPY 1/> REGIONS: CPT | Performed by: PHYSICAL THERAPIST

## 2022-05-11 NOTE — PROGRESS NOTES
[facility]   Phone: [phone]    Fax: [phone]      Physical Therapy Treatment Note/ Progress Report:           Date:  2022    Patient Name:  Harris Jesus    :  1970  MRN: [id number]  Restrictions/Precautions:    Medical/Treatment Diagnosis Information:  · Diagnosis: M25.511 R shoulder pain chronicity  ·  Treating Diagnosis: limited ROM, pain in shoulder, dec strength in shoulder, dyskinesia scapula  Insurance/Certification information:  PT Insurance Information: [employer]  Physician Information:  Referring Practitioner: Dr. Shola Sellers  Has the plan of care been signed (Y/N):        []  Yes  [x]  No     Date of Patient follow up with Physician:       Is this a Progress Report:     [x]  Yes  []  No        If Yes:  Date Range for reporting period:  Beginning 22  Ending 22    Progress report will be due (10 Rx or 30 days whichever is less): 18      Recertification will be due (POC Duration  / 90 days whichever is less): 22        Visit # Insurance Allowable Auth Required   13 25 []  Yes []  No        Functional Scale: UEFI 68%    Date assessed:  3/14/2022  4/18/22 UEFI 76/80=95%      Latex Allergy:  [x]NO      []YES  Preferred Language for Healthcare:   [x]English       []other:    Pain level:  010/10 R shoulder w/o medication; 2-3/10 L shoulder at rest 5-6/10 w/ activity;     SUBJECTIVE:  Pt states that he still has some pain in superior L shoulder region along acromion but has not changed. Pt is scheduled for injection tomorrow w/ Dr. Shola Sellers for L shoulder. R shoulder is doing well. Past Medical History: HBP and cardiac condition of stent ; Covid 2021; hx of back fracture  Functional Disability Index: UEFI 68%      Pain Scale: 4-10/10  Easing factors: activity modification;   Provocative factors: shooting basketball; office work; reaching;     Type: [x]? Constant       []? Intermittent  [x]? Radiating     []? 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 155 (85!)     Abduction 180 175 155 deg! 160 (75!)     ER 90 90 T1 T1     IR 20 30! T9 T11!     Other             Other                    Strength L R Comment   Flexion 5 5 (2+!) 4/18/2022   Abduction 5 4+(2+!)     ER 5 4+     IR 5 4+     Supraspinatus         Upper Trap         Lower Trap         Mid Trap         Rhomboids         Biceps 5 5     Triceps 5 4+     Horizontal Abduction         Horizontal Adduction         Lats               Special Tests Results/Comment   Estefania Byrd positive   Speeds neg   OBriens     Other     Neurologic Signs     Functional Reach        Reflexes/Sensation:               [x]? Dermatomes/Myotomes intact               [x]? Reflexes equal and normal bilaterally              []?Other:     Joint mobility:               []?Normal                      [x]? Hypo end range for R shoulder;              []?Hyper     Palpation: tightness noted in pec, scapular muscles, infraspinatus, supraspinatus, levator and UT R>L;     Functional Mobility/Transfers: indep in transfers from all positions on eval;     Posture: kyphotic posture w/ rounded shoulders and FHP in upright position; ectomorph body structure;       Test measurements:      RESTRICTIONS/PRECAUTIONS: HBP and cardiac concerns w/ stent implanted 2019    Exercises/Interventions:   Exercises:  Exercise/Equipment Resistance/Repetitions Comments   Cardio/Warm-up     Bike Stretching/PROM     Wand ER @90 10\"x10  Supine AAROM flex 10\"x10      Counter stretch in standing     Doorway stretch 10\"x10    Table Slides     UE [place]     Pulleys x4' warm up     CBA 10\"x10 Rolling over shoulder in side lying position        STRENGTH     Isometrics     Retraction          Weight shift     Flexion     Abduction     External Rotation     Internal Rotation     Biceps     Triceps          PRE's     Flexion      3# sitting 4x10 Both shoulders   Abduction 7# 3x10 side lying Both shoulders         External Rotation Side lying 3# 3x15 Both shoulders   Internal Rotation     Shrugs     EXT     Reverse Flys     Serratus 12# 3x10         Horizontal Abd with ER     Biceps     Triceps     Retraction Prone 3# 3x10         Cable Column/Theraband     External Rotation Vs wall, no $ 3x10 blue    Internal Rotation     Shrugs     Lats Machine 105# 3x10    Ext     Flex     Scapular Retraction- horizontal      Prone 1# 3x10    Scapular retraction Machine 90# 3x15    BIC 12# 3x10    TRIC 60# 3x10    PNF D2          Neuromuscular Re-ed     Dynamic Stability     Ball vs wall     Diagonal V pattern     Dynamic ER vs wall 10              Ed given on diagnosis, expectations & goals X10'; 3/30/22 updated                   Manual/Modalities      IASTM using flyby x5'    PROM of B shoulder x10'           Therapeutic Exercise and NMR EXR  [x] (50235) Provided verbal/tactile cueing for activities related to strengthening, flexibility, endurance, ROM  for improvements in scapular, scapulothoracic and UE control with self care, reaching, carrying, lifting, house/yardwork, driving/computer work.    [] (17727) Provided verbal/tactile cueing for activities related to improving balance, coordination, kinesthetic sense, posture, motor skill, proprioception  to assist with  scapular, scapulothoracic and UE control with self care, reaching, carrying, lifting, house/yardwork, driving/computer work.     Therapeutic Activities:    [] (23315 or [id number]) Provided verbal/tactile cueing for activities related to improving balance, coordination, kinesthetic sense, posture, motor skill, proprioception and motor activation to allow for proper function of scapular, scapulothoracic and UE control with self care, carrying, lifting, driving/computer work. Home Exercise Program:    [x] (85274) Reviewed/Progressed HEP activities related to strengthening, flexibility, endurance, ROM of scapular, scapulothoracic and UE control with self care, reaching, carrying, lifting, house/yardwork, driving/computer work     Written HEP     Access Code: [email or web address]  URL: [email or web address]  Date: 03/30/2022  Prepared by: Denny Jacques    Exercises  Supine Shoulder Flexion Extension AAROM with Dowel - 2 x daily - 7 x weekly - 1 sets - 10 reps - 10 hold  Supine Cross Body Shoulder Stretch - 2 x daily - 7 x weekly - 1 sets - 10 reps - 10 hold  Supine Shoulder External Rotation with Dowel - 2 x daily - 7 x weekly - 1 sets - 10 reps - 10 hold  Sidelying Shoulder Abduction Palm Forward - 1 x daily - 7 x weekly - 3 sets - 10 reps  Sidelying Shoulder ER with Towel and Dumbbell - 1 x daily - 7 x weekly - 3 sets - 10 reps  Standing Shoulder Row with Anchored Resistance - 1 x daily - 7 x weekly - 3 sets - 10 reps  Standing Shoulder Horizontal Abduction with Resistance - 1 x daily - 7 x weekly - 3 sets - 10 reps  Push Up on Table - 1 x daily - 7 x weekly - 3 sets - 10 reps  Prone Scapular Retraction - 1 x daily - 7 x weekly - 2 sets - 10 reps    [] (01363) Reviewed/Progressed HEP activities related to improving balance, coordination, kinesthetic sense, posture, motor skill, proprioception of scapular, scapulothoracic and UE control with self care, reaching, carrying, lifting, house/yardwork, driving and computer work      Manual Treatments:  PROM / STM / Oscillations-Mobs:  G-I, II, III, IV (PA's, Inf., Post.)  [] (59684) Provided manual therapy to mobilize soft tissue/joints of cervical/CT, scapular GHJ and UE for the purpose of modulating pain, promoting relaxation,  increasing ROM, reducing/eliminating soft tissue swelling/inflammation/restriction, improving soft tissue extensibility and allowing for proper ROM for normal function with self care, reaching, carrying, lifting, house/yardwork, driving/computer work    Modalities: CP x10 min  To both shoulders   [] GAME READY (VASO)- for significant edema, swelling, pain control. Charges:  Timed Code Treatment Minutes: 55   Total Treatment Minutes: 65     [] EVAL (LOW) 40565 (typically 20 minutes face-to-face)  [] EVAL (MOD) 42649 (typically 30 minutes face-to-face)  [] EVAL (HIGH) 53557 (typically 45 minutes face-to-face)  [] RE-EVAL   [x] NX(41230)0 x 25'    [] IONTO  [] NMR (75398) x     [] VASO  [x] Manual (87104)1x 15'      [] Other:  [x] TA (21139)9 x 15'     [] Mech Traction (68311)  [] ES(attended) (30197)      [] ES (un) (93702):       GOALS:  Patient stated goal: use dominant UE w/o deviations or pain in shoulder. []? Progressing: []? Met: []? Not Met: []? Adjusted     Therapist goals for Patient:   Short Term Goals: To be achieved in: 2 weeks  1. Independent in HEP and progression per patient tolerance, in order to prevent re-injury. []? Progressing: []? Met: []? Not Met: []? Adjusted  2. Patient will have a decrease in pain to facilitate improvement in movement, function, and ADLs as indicated by Functional Deficits. []? Progressing: []? Met: []? Not Met: []? Adjusted     Long Term Goals: To be achieved in: 12 weeks  1. Disability index score of 15% or less for the DASH to assist with reaching prior level of function. []? Progressing: []? Met: []? Not Met: []? Adjusted  2. Patient will demonstrate increased AROM to WNL's to allow for proper joint functioning as indicated by patients Functional Deficits. []? Progressing: []? Met: []? Not Met: []? Adjusted  3.  Patient will demonstrate an increase in Strength to 5/5 to allow for proper functional mobility as indicated by patients Functional Deficits. []? Progressing: []? Met: []? Not Met: []? Adjusted  4. Patient will return to all functional activities without increased symptoms or restriction. []? Progressing: []? Met: []? Not Met: []? Adjusted  5. Pt will be able to return to playing basketball without problems. (patient specific functional goal)    []? Progressing: []? Met: []? Not Met: []? Adjusted          Overall Progression Towards Functional goals/ Treatment Progress Update:  [] Patient is progressing as expected towards functional goals listed. [] Progression is slowed due to complexities/Impairments listed. [] Progression has been slowed due to co-morbidities. [x] Plan just implemented, too soon to assess goals progression <30days   [] Goals require adjustment due to lack of progress  [] Patient is not progressing as expected and requires additional follow up with physician  [] Other    ASSESSMENT: Pt fatigued quickly from program despite min inc in resistance today. Pt needed more rest breaks but able to perform exercises correctly. No adverse effects from therapy in either shoulder. Scapular retraction improving w/ awareness but fatigues easily. Treatment/Activity Tolerance:  [x] Patient tolerated treatment well [] Patient limited by fatique  [] Patient limited by pain  [] Patient limited by other medical complications  [] Other:     Prognosis: [x] Good [] Fair  [] Poor    Patient Requires Follow-up: [x] Yes  [] No    PLAN: Cont therapy for stability and control of shoulder. Recommend that pt continue therapy to improve strength and stability in shoulder.    [x] Continue per plan of care [] Alter current plan (see comments)  [x] Plan of care initiated [] Hold pending MD visit [] Discharge    Electronically signed by: Love Singh, PT, MS, OMT-C      Physical Therapist [place] license #[id number]  Physical Therapist [place] license #[id number]     Note: If patient does not return for scheduled/ recommended follow up visits, this note will serve as a discharge from care along with most recent update on progress.

## 2022-05-12 ENCOUNTER — OFFICE VISIT (OUTPATIENT)
Dept: ORTHOPEDIC SURGERY | Age: 52
End: 2022-05-12
Payer: COMMERCIAL

## 2022-05-12 VITALS — HEIGHT: 69 IN | BODY MASS INDEX: 35.84 KG/M2 | WEIGHT: 242 LBS

## 2022-05-12 DIAGNOSIS — M75.42 IMPINGEMENT SYNDROME OF LEFT SHOULDER: ICD-10-CM

## 2022-05-12 DIAGNOSIS — M25.512 ACUTE PAIN OF LEFT SHOULDER: Primary | ICD-10-CM

## 2022-05-12 PROCEDURE — 20610 DRAIN/INJ JOINT/BURSA W/O US: CPT | Performed by: ORTHOPAEDIC SURGERY

## 2022-05-12 PROCEDURE — 99213 OFFICE O/P EST LOW 20 MIN: CPT | Performed by: ORTHOPAEDIC SURGERY

## 2022-05-12 RX ORDER — LIDOCAINE HYDROCHLORIDE 10 MG/ML
4 INJECTION, SOLUTION INFILTRATION; PERINEURAL ONCE
Status: CANCELLED | OUTPATIENT
Start: 2022-05-12 | End: 2022-05-12

## 2022-05-12 RX ORDER — METHYLPREDNISOLONE ACETATE 40 MG/ML
80 INJECTION, SUSPENSION INTRA-ARTICULAR; INTRALESIONAL; INTRAMUSCULAR; SOFT TISSUE ONCE
Status: CANCELLED | OUTPATIENT
Start: 2022-05-12 | End: 2022-05-12

## 2022-05-16 ENCOUNTER — TREATMENT (OUTPATIENT)
Dept: PHYSICAL THERAPY | Age: 52
End: 2022-05-16
Payer: COMMERCIAL

## 2022-05-16 DIAGNOSIS — M25.511 CHRONIC RIGHT SHOULDER PAIN: Primary | ICD-10-CM

## 2022-05-16 DIAGNOSIS — G89.29 CHRONIC RIGHT SHOULDER PAIN: Primary | ICD-10-CM

## 2022-05-16 PROCEDURE — 97530 THERAPEUTIC ACTIVITIES: CPT | Performed by: PHYSICAL THERAPIST

## 2022-05-16 PROCEDURE — 97110 THERAPEUTIC EXERCISES: CPT | Performed by: PHYSICAL THERAPIST

## 2022-05-16 PROCEDURE — 97140 MANUAL THERAPY 1/> REGIONS: CPT | Performed by: PHYSICAL THERAPIST

## 2022-05-16 NOTE — PROGRESS NOTES
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 155 (85!)     Abduction 180 175 155 deg! 160 (75!)     ER 90 90 T1 T1     IR 20 30! T9 T11!     Other             Other                    Strength L R Comment   Flexion 5 5 (2+!) 4/18/2022   Abduction 5 4+(2+!)     ER 5 4+     IR 5 4+     Supraspinatus         Upper Trap         Lower Trap         Mid Trap         Rhomboids         Biceps 5 5     Triceps 5 4+     Horizontal Abduction         Horizontal Adduction         Lats               Special Tests Results/Comment   Estefania Byrd positive   Speeds neg   OBriens     Other     Neurologic Signs     Functional Reach        Reflexes/Sensation:               [x]? Dermatomes/Myotomes intact               [x]? Reflexes equal and normal bilaterally              []?Other:     Joint mobility:               []?Normal                      [x]? Hypo end range for R shoulder;              []?Hyper     Palpation: tightness noted in pec, scapular muscles, infraspinatus, supraspinatus, levator and UT R>L;     Functional Mobility/Transfers: indep in transfers from all positions on eval;     Posture: kyphotic posture w/ rounded shoulders and FHP in upright position; ectomorph body structure;       Test measurements:      RESTRICTIONS/PRECAUTIONS: HBP and cardiac concerns w/ stent implanted 2019    Exercises/Interventions:   Exercises:  Exercise/Equipment Resistance/Repetitions Comments   Cardio/Warm-up     Bike          Stretching/PROM     Wand ER @90 10\"x10  Supine AAROM flex 10\"x10      Counter stretch in standing     Doorway stretch 10\"x10    Table Slides     UE [place]     Pulleys x4' warm up     CBA 10\"x10 Rolling over shoulder in side lying position        STRENGTH     Isometrics     Retraction          Weight shift     Flexion     Abduction     External Rotation     Internal Rotation     Biceps     Triceps          PRE's     Flexion      3# sitting 4x10 Both shoulders   Abduction 7# 3x10 side lying Both shoulders         External Rotation Side lying 3# 3x15 Both shoulders   Internal Rotation     Shrugs     EXT     Reverse Flys     Serratus 12# 3x10         Horizontal Abd with ER     Biceps     Triceps     Retraction Prone 3# 3x10         Cable Column/Theraband     External Rotation     Internal Rotation     Shrugs     Lats Machine 105# 3x10    Ext     Flex     Scapular Retraction- horizontal      Prone 1# 3x10    Superman  3x10    Scapular retraction Machine 105# 3x10    BIC 12# 3x10    TRIC 60# 3x10    PNF D2          Neuromuscular Re-ed     Dynamic Stability     Ball vs wall     Diagonal V pattern     Dynamic ER vs wall 10 blue TB              Ed given on diagnosis, expectations & goals X10'; 3/30/22 updated                   Manual/Modalities      IASTM using flyby x5'    PROM of B shoulder x10'           Therapeutic Exercise and NMR EXR  [x] (00497) Provided verbal/tactile cueing for activities related to strengthening, flexibility, endurance, ROM  for improvements in scapular, scapulothoracic and UE control with self care, reaching, carrying, lifting, house/yardwork, driving/computer work.    [] (81750) Provided verbal/tactile cueing for activities related to improving balance, coordination, kinesthetic sense, posture, motor skill, proprioception  to assist with  scapular, scapulothoracic and UE control with self care, reaching, carrying, lifting, house/yardwork, driving/computer work.     Therapeutic Activities:    [] (13750 or 03055) Provided verbal/tactile cueing for activities related to improving balance, coordination, kinesthetic sense, posture, motor skill, proprioception and motor activation to allow for proper function of scapular, scapulothoracic and UE control with self care, carrying, lifting, driving/computer work. Home Exercise Program:    [x] (48563) Reviewed/Progressed HEP activities related to strengthening, flexibility, endurance, ROM of scapular, scapulothoracic and UE control with self care, reaching, carrying, lifting, house/yardwork, driving/computer work     Written HEP     Access Code: [email or web address]  URL: [email or web address]  Date: 03/30/2022  Prepared by: Maryuri Jay    Exercises  Supine Shoulder Flexion Extension AAROM with Dowel - 2 x daily - 7 x weekly - 1 sets - 10 reps - 10 hold  Supine Cross Body Shoulder Stretch - 2 x daily - 7 x weekly - 1 sets - 10 reps - 10 hold  Supine Shoulder External Rotation with Dowel - 2 x daily - 7 x weekly - 1 sets - 10 reps - 10 hold  Sidelying Shoulder Abduction Palm Forward - 1 x daily - 7 x weekly - 3 sets - 10 reps  Sidelying Shoulder ER with Towel and Dumbbell - 1 x daily - 7 x weekly - 3 sets - 10 reps  Standing Shoulder Row with Anchored Resistance - 1 x daily - 7 x weekly - 3 sets - 10 reps  Standing Shoulder Horizontal Abduction with Resistance - 1 x daily - 7 x weekly - 3 sets - 10 reps  Push Up on Table - 1 x daily - 7 x weekly - 3 sets - 10 reps  Prone Scapular Retraction - 1 x daily - 7 x weekly - 2 sets - 10 reps    [] (56972) Reviewed/Progressed HEP activities related to improving balance, coordination, kinesthetic sense, posture, motor skill, proprioception of scapular, scapulothoracic and UE control with self care, reaching, carrying, lifting, house/yardwork, driving and computer work      Manual Treatments:  PROM / STM / Oscillations-Mobs:  G-I, II, III, IV (PA's, Inf., Post.)  [] (57702) Provided manual therapy to mobilize soft tissue/joints of cervical/CT, scapular GHJ and UE for the purpose of modulating pain, promoting relaxation,  increasing ROM, reducing/eliminating soft tissue swelling/inflammation/restriction, improving soft tissue extensibility and allowing for proper ROM for normal function with self care, reaching, carrying, lifting, house/yardwork, driving/computer work    Modalities: CP x10 min  To both shoulders   [] GAME READY (VASO)- for significant edema, swelling, pain control. Charges:  Timed Code Treatment Minutes: 55   Total Treatment Minutes: 65     [] EVAL (LOW) 10684 (typically 20 minutes face-to-face)  [] EVAL (MOD) 81191 (typically 30 minutes face-to-face)  [] EVAL (HIGH) 54394 (typically 45 minutes face-to-face)  [] RE-EVAL   [x] DE(59871)6 x 25'    [] IONTO  [] NMR (40808) x     [] VASO  [x] Manual (72249)1x 15'      [] Other:  [x] TA (55228)5 x 15'     [] Mech Traction (94763)  [] ES(attended) (34689)      [] ES (un) (49509):       GOALS:  Patient stated goal: use dominant UE w/o deviations or pain in shoulder. []? Progressing: []? Met: []? Not Met: []? Adjusted     Therapist goals for Patient:   Short Term Goals: To be achieved in: 2 weeks  1. Independent in HEP and progression per patient tolerance, in order to prevent re-injury. []? Progressing: []? Met: []? Not Met: []? Adjusted  2. Patient will have a decrease in pain to facilitate improvement in movement, function, and ADLs as indicated by Functional Deficits. []? Progressing: []? Met: []? Not Met: []? Adjusted     Long Term Goals: To be achieved in: 12 weeks  1. Disability index score of 15% or less for the DASH to assist with reaching prior level of function. []? Progressing: []? Met: []? Not Met: []? Adjusted  2. Patient will demonstrate increased AROM to WNL's to allow for proper joint functioning as indicated by patients Functional Deficits. []? Progressing: []? Met: []? Not Met: []? Adjusted  3.  Patient will demonstrate an increase in Strength to 5/5 to allow for proper functional mobility as indicated by patients Functional Deficits. []? Progressing: []? Met: []? Not Met: []? Adjusted  4. Patient will return to all functional activities without increased symptoms or restriction. []? Progressing: []? Met: []? Not Met: []? Adjusted  5. Pt will be able to return to playing basketball without problems. (patient specific functional goal)    []? Progressing: []? Met: []? Not Met: []? Adjusted          Overall Progression Towards Functional goals/ Treatment Progress Update:  [] Patient is progressing as expected towards functional goals listed. [] Progression is slowed due to complexities/Impairments listed. [] Progression has been slowed due to co-morbidities. [x] Plan just implemented, too soon to assess goals progression <30days   [] Goals require adjustment due to lack of progress  [] Patient is not progressing as expected and requires additional follow up with physician  [] Other    ASSESSMENT: Pt fatigued quickly from program despite min inc in resistance today that pt relayed to extra yard work yesterday. Pt needs concentration for correct upper back contraction prior to lifting shoulders. Believe that pt's old back injury may be complicating his shoulder program. Attempted ITY's but pt struggled too much with this exercise to perform correctly so returned to single direction motion.                                                                                                                                                                                                                                                                                                                                                        Treatment/Activity Tolerance:  [x] Patient tolerated treatment well [] Patient limited by fatique  [] Patient limited by pain  [] Patient limited by other medical complications  [] Other:     Prognosis: [x] Good [] Fair  [] Poor    Patient Requires Follow-up: [x] Yes  [] No    PLAN: Cont therapy for stability and control of shoulder. Recommend that pt continue therapy to improve strength and stability in shoulder. [x] Continue per plan of care [] Alter current plan (see comments)  [x] Plan of care initiated [] Hold pending MD visit [] Discharge    Electronically signed by: Alla Terrazas, PT, MS, OMT-C      Physical Therapist [place] license #[id number]  Physical Therapist [place] license #[id number]     Note: If patient does not return for scheduled/ recommended follow up visits, this note will serve as a discharge from care along with most recent update on progress.

## 2022-05-18 ENCOUNTER — TREATMENT (OUTPATIENT)
Dept: PHYSICAL THERAPY | Age: 52
End: 2022-05-18
Payer: COMMERCIAL

## 2022-05-18 DIAGNOSIS — G89.29 CHRONIC RIGHT SHOULDER PAIN: Primary | ICD-10-CM

## 2022-05-18 DIAGNOSIS — M25.511 CHRONIC RIGHT SHOULDER PAIN: Primary | ICD-10-CM

## 2022-05-18 PROCEDURE — 97110 THERAPEUTIC EXERCISES: CPT | Performed by: PHYSICAL THERAPIST

## 2022-05-18 PROCEDURE — 97140 MANUAL THERAPY 1/> REGIONS: CPT | Performed by: PHYSICAL THERAPIST

## 2022-05-18 PROCEDURE — 97530 THERAPEUTIC ACTIVITIES: CPT | Performed by: PHYSICAL THERAPIST

## 2022-05-18 NOTE — PROGRESS NOTES
[facility]   Phone: [phone]    Fax: [phone]      Physical Therapy Treatment Note/ Progress Report:   Physical Therapy Re-Certification Plan of Care    Dear  Dr. Cristian Platt,    We had the pleasure of treating the following patient for physical therapy services at [address] [place] [address]. A summary of our findings can be found in the updated assessment below. This includes our plan of care. If you have any questions or concerns regarding these findings, please do not hesitate to contact me at the office phone number checked above. Thank you for the referral.     Physician Signature:________________________________Date:__________________  By signing above (or electronic signature), therapists plan is approved by physician      Overall Response to Treatment:   [x]Patient is responding well to treatment and improvement is noted with regards  to goals   []Patient should continue to improve in reasonable time if they continue HEP   []Patient has plateaued and is no longer responding to skilled PT intervention    []Patient is getting worse and would benefit from return to referring MD   []Patient unable to adhere to initial POC   []Other: Pt is progressing w/ R shoulder symptoms resolving and L shoulder has reduced since injection last week. Pt has improved motion in shoulders to WNL. Strength and scapular awareness progressing but still fatigues quickly. Pt is progressing well toward goals. Pt will be transitioned to HEP over the next few weeks.            Date:  2022    Patient Name:  Isabel Wilkerson    :  1970  MRN: [id number]  Restrictions/Precautions:    Medical/Treatment Diagnosis Information:  · Diagnosis: M25.511 R shoulder pain chronicity  ·  Treating Diagnosis: limited ROM, pain in shoulder, dec strength in shoulder, dyskinesia scapula  Insurance/Certification information:  PT Insurance Information: [employer]  Physician Information: Referring Practitioner: Dr. Nelly Cordova  Has the plan of care been signed (Y/N):        []  Yes  [x]  No     Date of Patient follow up with Physician: not scheduled      Is this a Progress Report:     [x]  Yes  []  No        If Yes:  Date Range for reporting period:  Beginning 4/14/22- update NPV  Ending 5/14/22    Progress report will be due (10 Rx or 30 days whichever is less): 3/81/77      Recertification will be due (POC Duration  / 90 days whichever is less): 6/14/22        Visit # Insurance Allowable Auth Required   15 25 []  Yes []  No        Functional Scale: UEFI 68%    Date assessed:  3/14/2022  4/18/22 UEFI 76/80=95%  5/18/22 UEFI       Latex Allergy:  [x]NO      []YES  Preferred Language for Healthcare:   [x]English       []other:    Pain level:  010/10 R shoulder w/o medication; 2-3/10 L shoulder at rest 5-6/10 w/ activity;     SUBJECTIVE:  Pt states that R shoulder is feeling good w/ min to no symptoms. L shoulder has some stiffness noted and pain int w/ fatigue noted w/ activities. Icing L shoulder but no longer needing int on R shoulder. Past Medical History: HBP and cardiac condition of stent 2019; Covid 12/2021; hx of back fracture     Pain Scale: 0/10 R shoulder; 1-3/10 int in L shoulder;  Easing factors: activity modification;   Provocative factors: shooting basketball; office work; reaching;     Type: [x]? Constant       []? Intermittent  [x]? Radiating     []? Localized     []? 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; walking, lifting wts, basketball;      Living Status/Prior Level of Function: Independent with ADLs and IADLs, min limitations intermittently prior to Jan but now constant;     OBJECTIVE: See eval   Observation:   Neck ROM: FB WNL NE, BB 75% NE, rotation WNL's NE, SB 75% NE;             ROM PROM AROM  Comment     L R L R 5/18/2022   Flexion 180 180 160 deg 165 (85!)     Abduction 180 180 155 deg 160 (75!)     ER 90 90 T1 T1     IR 40 40 T9 T10     Other             Other                    Strength L R Comment   Flexion 5 5 (2+!) 5/18/2022   Abduction 5 4+(2+!)     ER 5 4+     IR 5 4+     Supraspinatus         Upper Trap         Lower Trap         Mid Trap         Rhomboids         Biceps 5 5     Triceps 5 4+     Horizontal Abduction         Horizontal Adduction         Lats               Special Tests Results/Comment   Cohen-Ander     Neers positive   Speeds neg   OBriens     Other     Neurologic Signs     Functional Reach        Reflexes/Sensation:               [x]? Dermatomes/Myotomes intact               [x]? Reflexes equal and normal bilaterally              []?Other:     Joint mobility:               []?Normal                      [x]? Hypo end range for R shoulder;              []?Hyper     Palpation: tightness noted in pec, scapular muscles, infraspinatus, supraspinatus, levator and UT R>L;     Functional Mobility/Transfers: indep in transfers from all positions on eval;     Posture: kyphotic posture w/ rounded shoulders and FHP in upright position; ectomorph body structure;       Test measurements:      RESTRICTIONS/PRECAUTIONS: HBP and cardiac concerns w/ stent implanted 2019    Exercises/Interventions:   Exercises:  Exercise/Equipment Resistance/Repetitions Comments   Cardio/Warm-up     Bike          Stretching/PROM     Wand ER @90 10\"x10  Supine AAROM flex 10\"x10      Counter stretch in standing     Doorway stretch 10\"x10    Table Slides     UE [place]     Pulleys x4' warm up     CBA 10\"x10 Rolling over shoulder in side lying position        STRENGTH     Isometrics     Retraction          Weight shift     Flexion     Abduction     External Rotation     Internal Rotation     Biceps     Triceps          PRE's     Flexion       Both shoulders   Abduction 7# 3x10 side lying Both shoulders         External Rotation Side lying 4# 3x10 Both shoulders   Internal Rotation     Shrugs     EXT     Reverse Flys     Serratus 12# 3x10         Horizontal Abd with ER     Biceps     Triceps     Retraction Prone 3# 3x10         Cable Column/Theraband     External Rotation     Internal Rotation     Shrugs     Lats Machine 120# 3x10    Ext     Flex     Scapular Retraction- horizontal      Prone 3# 3x10    Superman      Scapular retraction Machine 105# 3x10    BIC 12# 3x10    TRIC 60# 3x10    PNF D2 1# in supine x20         Neuromuscular Re-ed     Dynamic Stability     Ball vs wall     Diagonal V pattern     Dynamic ER vs wall 10 blue TB    Ball toss vs MT Side bilat, chest and overhead x15-20 reps ea         Ed given on diagnosis, expectations & goals X10'; 5/18/22 reviewed                   Manual/Modalities      IASTM using flyby x5'    PROM of B shoulder x10'           Therapeutic Exercise and NMR EXR  [x] (19350) Provided verbal/tactile cueing for activities related to strengthening, flexibility, endurance, ROM  for improvements in scapular, scapulothoracic and UE control with self care, reaching, carrying, lifting, house/yardwork, driving/computer work.    [] (58569) Provided verbal/tactile cueing for activities related to improving balance, coordination, kinesthetic sense, posture, motor skill, proprioception  to assist with  scapular, scapulothoracic and UE control with self care, reaching, carrying, lifting, house/yardwork, driving/computer work. Therapeutic Activities:    [] (08137 or 63894) Provided verbal/tactile cueing for activities related to improving balance, coordination, kinesthetic sense, posture, motor skill, proprioception and motor activation to allow for proper function of scapular, scapulothoracic and UE control with self care, carrying, lifting, driving/computer work.      Home Exercise Program:    [x] (81936) Reviewed/Progressed HEP activities related to strengthening, flexibility, endurance, ROM of scapular, scapulothoracic and UE control with self care, reaching, carrying, lifting, house/yardwork, driving/computer work     Written HEP     Access Code: [email or web address]  URL: [email or web address]  Date: 03/30/2022  Prepared by: Zaynab Cortes    Exercises  Supine Shoulder Flexion Extension AAROM with Dowel - 2 x daily - 7 x weekly - 1 sets - 10 reps - 10 hold  Supine Cross Body Shoulder Stretch - 2 x daily - 7 x weekly - 1 sets - 10 reps - 10 hold  Supine Shoulder External Rotation with Dowel - 2 x daily - 7 x weekly - 1 sets - 10 reps - 10 hold  Sidelying Shoulder Abduction Palm Forward - 1 x daily - 7 x weekly - 3 sets - 10 reps  Sidelying Shoulder ER with Towel and Dumbbell - 1 x daily - 7 x weekly - 3 sets - 10 reps  Standing Shoulder Row with Anchored Resistance - 1 x daily - 7 x weekly - 3 sets - 10 reps  Standing Shoulder Horizontal Abduction with Resistance - 1 x daily - 7 x weekly - 3 sets - 10 reps  Push Up on Table - 1 x daily - 7 x weekly - 3 sets - 10 reps  Prone Scapular Retraction - 1 x daily - 7 x weekly - 2 sets - 10 reps    [] (85268) Reviewed/Progressed HEP activities related to improving balance, coordination, kinesthetic sense, posture, motor skill, proprioception of scapular, scapulothoracic and UE control with self care, reaching, carrying, lifting, house/yardwork, driving and computer work      Manual Treatments:  PROM / STM / Oscillations-Mobs:  G-I, II, III, IV (PA's, Inf., Post.)  [] ([address] [facility]) Provided manual therapy to mobilize soft tissue/joints of cervical/CT, scapular GHJ and UE for the purpose of modulating pain, promoting relaxation,  increasing ROM, reducing/eliminating soft tissue swelling/inflammation/restriction, improving soft tissue extensibility and allowing for proper ROM for normal function with self care, reaching, carrying, lifting, house/yardwork, driving/computer work    Modalities: CP x10 min  To both shoulders   [] GAME READY (VASO)- for significant edema, swelling, pain control.        Charges:  Timed Code Treatment Minutes: 55   Total Treatment Minutes: 65     [] EVAL (LOW) 65500 (typically 20 minutes face-to-face)  [] EVAL (MOD) 31623 (typically 30 minutes face-to-face)  [] EVAL (HIGH) 72979 (typically 45 minutes face-to-face)  [] RE-EVAL   [x] JQ(35608)0 x 25'    [] IONTO  [] NMR (92610) x     [] VASO  [x] Manual (29395)1x 15'      [] Other:  [x] TA (11616)5 x 15'     [] Mech Traction (59720)  [] ES(attended) (50050)      [] ES (un) (61571):       GOALS:  Patient stated goal: use dominant UE w/o deviations or pain in shoulder. []? Progressing: []? Met: []? Not Met: []? Adjusted     Therapist goals for Patient:   Short Term Goals: To be achieved in: 2 weeks  1. Independent in HEP and progression per patient tolerance, in order to prevent re-injury. []? Progressing: []? Met: []? Not Met: []? Adjusted  2. Patient will have a decrease in pain to facilitate improvement in movement, function, and ADLs as indicated by Functional Deficits. []? Progressing: []? Met: []? Not Met: []? Adjusted     Long Term Goals: To be achieved in: 12 weeks  1. Disability index score of 15% or less for the DASH to assist with reaching prior level of function. [x]? Progressing: []? Met: []? Not Met: []? Adjusted  2. Patient will demonstrate increased AROM to WNL's to allow for proper joint functioning as indicated by patients Functional Deficits. [x]? Progressing: [x]? Met: []? Not Met: []? Adjusted  3. Patient will demonstrate an increase in Strength to 5/5 to allow for proper functional mobility as indicated by patients Functional Deficits. [x]? Progressing: []? Met: []? Not Met: []? Adjusted  4. Patient will return to all functional activities without increased symptoms or restriction. [x]? Progressing: []? Met: []? Not Met: []? Adjusted  5. Pt will be able to return to playing basketball without problems. (patient specific functional goal)    [x]? Progressing: []? Met: []? Not Met: []?  Adjusted          Overall Progression Towards Functional goals/ Treatment Progress Update:  [] Patient is progressing as expected towards functional goals listed. [] Progression is slowed due to complexities/Impairments listed. [] Progression has been slowed due to co-morbidities. [x] Plan just implemented, too soon to assess goals progression <30days   [] Goals require adjustment due to lack of progress  [] Patient is not progressing as expected and requires additional follow up with physician  [] Other    ASSESSMENT: Pt had less fatigue today despite inc in resistance program. Pt was able to perform dynamic movements w/ UE's that went well w/ smooth movement and control and no symptoms in either shoulder. ROM in shoulder progressing w/ less tightness noted. Scapular control improved to good movement w/o cuing needed and clicking resolved. Treatment/Activity Tolerance:  [x] Patient tolerated treatment well [] Patient limited by fatique  [] Patient limited by pain  [] Patient limited by other medical complications  [] Other:     Prognosis: [x] Good [] Fair  [] Poor    Patient Requires Follow-up: [x] Yes  [] No    PLAN: Cont therapy for stability and control of shoulder. Recommend that pt continue therapy to improve strength and stability in shoulder.    [x] Continue per plan of care [] Alter current plan (see comments)  [x] Plan of care initiated [] Hold pending MD visit [] Discharge    Electronically signed by: Zack Ritter, PT, MS, OMT-C      Physical Therapist [id number] [facility] [employer] license #[id number]  Physical Therapist [place] license #[id number]     Note: If patient does not return for scheduled/ recommended follow up visits, this note will serve as a discharge from care along with most recent update on progress.

## 2022-05-23 ENCOUNTER — TREATMENT (OUTPATIENT)
Dept: PHYSICAL THERAPY | Age: 52
End: 2022-05-23
Payer: COMMERCIAL

## 2022-05-23 DIAGNOSIS — M25.511 CHRONIC RIGHT SHOULDER PAIN: Primary | ICD-10-CM

## 2022-05-23 DIAGNOSIS — G89.29 CHRONIC RIGHT SHOULDER PAIN: Primary | ICD-10-CM

## 2022-05-23 PROCEDURE — 97140 MANUAL THERAPY 1/> REGIONS: CPT | Performed by: PHYSICAL THERAPIST

## 2022-05-23 PROCEDURE — 97110 THERAPEUTIC EXERCISES: CPT | Performed by: PHYSICAL THERAPIST

## 2022-05-23 PROCEDURE — 97530 THERAPEUTIC ACTIVITIES: CPT | Performed by: PHYSICAL THERAPIST

## 2022-05-23 NOTE — PROGRESS NOTES
[facility]   Phone: [phone]    Fax: [phone]      Physical Therapy Treatment Note/ Progress Report:      Date:  2022    Patient Name:  Christiano Lee    :  1970  MRN: [id number]  Restrictions/Precautions:    Medical/Treatment Diagnosis Information:  · Diagnosis: M25.511 R shoulder pain chronicity  ·  Treating Diagnosis: limited ROM, pain in shoulder, dec strength in shoulder, dyskinesia scapula  Insurance/Certification information:  PT Insurance Information: [employer]  Physician Information:  Referring Practitioner: Dr. Samuel Pae  Has the plan of care been signed (Y/N):        []  Yes  [x]  No     Date of Patient follow up with Physician: not scheduled      Is this a Progress Report:     [x]  Yes  []  No        If Yes:  Date Range for reporting period:  Beginning 22-   Ending 22    Progress report will be due (10 Rx or 30 days whichever is less):       Recertification will be due (POC Duration  / 90 days whichever is less): 22        Visit # Insurance Allowable Auth Required   16 25 []  Yes []  No        Functional Scale: UEFI 68%    Date assessed:  3/14/2022  4/18/22 UEFI 76/80=95%  22 UEFI       Latex Allergy:  [x]NO      []YES  Preferred Language for Healthcare:   [x]English       []other:    Pain level:  010/10 R shoulder w/o medication; 2-3/10 L shoulder at rest 5-6/10 w/ activity;     SUBJECTIVE:  Pt still has pain in L shoulder at times w/o known cause. R Shoulder is feeling good. Past Medical History: HBP and cardiac condition of stent ; Covid 2021; hx of back fracture     Pain Scale: 0/10 R shoulder; 1-3/10 int in L shoulder;  Easing factors: activity modification;   Provocative factors: shooting basketball; office work; reaching;     Type: [x]? Constant       []? Intermittent  [x]? Radiating     []? Localized     []? other:                Numbness/Tingling: numbness & tingling in entire UE at night when sleeping on either side;      Occupation/School: inside [employer]- sedentary w/o lifting; walking, lifting wts, basketball;      Living Status/Prior Level of Function: Independent with ADLs and IADLs, min limitations intermittently prior to Jan but now constant;     OBJECTIVE: See eval   Observation:   Neck ROM: FB WNL NE, BB 75% NE, rotation WNL's NE, SB 75% NE;  5/23/22 Normal PA mob's in Cspine w/ tightness symmetrically; ROM of neck in supine symmetrical and no pain              ROM PROM AROM  Comment     L R L R 5/18/2022   Flexion 180 180 160 deg 165 (85!)     Abduction 180 180 155 deg 160 (75!)     ER 90 90 T1 T1     IR 40 40 T9 T10     Other             Other                    Strength L R Comment   Flexion 5 5 (2+!) 5/18/2022   Abduction 5 4+(2+!)     ER 5 4+     IR 5 4+     Supraspinatus         Upper Trap         Lower Trap         Mid Trap         Rhomboids         Biceps 5 5     Triceps 5 4+     Horizontal Abduction         Horizontal Adduction         Lats               Special Tests Results/Comment   Estefania Byrd positive   Speeds neg   OBriens     Other     Neurologic Signs     Functional Reach        Reflexes/Sensation:               [x]? Dermatomes/Myotomes intact               [x]? Reflexes equal and normal bilaterally              []?Other:     Joint mobility:               []?Normal                      [x]? Hypo end range for R shoulder;              []?Hyper     Palpation: tightness noted in pec, scapular muscles, infraspinatus, supraspinatus, levator and UT R>L;     Functional Mobility/Transfers: indep in transfers from all positions on eval;     Posture: kyphotic posture w/ rounded shoulders and FHP in upright position; ectomorph body structure;       Test measurements:      RESTRICTIONS/PRECAUTIONS: HBP and cardiac concerns w/ stent implanted 2019    Exercises/Interventions:   Exercises:  Exercise/Equipment Resistance/Repetitions Comments   Cardio/Warm-up     Bike Stretching/PROM     Wand ER @90 10\"x10  Supine AAROM flex 10\"x10      Counter stretch in standing     Doorway stretch 10\"x10    Table Slides     UE Greer     Pulleys x4' warm up     CBA 10\"x10 Rolling over shoulder in side lying position        STRENGTH     Isometrics     Retraction          Weight shift     Flexion     Abduction     External Rotation     Internal Rotation     Biceps     Triceps          PRE's     Flexion       Both shoulders   Abduction 7# 3x10 side lying Both shoulders         External Rotation Side lying 4# 3x10 Both shoulders   Internal Rotation     Shrugs     EXT     Reverse Flys     Serratus      Table push ups 3x10    Horizontal Abd with ER     Biceps     Triceps     Retraction Prone 3# 3x10         Cable Column/Theraband     External Rotation     Internal Rotation     Shrugs     Lats Machine 120# 3x10    Ext     Flex     Scapular Retraction- horizontal         Superman     Scapular retraction Machine 105# 3x10    BIC 12# 3x10    TRIC 60# 3x10    PNF D2 1# in supine x20    ITY in prone 0# 3x10 Cuing needed        Neuromuscular Re-ed     Dynamic Stability     Ball vs wall     Diagonal V pattern     Dynamic ER vs wall 10 blue TB    Ball toss vs MT Side bilat, chest and overhead x15-20 reps ea 11 lb ball         Ed given on diagnosis, expectations & goals X10'; 5/18/22 reviewed                   Manual/Modalities      IASTM using flyby x5'    PROM of B shoulder x10'           Therapeutic Exercise and NMR EXR  [x] (86483) Provided verbal/tactile cueing for activities related to strengthening, flexibility, endurance, ROM  for improvements in scapular, scapulothoracic and UE control with self care, reaching, carrying, lifting, house/yardwork, driving/computer work.    [] (15263) Provided verbal/tactile cueing for activities related to improving balance, coordination, kinesthetic sense, posture, motor skill, proprioception  to assist with  scapular, scapulothoracic and UE control with self care, reaching, carrying, lifting, house/yardwork, driving/computer work. Therapeutic Activities:    [] (85581 or 50776) Provided verbal/tactile cueing for activities related to improving balance, coordination, kinesthetic sense, posture, motor skill, proprioception and motor activation to allow for proper function of scapular, scapulothoracic and UE control with self care, carrying, lifting, driving/computer work. Home Exercise Program:    [x] (49838) Reviewed/Progressed HEP activities related to strengthening, flexibility, endurance, ROM of scapular, scapulothoracic and UE control with self care, reaching, carrying, lifting, house/yardwork, driving/computer work     Written HEP     Access Code: [email or web address]  URL: [email or web address]  Date: 03/30/2022  Prepared by: Luly Rivas    Exercises  Supine Shoulder Flexion Extension AAROM with Dowel - 2 x daily - 7 x weekly - 1 sets - 10 reps - 10 hold  Supine Cross Body Shoulder Stretch - 2 x daily - 7 x weekly - 1 sets - 10 reps - 10 hold  Supine Shoulder External Rotation with Dowel - 2 x daily - 7 x weekly - 1 sets - 10 reps - 10 hold  Sidelying Shoulder Abduction Palm Forward - 1 x daily - 7 x weekly - 3 sets - 10 reps  Sidelying Shoulder ER with Towel and Dumbbell - 1 x daily - 7 x weekly - 3 sets - 10 reps  Standing Shoulder Row with Anchored Resistance - 1 x daily - 7 x weekly - 3 sets - 10 reps  Standing Shoulder Horizontal Abduction with Resistance - 1 x daily - 7 x weekly - 3 sets - 10 reps  Push Up on Table - 1 x daily - 7 x weekly - 3 sets - 10 reps  Prone Scapular Retraction - 1 x daily - 7 x weekly - 2 sets - 10 reps    [] (97066) Reviewed/Progressed HEP activities related to improving balance, coordination, kinesthetic sense, posture, motor skill, proprioception of scapular, scapulothoracic and UE control with self care, reaching, carrying, lifting, house/yardwork, driving and computer work      Manual Treatments:  PROM / Anny Aldridge / Oscillations-Mobs: G-I, II, III, IV (PA's, Inf., Post.)  [] (36238) Provided manual therapy to mobilize soft tissue/joints of cervical/CT, scapular GHJ and UE for the purpose of modulating pain, promoting relaxation,  increasing ROM, reducing/eliminating soft tissue swelling/inflammation/restriction, improving soft tissue extensibility and allowing for proper ROM for normal function with self care, reaching, carrying, lifting, house/yardwork, driving/computer work    Modalities: CP x10 min  To both shoulders   [] GAME READY (VASO)- for significant edema, swelling, pain control. Charges:  Timed Code Treatment Minutes: 55   Total Treatment Minutes: 65     [] EVAL (LOW) 26174 (typically 20 minutes face-to-face)  [] EVAL (MOD) 32152 (typically 30 minutes face-to-face)  [] EVAL (HIGH) 98830 (typically 45 minutes face-to-face)  [] RE-EVAL   [x] SO(10197)0 x 25'    [] IONTO  [] NMR (53726) x     [] VASO  [x] Manual (13089)1x 15'      [] Other:  [x] TA (97668)5 x 15'     [] Mech Traction (35029)  [] ES(attended) (35850)      [] ES (un) (57114):       GOALS:  Patient stated goal: use dominant UE w/o deviations or pain in shoulder. []? Progressing: []? Met: []? Not Met: []? Adjusted     Therapist goals for Patient:   Short Term Goals: To be achieved in: 2 weeks  1. Independent in HEP and progression per patient tolerance, in order to prevent re-injury. []? Progressing: []? Met: []? Not Met: []? Adjusted  2. Patient will have a decrease in pain to facilitate improvement in movement, function, and ADLs as indicated by Functional Deficits. []? Progressing: []? Met: []? Not Met: []? Adjusted     Long Term Goals: To be achieved in: 12 weeks  1. Disability index score of 15% or less for the DASH to assist with reaching prior level of function. [x]? Progressing: []? Met: []? Not Met: []? Adjusted  2. Patient will demonstrate increased AROM to WNL's to allow for proper joint functioning as indicated by patients Functional Deficits. [x]? Progressing: [x]? Met: []? Not Met: []? Adjusted  3. Patient will demonstrate an increase in Strength to 5/5 to allow for proper functional mobility as indicated by patients Functional Deficits. [x]? Progressing: []? Met: []? Not Met: []? Adjusted  4. Patient will return to all functional activities without increased symptoms or restriction. [x]? Progressing: []? Met: []? Not Met: []? Adjusted  5. Pt will be able to return to playing basketball without problems. (patient specific functional goal)    [x]? Progressing: []? Met: []? Not Met: []? Adjusted          Overall Progression Towards Functional goals/ Treatment Progress Update:  [] Patient is progressing as expected towards functional goals listed. [] Progression is slowed due to complexities/Impairments listed. [] Progression has been slowed due to co-morbidities. [x] Plan just implemented, too soon to assess goals progression <30days   [] Goals require adjustment due to lack of progress  [] Patient is not progressing as expected and requires additional follow up with physician  [] Other    ASSESSMENT: L shoulder pain is related to impingement. No tightness noted in neck but general tightness present in ant chest muscles w/ weakness in upper back muscles creating rounded position. Added dynamic muscle activation w/ mod fatigue noted from exercise and freq rest breaks needed. Pt has to concentrate still to improve posture. Treatment/Activity Tolerance:  [x] Patient tolerated treatment well [] Patient limited by fatique  [] Patient limited by pain  [] Patient limited by other medical complications  [] Other:     Prognosis: [x] Good [] Fair  [] Poor    Patient Requires Follow-up: [x] Yes  [] No    PLAN: Cont therapy for stability and control of shoulder.  Recommend that pt continue therapy to improve strength and stability in shoulder. [x] Continue per plan of care [] Alter current plan (see comments)  [x] Plan of care initiated [] Hold pending MD visit [] Discharge    Electronically signed by: Lionel Villafana PT, MS, OMT-C      Physical Therapist [place] license #[id number]  Physical Therapist [place] license #[id number]     Note: If patient does not return for scheduled/ recommended follow up visits, this note will serve as a discharge from care along with most recent update on progress.

## 2022-05-25 ENCOUNTER — TREATMENT (OUTPATIENT)
Dept: PHYSICAL THERAPY | Age: 52
End: 2022-05-25
Payer: COMMERCIAL

## 2022-05-25 DIAGNOSIS — M25.511 CHRONIC RIGHT SHOULDER PAIN: Primary | ICD-10-CM

## 2022-05-25 DIAGNOSIS — G89.29 CHRONIC RIGHT SHOULDER PAIN: Primary | ICD-10-CM

## 2022-05-25 PROCEDURE — 97110 THERAPEUTIC EXERCISES: CPT | Performed by: PHYSICAL THERAPIST

## 2022-05-25 PROCEDURE — 97140 MANUAL THERAPY 1/> REGIONS: CPT | Performed by: PHYSICAL THERAPIST

## 2022-05-25 PROCEDURE — 97530 THERAPEUTIC ACTIVITIES: CPT | Performed by: PHYSICAL THERAPIST

## 2022-05-25 NOTE — PROGRESS NOTES
[facility]   Phone: [phone]    Fax: [phone]      Physical Therapy Treatment Note/ Progress Report:      Date:  2022    Patient Name:  Ludwig Santana    :  1970  MRN: [id number]  Restrictions/Precautions:    Medical/Treatment Diagnosis Information:  · Diagnosis: M25.511 R shoulder pain chronicity  ·  Treating Diagnosis: limited ROM, pain in shoulder, dec strength in shoulder, dyskinesia scapula  Insurance/Certification information:  PT Insurance Information: [employer]  Physician Information:  Referring Practitioner: Dr. Katya Hansen  Has the plan of care been signed (Y/N):        []  Yes  [x]  No     Date of Patient follow up with Physician: not scheduled      Is this a Progress Report:     [x]  Yes  []  No        If Yes:  Date Range for reporting period:  Beginning 22-   Ending 22    Progress report will be due (10 Rx or 30 days whichever is less):       Recertification will be due (POC Duration  / 90 days whichever is less): 22        Visit # Insurance Allowable Auth Required   17 25 []  Yes []  No        Functional Scale: UEFI 68%    Date assessed:  3/14/2022  4/18/22 UEFI 76/80=95%  22 UEFI 70/80=87%     Latex Allergy:  [x]NO      []YES  Preferred Language for Healthcare:   [x]English       []other:    Pain level:  010/10 R shoulder w/o medication; 2-3/10 L shoulder at rest 5-6/10 w/ activity;     SUBJECTIVE:  Pt reports on UEFI survey at function of 87%. Pt states that he is not comfortable w/ throwing a ball yet due to concern of pain. Function progressing in shoulders bilat. R shoulder not giving him pain and L shoulder feeling better. Past Medical History: HBP and cardiac condition of stent ; Covid 2021; hx of back fracture     Pain Scale: 0/10 R shoulder; 1-3/10 int in L shoulder;  Easing factors: activity modification;   Provocative factors: shooting basketball; office work; reaching;     Type: [x]? Constant []?Intermittent  [x]? Radiating     []? Localized     []? 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; walking, lifting wts, basketball;      Living Status/Prior Level of Function: Independent with ADLs and IADLs, min limitations intermittently prior to Jan but now constant;     OBJECTIVE: See eval   Observation:   Neck ROM: FB WNL NE, BB 75% NE, rotation WNL's NE, SB 75% NE;  5/23/22 Normal PA mob's in Cspine w/ tightness symmetrically; ROM of neck in supine symmetrical and no pain              ROM PROM AROM  Comment     L R L R 5/18/2022   Flexion 180 180 160 deg 165 (85!)     Abduction 180 180 155 deg 160 (75!)     ER 90 90 T1 T1     IR 40 40 T9 T10     Other             Other                    Strength L R Comment   Flexion 5 5 (2+!) 5/18/2022   Abduction 5 4+(2+!)     ER 5 4+     IR 5 4+     Supraspinatus         Upper Trap         Lower Trap         Mid Trap         Rhomboids         Biceps 5 5     Triceps 5 4+     Horizontal Abduction         Horizontal Adduction         Lats               Special Tests Results/Comment   Estefania     Nealeshia positive   Speeds neg   OBriens     Other     Neurologic Signs     Functional Reach        Reflexes/Sensation:               [x]? Dermatomes/Myotomes intact               [x]? Reflexes equal and normal bilaterally              []?Other:     Joint mobility:               []?Normal                      [x]? Hypo end range for R shoulder;              []?Hyper     Palpation: tightness noted in pec, scapular muscles, infraspinatus, supraspinatus, levator and UT R>L;     Functional Mobility/Transfers: indep in transfers from all positions on eval;     Posture: kyphotic posture w/ rounded shoulders and FHP in upright position; ectomorph body structure;       Test measurements:      RESTRICTIONS/PRECAUTIONS: HBP and cardiac concerns w/ stent implanted 2019    Exercises/Interventions:   Exercises:  Exercise/Equipment Resistance/Repetitions Comments   Cardio/Warm-up     Bike          Stretching/PROM     Wand ER @90 10\"x10        Counter stretch in standing     Doorway stretch 10\"x10    Table Slides     UE Maupin     Pulleys x4' warm up     CBA 10\"x10 Rolling over shoulder in side lying position        STRENGTH     Isometrics     Retraction          Weight shift     Flexion     Abduction     External Rotation     Internal Rotation     Biceps     Triceps          PRE's     Flexion       Both shoulders   Abduction  Both shoulders         External Rotation Side lying 4# 3x10 Both shoulders   Internal Rotation     Shrugs     EXT     Reverse Flys     Serratus      Table push ups 3x10    Horizontal Abd with ER     Biceps     Triceps     Retraction Prone 3# 3x10         Cable Column/Theraband     External Rotation     Internal Rotation     Shrugs     Lats Machine 120# 3x10    Ext     Flex     Scapular Retraction- horizontal         Superman     Scapular retraction Machine 105# 3x10- upper    BIC    TRIC    PNF D2 1# in sitting x20    ITY in prone 0# 3x10 Cuing needed        Neuromuscular Re-ed     Dynamic Stability     Ball vs wall     Diagonal V pattern     Dynamic ER vs wall x5 blue TB- star pattern    Ball toss vs MT Side bilat, chest and overhead x15-20 reps ea 11 lb ball    Bowling tech 11 lb x15    Throwing tech vs MT 2# x15 Cuing on trunk rotation                  Ed given on diagnosis, expectations & goals X10'; 5/18/22 reviewed                   Manual/Modalities      IASTM using flyby x5'    PROM of B shoulder x10'           Therapeutic Exercise and NMR EXR  [x] (16625) Provided verbal/tactile cueing for activities related to strengthening, flexibility, endurance, ROM  for improvements in scapular, scapulothoracic and UE control with self care, reaching, carrying, lifting, house/yardwork, driving/computer work.    [] (99948) Provided verbal/tactile cueing for activities related to improving balance, coordination, kinesthetic sense, posture, motor skill, proprioception  to assist with  scapular, scapulothoracic and UE control with self care, reaching, carrying, lifting, house/yardwork, driving/computer work. Therapeutic Activities:    [] (62943 or 93413) Provided verbal/tactile cueing for activities related to improving balance, coordination, kinesthetic sense, posture, motor skill, proprioception and motor activation to allow for proper function of scapular, scapulothoracic and UE control with self care, carrying, lifting, driving/computer work. Home Exercise Program:    [x] (91929) Reviewed/Progressed HEP activities related to strengthening, flexibility, endurance, ROM of scapular, scapulothoracic and UE control with self care, reaching, carrying, lifting, house/yardwork, driving/computer work     Written HEP     Access Code: [email or web address]  URL: [email or web address]  Date: 03/30/2022  Prepared by: Florina Montero    Exercises  Supine Shoulder Flexion Extension AAROM with Dowel - 2 x daily - 7 x weekly - 1 sets - 10 reps - 10 hold  Supine Cross Body Shoulder Stretch - 2 x daily - 7 x weekly - 1 sets - 10 reps - 10 hold  Supine Shoulder External Rotation with Dowel - 2 x daily - 7 x weekly - 1 sets - 10 reps - 10 hold  Sidelying Shoulder Abduction Palm Forward - 1 x daily - 7 x weekly - 3 sets - 10 reps  Sidelying Shoulder ER with Towel and Dumbbell - 1 x daily - 7 x weekly - 3 sets - 10 reps  Standing Shoulder Row with Anchored Resistance - 1 x daily - 7 x weekly - 3 sets - 10 reps  Standing Shoulder Horizontal Abduction with Resistance - 1 x daily - 7 x weekly - 3 sets - 10 reps  Push Up on Table - 1 x daily - 7 x weekly - 3 sets - 10 reps  Prone Scapular Retraction - 1 x daily - 7 x weekly - 2 sets - 10 reps    [] (94889) Reviewed/Progressed HEP activities related to improving balance, coordination, kinesthetic sense, posture, motor skill, proprioception of scapular, scapulothoracic and UE control with self care, reaching, carrying, lifting, house/yardwork, driving and computer work      Manual Treatments:  PROM / STM / Oscillations-Mobs:  G-I, II, III, IV (PA's, Inf., Post.)  [] (88138) Provided manual therapy to mobilize soft tissue/joints of cervical/CT, scapular GHJ and UE for the purpose of modulating pain, promoting relaxation,  increasing ROM, reducing/eliminating soft tissue swelling/inflammation/restriction, improving soft tissue extensibility and allowing for proper ROM for normal function with self care, reaching, carrying, lifting, house/yardwork, driving/computer work    Modalities: CP x10 min  To both shoulders   [] GAME READY (VASO)- for significant edema, swelling, pain control. Charges:  Timed Code Treatment Minutes: 55   Total Treatment Minutes: 65     [] EVAL (LOW) 48807 (typically 20 minutes face-to-face)  [] EVAL (MOD) 29201 (typically 30 minutes face-to-face)  [] EVAL (HIGH) 14770 (typically 45 minutes face-to-face)  [] RE-EVAL   [x] LP(37675)6 x 25'    [] IONTO  [] NMR (26720) x     [] VASO  [x] Manual (79328)1x 15'      [] Other:  [x] TA (84394)7 x 15'     [] Mech Traction (31834)  [] ES(attended) (22328)      [] ES (un) (48420):       GOALS:  Patient stated goal: use dominant UE w/o deviations or pain in shoulder. []? Progressing: []? Met: []? Not Met: []? Adjusted     Therapist goals for Patient:   Short Term Goals: To be achieved in: 2 weeks  1. Independent in HEP and progression per patient tolerance, in order to prevent re-injury. []? Progressing: []? Met: []? Not Met: []? Adjusted  2. Patient will have a decrease in pain to facilitate improvement in movement, function, and ADLs as indicated by Functional Deficits. []? Progressing: []? Met: []? Not Met: []? Adjusted     Long Term Goals: To be achieved in: 12 weeks  1.  Disability index score of 15% or less for the DASH to assist with reaching prior level of function. [x]? Progressing: []? Met: []? Not Met: []? Adjusted  2. Patient will demonstrate increased AROM to WNL's to allow for proper joint functioning as indicated by patients Functional Deficits. [x]? Progressing: [x]? Met: []? Not Met: []? Adjusted  3. Patient will demonstrate an increase in Strength to 5/5 to allow for proper functional mobility as indicated by patients Functional Deficits. [x]? Progressing: []? Met: []? Not Met: []? Adjusted  4. Patient will return to all functional activities without increased symptoms or restriction. [x]? Progressing: []? Met: []? Not Met: []? Adjusted  5. Pt will be able to return to playing basketball without problems. (patient specific functional goal)    [x]? Progressing: []? Met: []? Not Met: []? Adjusted          Overall Progression Towards Functional goals/ Treatment Progress Update:  [] Patient is progressing as expected towards functional goals listed. [] Progression is slowed due to complexities/Impairments listed. [] Progression has been slowed due to co-morbidities. [x] Plan just implemented, too soon to assess goals progression <30days   [] Goals require adjustment due to lack of progress  [] Patient is not progressing as expected and requires additional follow up with physician  [] Other    ASSESSMENT: trial of functional activities ie throwing and bowling went well. Pt has some postural deviations w/ tightness in ant chest region vs weakness in upper back. Pt did well w/ program for strengthening w/ endurance. Pt has good body mechanics after instructions. Printed out updated written HEP for pt.                                                                                                                                                                      Treatment/Activity Tolerance:  [x] Patient tolerated treatment well [] Patient limited by fatique  [] Patient limited by pain  [] Patient limited by other medical complications  [] Other: Prognosis: [x] Good [] Fair  [] Poor    Patient Requires Follow-up: [x] Yes  [] No    PLAN: Cont therapy for stability and control of shoulder. Pt will focus on HEP for 2 weeks and then return to clinic for reassessment. [x] Continue per plan of care [] Alter current plan (see comments)  [x] Plan of care initiated [] Hold pending MD visit [] Discharge    Electronically signed by: Ashlee Sun, PT, MS, OMT-C      Physical Therapist [place] license #[id number]  Physical Therapist [place] license #[id number]     Note: If patient does not return for scheduled/ recommended follow up visits, this note will serve as a discharge from care along with most recent update on progress.

## 2023-01-26 ENCOUNTER — APPOINTMENT (OUTPATIENT)
Dept: GENERAL RADIOLOGY | Age: 53
End: 2023-01-26
Payer: COMMERCIAL

## 2023-01-26 ENCOUNTER — HOSPITAL ENCOUNTER (OUTPATIENT)
Age: 53
Setting detail: OBSERVATION
Discharge: HOME | End: 2023-01-27
Attending: EMERGENCY MEDICINE | Admitting: INTERNAL MEDICINE
Payer: COMMERCIAL

## 2023-01-26 DIAGNOSIS — R07.9 CHEST PAIN, UNSPECIFIED TYPE: Primary | ICD-10-CM

## 2023-01-26 PROBLEM — I25.83 CORONARY ARTERY DISEASE DUE TO LIPID RICH PLAQUE: Status: ACTIVE | Noted: 2019-03-01

## 2023-01-26 PROBLEM — R07.2 PRECORDIAL PAIN: Status: ACTIVE | Noted: 2023-01-26

## 2023-01-26 PROBLEM — R94.39 ABNORMAL STRESS TEST: Status: RESOLVED | Noted: 2019-03-14 | Resolved: 2023-01-26

## 2023-01-26 LAB
A/G RATIO: 1.6 (ref 1.1–2.2)
ALBUMIN SERPL-MCNC: 4.7 G/DL (ref 3.4–5)
ALP BLD-CCNC: 97 U/L (ref 40–129)
ALT SERPL-CCNC: 79 U/L (ref 10–40)
ANION GAP SERPL CALCULATED.3IONS-SCNC: 16 MMOL/L (ref 3–16)
AST SERPL-CCNC: 50 U/L (ref 15–37)
BASOPHILS ABSOLUTE: 0.2 K/UL (ref 0–0.2)
BASOPHILS RELATIVE PERCENT: 2.9 %
BILIRUB SERPL-MCNC: 1 MG/DL (ref 0–1)
BUN BLDV-MCNC: 18 MG/DL (ref 7–20)
CALCIUM SERPL-MCNC: 10 MG/DL (ref 8.3–10.6)
CHLORIDE BLD-SCNC: 100 MMOL/L (ref 99–110)
CO2: 23 MMOL/L (ref 21–32)
CREAT SERPL-MCNC: 1 MG/DL (ref 0.9–1.3)
EOSINOPHILS ABSOLUTE: 0.5 K/UL (ref 0–0.6)
EOSINOPHILS RELATIVE PERCENT: 7.9 %
GFR SERPL CREATININE-BSD FRML MDRD: >60 ML/MIN/{1.73_M2}
GLUCOSE BLD-MCNC: 97 MG/DL (ref 70–99)
HCT VFR BLD CALC: 42.9 % (ref 40.5–52.5)
HEMOGLOBIN: 13.9 G/DL (ref 13.5–17.5)
LIPASE: 55 U/L (ref 13–60)
LYMPHOCYTES ABSOLUTE: 1.4 K/UL (ref 1–5.1)
LYMPHOCYTES RELATIVE PERCENT: 23.3 %
MCH RBC QN AUTO: 30.7 PG (ref 26–34)
MCHC RBC AUTO-ENTMCNC: 32.5 G/DL (ref 31–36)
MCV RBC AUTO: 94.7 FL (ref 80–100)
MONOCYTES ABSOLUTE: 0.6 K/UL (ref 0–1.3)
MONOCYTES RELATIVE PERCENT: 9.9 %
NEUTROPHILS ABSOLUTE: 3.5 K/UL (ref 1.7–7.7)
NEUTROPHILS RELATIVE PERCENT: 56 %
PDW BLD-RTO: 14 % (ref 12.4–15.4)
PLATELET # BLD: 208 K/UL (ref 135–450)
PMV BLD AUTO: 8.7 FL (ref 5–10.5)
POTASSIUM REFLEX MAGNESIUM: 3.8 MMOL/L (ref 3.5–5.1)
RBC # BLD: 4.53 M/UL (ref 4.2–5.9)
SODIUM BLD-SCNC: 139 MMOL/L (ref 136–145)
TOTAL PROTEIN: 7.7 G/DL (ref 6.4–8.2)
TROPONIN: <0.01 NG/ML
TROPONIN: <0.01 NG/ML
WBC # BLD: 6.2 K/UL (ref 4–11)

## 2023-01-26 PROCEDURE — 80053 COMPREHEN METABOLIC PANEL: CPT

## 2023-01-26 PROCEDURE — 6360000002 HC RX W HCPCS: Performed by: INTERNAL MEDICINE

## 2023-01-26 PROCEDURE — 6370000000 HC RX 637 (ALT 250 FOR IP): Performed by: INTERNAL MEDICINE

## 2023-01-26 PROCEDURE — 84484 ASSAY OF TROPONIN QUANT: CPT

## 2023-01-26 PROCEDURE — 96375 TX/PRO/DX INJ NEW DRUG ADDON: CPT

## 2023-01-26 PROCEDURE — 99285 EMERGENCY DEPT VISIT HI MDM: CPT

## 2023-01-26 PROCEDURE — 96372 THER/PROPH/DIAG INJ SC/IM: CPT

## 2023-01-26 PROCEDURE — 36415 COLL VENOUS BLD VENIPUNCTURE: CPT

## 2023-01-26 PROCEDURE — 2500000003 HC RX 250 WO HCPCS: Performed by: EMERGENCY MEDICINE

## 2023-01-26 PROCEDURE — 85025 COMPLETE CBC W/AUTO DIFF WBC: CPT

## 2023-01-26 PROCEDURE — G0378 HOSPITAL OBSERVATION PER HR: HCPCS

## 2023-01-26 PROCEDURE — 2580000003 HC RX 258: Performed by: INTERNAL MEDICINE

## 2023-01-26 PROCEDURE — 83690 ASSAY OF LIPASE: CPT

## 2023-01-26 PROCEDURE — 96374 THER/PROPH/DIAG INJ IV PUSH: CPT

## 2023-01-26 PROCEDURE — 71046 X-RAY EXAM CHEST 2 VIEWS: CPT

## 2023-01-26 PROCEDURE — 6370000000 HC RX 637 (ALT 250 FOR IP): Performed by: EMERGENCY MEDICINE

## 2023-01-26 PROCEDURE — 93005 ELECTROCARDIOGRAM TRACING: CPT | Performed by: EMERGENCY MEDICINE

## 2023-01-26 RX ORDER — OMEGA-3S/DHA/EPA/FISH OIL 1000-1400
2 CAPSULE,DELAYED RELEASE (ENTERIC COATED) ORAL DAILY
COMMUNITY

## 2023-01-26 RX ORDER — ONDANSETRON 2 MG/ML
4 INJECTION INTRAMUSCULAR; INTRAVENOUS EVERY 6 HOURS PRN
Status: DISCONTINUED | OUTPATIENT
Start: 2023-01-26 | End: 2023-01-27 | Stop reason: HOSPADM

## 2023-01-26 RX ORDER — AMLODIPINE BESYLATE 5 MG/1
5 TABLET ORAL DAILY
Status: DISCONTINUED | OUTPATIENT
Start: 2023-01-27 | End: 2023-01-27 | Stop reason: HOSPADM

## 2023-01-26 RX ORDER — HYDRALAZINE HYDROCHLORIDE 20 MG/ML
10 INJECTION INTRAMUSCULAR; INTRAVENOUS EVERY 4 HOURS PRN
Status: DISCONTINUED | OUTPATIENT
Start: 2023-01-26 | End: 2023-01-27 | Stop reason: HOSPADM

## 2023-01-26 RX ORDER — POLYETHYLENE GLYCOL 3350 17 G/17G
17 POWDER, FOR SOLUTION ORAL DAILY PRN
Status: DISCONTINUED | OUTPATIENT
Start: 2023-01-26 | End: 2023-01-27 | Stop reason: HOSPADM

## 2023-01-26 RX ORDER — SODIUM CHLORIDE 0.9 % (FLUSH) 0.9 %
5-40 SYRINGE (ML) INJECTION EVERY 12 HOURS SCHEDULED
Status: DISCONTINUED | OUTPATIENT
Start: 2023-01-26 | End: 2023-01-27 | Stop reason: HOSPADM

## 2023-01-26 RX ORDER — LOSARTAN POTASSIUM 100 MG/1
100 TABLET ORAL DAILY
Status: DISCONTINUED | OUTPATIENT
Start: 2023-01-27 | End: 2023-01-27 | Stop reason: HOSPADM

## 2023-01-26 RX ORDER — ACETAMINOPHEN 650 MG/1
650 SUPPOSITORY RECTAL EVERY 6 HOURS PRN
Status: DISCONTINUED | OUTPATIENT
Start: 2023-01-26 | End: 2023-01-27 | Stop reason: HOSPADM

## 2023-01-26 RX ORDER — PANTOPRAZOLE SODIUM 40 MG/1
40 TABLET, DELAYED RELEASE ORAL
Status: DISCONTINUED | OUTPATIENT
Start: 2023-01-27 | End: 2023-01-27 | Stop reason: HOSPADM

## 2023-01-26 RX ORDER — FAMOTIDINE 20 MG/1
20 TABLET, FILM COATED ORAL 2 TIMES DAILY
Status: DISCONTINUED | OUTPATIENT
Start: 2023-01-26 | End: 2023-01-27

## 2023-01-26 RX ORDER — ASPIRIN 81 MG/1
81 TABLET, CHEWABLE ORAL DAILY
Status: DISCONTINUED | OUTPATIENT
Start: 2023-01-27 | End: 2023-01-27 | Stop reason: HOSPADM

## 2023-01-26 RX ORDER — ASPIRIN 81 MG/1
324 TABLET, CHEWABLE ORAL ONCE
Status: COMPLETED | OUTPATIENT
Start: 2023-01-26 | End: 2023-01-26

## 2023-01-26 RX ORDER — METOPROLOL TARTRATE 5 MG/5ML
5 INJECTION INTRAVENOUS ONCE
Status: COMPLETED | OUTPATIENT
Start: 2023-01-26 | End: 2023-01-26

## 2023-01-26 RX ORDER — SODIUM CHLORIDE 9 MG/ML
INJECTION, SOLUTION INTRAVENOUS PRN
Status: DISCONTINUED | OUTPATIENT
Start: 2023-01-26 | End: 2023-01-27 | Stop reason: HOSPADM

## 2023-01-26 RX ORDER — FLUTICASONE PROPIONATE 50 MCG
1 SPRAY, SUSPENSION (ML) NASAL DAILY
COMMUNITY

## 2023-01-26 RX ORDER — METOPROLOL SUCCINATE 25 MG/1
25 TABLET, EXTENDED RELEASE ORAL DAILY
Status: DISCONTINUED | OUTPATIENT
Start: 2023-01-27 | End: 2023-01-27 | Stop reason: HOSPADM

## 2023-01-26 RX ORDER — ACETAMINOPHEN 325 MG/1
650 TABLET ORAL EVERY 6 HOURS PRN
Status: DISCONTINUED | OUTPATIENT
Start: 2023-01-26 | End: 2023-01-27 | Stop reason: HOSPADM

## 2023-01-26 RX ORDER — ATORVASTATIN CALCIUM 40 MG/1
40 TABLET, FILM COATED ORAL DAILY
Status: DISCONTINUED | OUTPATIENT
Start: 2023-01-27 | End: 2023-01-27 | Stop reason: HOSPADM

## 2023-01-26 RX ORDER — ENOXAPARIN SODIUM 100 MG/ML
30 INJECTION SUBCUTANEOUS 2 TIMES DAILY
Status: DISCONTINUED | OUTPATIENT
Start: 2023-01-26 | End: 2023-01-27 | Stop reason: HOSPADM

## 2023-01-26 RX ORDER — ONDANSETRON 4 MG/1
4 TABLET, ORALLY DISINTEGRATING ORAL EVERY 8 HOURS PRN
Status: DISCONTINUED | OUTPATIENT
Start: 2023-01-26 | End: 2023-01-27 | Stop reason: HOSPADM

## 2023-01-26 RX ORDER — ENOXAPARIN SODIUM 100 MG/ML
40 INJECTION SUBCUTANEOUS DAILY
Status: DISCONTINUED | OUTPATIENT
Start: 2023-01-26 | End: 2023-01-26 | Stop reason: DRUGHIGH

## 2023-01-26 RX ORDER — SODIUM CHLORIDE 0.9 % (FLUSH) 0.9 %
5-40 SYRINGE (ML) INJECTION PRN
Status: DISCONTINUED | OUTPATIENT
Start: 2023-01-26 | End: 2023-01-27 | Stop reason: HOSPADM

## 2023-01-26 RX ORDER — FAMOTIDINE 10 MG
10 TABLET ORAL 2 TIMES DAILY PRN
Status: ON HOLD | COMMUNITY
End: 2023-01-27 | Stop reason: HOSPADM

## 2023-01-26 RX ADMIN — HYDRALAZINE HYDROCHLORIDE 10 MG: 20 INJECTION INTRAMUSCULAR; INTRAVENOUS at 21:03

## 2023-01-26 RX ADMIN — ENOXAPARIN SODIUM 30 MG: 100 INJECTION SUBCUTANEOUS at 21:02

## 2023-01-26 RX ADMIN — ASPIRIN 324 MG: 81 TABLET, CHEWABLE ORAL at 14:24

## 2023-01-26 RX ADMIN — FAMOTIDINE 20 MG: 20 TABLET, FILM COATED ORAL at 21:02

## 2023-01-26 RX ADMIN — SODIUM CHLORIDE, PRESERVATIVE FREE 10 ML: 5 INJECTION INTRAVENOUS at 21:03

## 2023-01-26 RX ADMIN — METOPROLOL TARTRATE 5 MG: 5 INJECTION INTRAVENOUS at 16:17

## 2023-01-26 ASSESSMENT — PAIN DESCRIPTION - DESCRIPTORS: DESCRIPTORS: PRESSURE

## 2023-01-26 ASSESSMENT — ENCOUNTER SYMPTOMS
SORE THROAT: 0
EYE REDNESS: 0
SHORTNESS OF BREATH: 0
RHINORRHEA: 0
ABDOMINAL PAIN: 0

## 2023-01-26 ASSESSMENT — PAIN DESCRIPTION - LOCATION: LOCATION: CHEST

## 2023-01-26 ASSESSMENT — HEART SCORE: ECG: 0

## 2023-01-26 ASSESSMENT — PAIN SCALES - GENERAL: PAINLEVEL_OUTOF10: 4

## 2023-01-26 ASSESSMENT — PAIN - FUNCTIONAL ASSESSMENT: PAIN_FUNCTIONAL_ASSESSMENT: 0-10

## 2023-01-26 NOTE — ED NOTES
Pharmacy Medication Reconciliation Note     List of medications patient is currently taking is complete. Source of information:   EMR  patient    Notes regarding home medications:   Reports taking his morning medications today, including atorvastatin which he takes in the morning.     Medications Removed/Flagged for Review and Reason:    Marga PatelD, BCPS  1/26/2023  5:49 PM

## 2023-01-26 NOTE — PROGRESS NOTES
Pt arrived to the floor from the ED. Oriented to room and call light system. Spouse at bedside. Call light with in reach.

## 2023-01-26 NOTE — PROGRESS NOTES
Clinical Pharmacy Note  Subcutaneous Anticoagulant Adjustment     Enoxaparin has been adjusted to 30 mg BID based on [facility] policy. Recent Labs     01/26/23  1408   CREATININE 1.0     Recent Labs     01/26/23  1408   HGB 13.9   HCT 42.9        Estimated Creatinine Clearance: 114 mL/min (based on SCr of 1 mg/dL). Pharmacist Review of Appropriate Use and Automatic Dose Adjustment of Subcutaneous Anticoagulants (Adult)    The guidance below is to provide initial recommendations for dosing. If recommended dose does not align well with patient's current clinical picture, communications with the care team will occur to determine most appropriate medication and dose. TABLE 1. ENOXAPARIN ROUTINE PROPHYLAXIS DOSING (Medically ill, routine surgery)   Patient Weight (kg)     50.9 and below 51 - 100.9 101 - 150.9 151 - 174.9 175 or greater         Estimated CrCl  (ml/min) 30 or greater   30 mg SUBQ daily   40 mg SUBQ daily 30 mg SUBQ BID  40 mg SUBQ BID 60mg SUBQ BID      15-29 UFH 5000 units SUBQ BID   30 mg SUBQ daily 30 mg SUBQ daily 40 mg SUBQ daily   60 mg SUBQ daily      Less than 15 or Dialysis UFH 5000 units SUBQ BID   UFH 5000 units SUBQ TID UFH 7500 units SUBQ TID       TABLE 2. ENOXAPARIN TREATMENT DOSING   (Based on 1mg/kg BID for DVT/PE/AFib)   Patient Weight (kg)     50.9 and below .9 151-189.9 190 or greater         Estimated CrCl  (ml/min) 30 or greater Recommend [facility] standardized UFH infusion, apixaban or rivaroxaban 1mg/kg SUBQ BID 1mg/kg SUBQ BID if anti-Xa levels are feasible per institution. Alternatively,  recommend switch to [facility] standardized UFH infusion     Recommend switch to [facility] standardized UFH infusion. 15-29 Recommend [facility] standardized UFH infusion or apixaban 1mg/kg SUBQ daily Recommend switch to [facility] standardized UFH infusion     Less than 15 or Dialysis Recommend switch to [facility] standardized UFH infusion.      [id number] [facility] Wendy [facility] 1/26/2023 6:39 PM

## 2023-01-26 NOTE — ED NOTES
ED SBAR report provided to Sangeeta Camargo, [facility]. Patient to be transported to Room 4128 via stretcher by transportation tech. Transported with cardiac monitoring. IV site clean, dry, and intact. MEWS score and pain assessed and documented. Patient's score on the SIMPSON Fall scale reviewed with receiving RN. Updated pt on plan of care.        Bright Joy RN  01/26/23 3499

## 2023-01-26 NOTE — ED PROVIDER NOTES
[address] [facility]            Pt Name: Venus Gonzalez   MRN: [id number]   [place] 1970   Date of evaluation: 1/26/2023   Provider: Prateek Medley MD   PCP: Michaela Aguilera MD   Note Started: 2:09 PM EST 1/26/23          CHIEF COMPLAINT     Chief Complaint   Patient presents with    Chest Pain     Has been on/off since changes in blood pressure medications over the last few weeks. Highest SPB at home 148. HISTORY OF PRESENT ILLNESS:   History from : Patient   Limitations to history : None     Venus Gonzalez is a 47 y.o. male who presents complaint of chest discomfort. The patient states in December he had a chronic cough and they took him off an ACE inhibitor and started him on other blood pressure medications. Since then he states that his blood pressure is really not been super well controlled and at times he feels lightheaded. He states for the last week or 2 he has been having this chest discomfort that feels like a burning in his anterior chest or upper epigastric area. No nausea vomiting or diaphoresis. No change with exertion. Cardiac risk factors: Obesity, coronary artery disease history, hyperlipidemia, he stopped smoking in 2018. Nursing Notes were all reviewed and agreed with, or any disagreements were addressed in the HPI. REVIEW OF SYSTEMS :    Review of Systems   Constitutional:  Negative for fever. HENT:  Negative for rhinorrhea and sore throat. Eyes:  Negative for redness. Respiratory:  Negative for shortness of breath. Cardiovascular:  Positive for chest pain. Gastrointestinal:  Negative for abdominal pain. Genitourinary:  Negative for flank pain. Neurological:  Negative for headaches. Hematological:  Negative for adenopathy. Psychiatric/Behavioral:  Negative for confusion.        MEDICAL HISTORY   has a past medical history of CAD (coronary artery disease) (03/2019), Depression, Hyperlipidemia, Hypertension, and Lumbar vertebral fracture ([facility] [address] Past Surgical History:   Procedure Laterality Date    COLONOSCOPY  07/12/2021    Arciniega-sigmoid diverticulosis, polyp x2, repeat colonoscopy in 5 years    CORONARY ANGIOPLASTY WITH STENT PLACEMENT  03/14/2009    Mark Box- % occluded, KATE x 1.  (30% circ, 50% mid LAD)    PTCA  03/2019    KATE to RCA    UPPER GASTROINTESTINAL ENDOSCOPY  07/12/2021    Arciniega      CURRENTMEDICATIONS       Previous Medications    AMLODIPINE (NORVASC) 5 MG TABLET    Take 1 tablet by mouth daily    ASPIRIN (ASPIRIN CHILDRENS) 81 MG CHEWABLE TABLET    Take 1 tablet by mouth daily    ATORVASTATIN (LIPITOR) 40 MG TABLET    TAKE 1 TABLET BY MOUTH EVERY NIGHT    CETIRIZINE (ZYRTEC) 10 MG TABLET    Take 10 mg by mouth daily    CYANOCOBALAMIN (B-12) 100 MCG TABS    Take by mouth daily    FAMOTIDINE (PEPCID) 20 MG TABLET    Take 1 tablet by mouth 2 times daily    LOSARTAN (COZAAR) 100 MG TABLET    Take 1 tablet by mouth daily    METOPROLOL SUCCINATE (TOPROL XL) 25 MG EXTENDED RELEASE TABLET    TAKE 1 TABLET BY MOUTH DAILY    MULTIPLE VITAMIN (MULTI-VITAMIN DAILY PO)    Take by mouth daily    PANTOPRAZOLE (PROTONIX) 40 MG TABLET    TAKE 1 TABLET BY MOUTH EVERY MORNING BEFORE BREAKFAST    SILDENAFIL (REVATIO) 20 MG TABLET    TAKE ONE TO FIVE TABLETS BY MOUTH DAILY AS NEEDED FOR ERECTILE DYSFUNCTION    SYMBICORT 160-4.5 MCG/ACT AERO    INHALE 2 PUFFS INTO THE LUNGS TWICE DAILY      SCREENINGS          Blanchard Coma Scale  Eye Opening: Spontaneous  Best Verbal Response: Oriented  Best Motor Response: Obeys commands  Nelly Coma Scale Score: 15        Heart Score for chest pain patients  History:  Moderately Suspicious  ECG: Normal  Patient Age: > 39 and < 65 years  *Risk factors for Atherosclerotic disease: Hypercholesterolemia, Hypertension, Obesity  Risk Factors: > 3 Risk factors or history of atherosclerotic disease*  Troponin: < 1X normal limit  Heart Score Total: 4       CIWA Assessment  BP: (!) 166/94  Heart Rate: 86                  PHYSICAL EXAM :  ED Triage Vitals [01/26/23 1342]   BP Temp Temp Source Heart Rate Resp SpO2 Height Weight   (!) 179/102 97.5 °F (36.4 °C) Oral 79 18 93 % 5' 8\" (1.727 m) 281 lb 1.4 oz (127.5 kg)        Physical Exam  Vitals and nursing note reviewed. Constitutional:       Appearance: He is well-developed. He is not diaphoretic. HENT:      Head: Normocephalic and atraumatic. Nose: Nose normal.      Mouth/Throat:      Mouth: Mucous membranes are moist.   Eyes:      General:         Right eye: No discharge. Left eye: No discharge. Conjunctiva/sclera: Conjunctivae normal.   Cardiovascular:      Rate and Rhythm: Normal rate. Pulses: Normal pulses. Heart sounds: No murmur heard. Pulmonary:      Effort: Pulmonary effort is normal. No respiratory distress. Breath sounds: No wheezing, rhonchi or rales. Abdominal:      Palpations: Abdomen is soft. Tenderness: There is no abdominal tenderness. There is no guarding. Comments: No tenderness to palpation. No rebound or guarding. Musculoskeletal:         General: Normal range of motion. Cervical back: Neck supple. Skin:     General: Skin is warm and dry. Capillary Refill: Capillary refill takes less than 2 seconds. Neurological:      Mental Status: He is alert and oriented to person, place, and time. Psychiatric:         Behavior: Behavior normal.     ___    DIAGNOSTIC RESULTS     LABS:   Labs Reviewed   COMPREHENSIVE METABOLIC PANEL W/ REFLEX TO MG FOR LOW K - Abnormal; Notable for the following components:       Result Value    ALT 79 (*)     AST 50 (*)     All other components within normal limits   CBC WITH AUTO DIFFERENTIAL   LIPASE   TROPONIN      When ordered only abnormal lab results are displayed. All other labs were within normal range or not returned as of this dictation.      RADIOLOGY:      Non-plain film images such as CT, Ultrasound and MRI are read by the radiologist. Plain radiographic images are visualized and preliminarily interpreted by the ED Provider with the below findings:   Interpretation per the Radiologist below, if available at the time of this note:     XR CHEST (2 VW)   Final Result   No radiographic evidence of acute pulmonary disease. No results found. EKG: The Ekg interpreted by me shows  normal sinus rhythm with a rate of 77  Axis is   Left axis deviation  QTc is  normal  Intervals and Durations are unremarkable. ST Segments: Patient has some nonspecific ST changes in 1, aVL, V5 and 6. When compared to an EKG performed on December 3, 2021 these changes are slightly more pronounced. PROCEDURES   Unless otherwise noted below, none     CRITICAL CARE TIME   15 min        Vitals:    Vitals:    01/26/23 1342 01/26/23 1353   BP: (!) 179/102 (!) 166/94   Pulse: 79 86   Resp: 18 14   Temp: 97.5 °F (36.4 °C) 97.7 °F (36.5 °C)   TempSrc: Oral Oral   SpO2: 93% 97%   Weight: 281 lb 1.4 oz (127.5 kg)    Height: 5' 8\" (1.727 m)              Is this patient to be included in the SEP-1 Core Measure due to severe sepsis or septic shock? No   Exclusion criteria - the patient is NOT to be included for SEP-1 Core Measure due to: Infection is not suspected       CC/HPI Summary, DDx, ED Course, and Reassessment:     DDX: Unstable angina, non-ST elevation MI, pneumothorax, pancreatitis, other    This patient has 3 cardiac risk factors and because of those and some concern with his underlying coronary artery disease for his chest discomfort he scores a heart score of 4. His work-up in the ED showed no acute findings on his chest x-ray. EKG shows sinus rhythm without Acute changes. His laboratory studies reveal lipase is normal.  His CMP is unremarkable with the exception of an increased ALT to AST.   This is not new but a little bit worse than normal.  The patient had no abdominal tenderness on exam so I do not suspect pancreatitis or cholelithiasis or acute cholecystitis at this time. His white blood cell count is normal.  His troponin is normal.  I spoke with the patient's primary care provider. At this time due to the increase in blood pressure and heart rate Lopressor 5 mg IV will be given and he will be admitted to Dr. Renan Morales his primary care provider. Patient was given the following medications:   Medications   metoprolol (LOPRESSOR) injection 5 mg (has no administration in time range)   aspirin chewable tablet 324 mg (324 mg Oral Given 1/26/23 1424)        CONSULTS: (Who and What was discussed)  IP CONSULT TO INTERNAL MEDICINE    Discussion with Other Profesionals : Admitting Team Dr Renan Morales    Social Determinants : None    Records Reviewed : None    Chronic Conditions:   has a past medical history of CAD (coronary artery disease) (03/2019), Depression, Hyperlipidemia, Hypertension, and Lumbar vertebral fracture ([facility] [address] Disposition Considerations:    I am the Primary Clinician of Record. FINAL IMPRESSION    1. Chest pain, unspecified type           DISPOSITION/PLAN     PATIENT REFERRED TO:   No follow-up provider specified.      DISCHARGE MEDICATIONS:   New Prescriptions    No medications on file        DISCONTINUED MEDICATIONS:   Discontinued Medications    No medications on file              (Please note that portions of this note were completed with a voice recognition program.  Efforts were made to edit the dictations but occasionally words are mis-transcribed.)       Breanne Leggett MD (electronically signed)              Breanne Leggett MD  01/26/23 6546

## 2023-01-27 VITALS
SYSTOLIC BLOOD PRESSURE: 135 MMHG | OXYGEN SATURATION: 96 % | WEIGHT: 219 LBS | TEMPERATURE: 97.5 F | HEIGHT: 68 IN | RESPIRATION RATE: 16 BRPM | BODY MASS INDEX: 33.19 KG/M2 | DIASTOLIC BLOOD PRESSURE: 81 MMHG | HEART RATE: 71 BPM

## 2023-01-27 PROBLEM — F41.9 ANXIETY: Status: ACTIVE | Noted: 2023-01-27

## 2023-01-27 PROBLEM — R07.89 CHEST PAIN, ATYPICAL: Status: ACTIVE | Noted: 2019-03-14

## 2023-01-27 PROBLEM — F41.1 GAD (GENERALIZED ANXIETY DISORDER): Status: ACTIVE | Noted: 2023-01-27

## 2023-01-27 LAB
CHOLESTEROL, TOTAL: 162 MG/DL (ref 0–199)
EKG ATRIAL RATE: 75 BPM
EKG ATRIAL RATE: 77 BPM
EKG DIAGNOSIS: NORMAL
EKG DIAGNOSIS: NORMAL
EKG P AXIS: 41 DEGREES
EKG P AXIS: 55 DEGREES
EKG P-R INTERVAL: 174 MS
EKG P-R INTERVAL: 178 MS
EKG Q-T INTERVAL: 384 MS
EKG Q-T INTERVAL: 394 MS
EKG QRS DURATION: 114 MS
EKG QRS DURATION: 114 MS
EKG QTC CALCULATION (BAZETT): 428 MS
EKG QTC CALCULATION (BAZETT): 445 MS
EKG R AXIS: -12 DEGREES
EKG R AXIS: -29 DEGREES
EKG T AXIS: 19 DEGREES
EKG T AXIS: 21 DEGREES
EKG VENTRICULAR RATE: 75 BPM
EKG VENTRICULAR RATE: 77 BPM
HCT VFR BLD CALC: 42.2 % (ref 40.5–52.5)
HDLC SERPL-MCNC: 53 MG/DL (ref 40–60)
HEMOGLOBIN: 13.6 G/DL (ref 13.5–17.5)
LDL CHOLESTEROL CALCULATED: 91 MG/DL
LV EF: 73 %
LVEF MODALITY: NORMAL
MCH RBC QN AUTO: 30.5 PG (ref 26–34)
MCHC RBC AUTO-ENTMCNC: 32.3 G/DL (ref 31–36)
MCV RBC AUTO: 94.4 FL (ref 80–100)
PDW BLD-RTO: 13.7 % (ref 12.4–15.4)
PLATELET # BLD: 202 K/UL (ref 135–450)
PLATELET SLIDE REVIEW: ADEQUATE
PMV BLD AUTO: 9.5 FL (ref 5–10.5)
RBC # BLD: 4.47 M/UL (ref 4.2–5.9)
SLIDE REVIEW: NORMAL
TRIGL SERPL-MCNC: 91 MG/DL (ref 0–150)
TROPONIN: <0.01 NG/ML
VLDLC SERPL CALC-MCNC: 18 MG/DL
WBC # BLD: 8.7 K/UL (ref 4–11)

## 2023-01-27 PROCEDURE — A9502 TC99M TETROFOSMIN: HCPCS

## 2023-01-27 PROCEDURE — 36415 COLL VENOUS BLD VENIPUNCTURE: CPT

## 2023-01-27 PROCEDURE — 6370000000 HC RX 637 (ALT 250 FOR IP): Performed by: INTERNAL MEDICINE

## 2023-01-27 PROCEDURE — G0378 HOSPITAL OBSERVATION PER HR: HCPCS

## 2023-01-27 PROCEDURE — 93010 ELECTROCARDIOGRAM REPORT: CPT | Performed by: INTERNAL MEDICINE

## 2023-01-27 PROCEDURE — 78452 HT MUSCLE IMAGE SPECT MULT: CPT

## 2023-01-27 PROCEDURE — 80061 LIPID PANEL: CPT

## 2023-01-27 PROCEDURE — 3430000000 HC RX DIAGNOSTIC RADIOPHARMACEUTICAL

## 2023-01-27 PROCEDURE — 93005 ELECTROCARDIOGRAM TRACING: CPT | Performed by: INTERNAL MEDICINE

## 2023-01-27 PROCEDURE — 3430000000 HC RX DIAGNOSTIC RADIOPHARMACEUTICAL: Performed by: INTERNAL MEDICINE

## 2023-01-27 PROCEDURE — 99204 OFFICE O/P NEW MOD 45 MIN: CPT | Performed by: INTERNAL MEDICINE

## 2023-01-27 PROCEDURE — 93017 CV STRESS TEST TRACING ONLY: CPT

## 2023-01-27 PROCEDURE — 99235 HOSP IP/OBS SAME DATE MOD 70: CPT | Performed by: INTERNAL MEDICINE

## 2023-01-27 PROCEDURE — 84484 ASSAY OF TROPONIN QUANT: CPT

## 2023-01-27 PROCEDURE — 85027 COMPLETE CBC AUTOMATED: CPT

## 2023-01-27 PROCEDURE — 94760 N-INVAS EAR/PLS OXIMETRY 1: CPT

## 2023-01-27 PROCEDURE — A9502 TC99M TETROFOSMIN: HCPCS | Performed by: INTERNAL MEDICINE

## 2023-01-27 PROCEDURE — 2580000003 HC RX 258: Performed by: INTERNAL MEDICINE

## 2023-01-27 RX ORDER — ESCITALOPRAM OXALATE 10 MG/1
TABLET ORAL
Qty: 30 TABLET | Refills: 1 | Status: SHIPPED | OUTPATIENT
Start: 2023-01-27

## 2023-01-27 RX ORDER — ESCITALOPRAM OXALATE 10 MG/1
5 TABLET ORAL DAILY
Status: DISCONTINUED | OUTPATIENT
Start: 2023-01-27 | End: 2023-01-27 | Stop reason: HOSPADM

## 2023-01-27 RX ADMIN — ATORVASTATIN CALCIUM 40 MG: 40 TABLET, FILM COATED ORAL at 11:05

## 2023-01-27 RX ADMIN — ESCITALOPRAM OXALATE 5 MG: 10 TABLET ORAL at 11:59

## 2023-01-27 RX ADMIN — LOSARTAN POTASSIUM 100 MG: 100 TABLET, FILM COATED ORAL at 11:05

## 2023-01-27 RX ADMIN — TETROFOSMIN 10 MILLICURIE: 1.38 INJECTION, POWDER, LYOPHILIZED, FOR SOLUTION INTRAVENOUS at 08:10

## 2023-01-27 RX ADMIN — SODIUM CHLORIDE, PRESERVATIVE FREE 10 ML: 5 INJECTION INTRAVENOUS at 11:07

## 2023-01-27 RX ADMIN — AMLODIPINE BESYLATE 5 MG: 5 TABLET ORAL at 11:05

## 2023-01-27 RX ADMIN — ASPIRIN 81 MG 81 MG: 81 TABLET ORAL at 11:05

## 2023-01-27 RX ADMIN — TETROFOSMIN 30 MILLICURIE: 1.38 INJECTION, POWDER, LYOPHILIZED, FOR SOLUTION INTRAVENOUS at 09:30

## 2023-01-27 RX ADMIN — PANTOPRAZOLE SODIUM 40 MG: 40 TABLET, DELAYED RELEASE ORAL at 11:05

## 2023-01-27 RX ADMIN — METOPROLOL SUCCINATE 25 MG: 25 TABLET, EXTENDED RELEASE ORAL at 11:05

## 2023-01-27 NOTE — DISCHARGE SUMMARY
[address]                               DISCHARGE SUMMARY    PATIENT NAME: Fernando Cornell                  :        1970  MED REC NO:   [id number]                          ROOM:       [id number]  ACCOUNT NO:   [de-identified]                           ADMIT DATE: 2023  PROVIDER:     Angie Mendez MD                  DISCHARGE DATE:  2023      REASON FOR ADMISSION:  Chest pain. HISTORY OF PRESENT ILLNESS:  This is a 60-year-old white male, who has  been noticing some increasing chest discomfort, which had some  associated indigestion and radiation to his left arm. The pain was  fairly constant and unremitting, but the patient had significant  coronary history with stenting approximately three years ago. The  patient was admitted for further evaluation and treatment. HOSPITAL COURSE:  1. Noncardiac chest pain. The patient said troponins were negative and  he underwent a stress test today, which was unremarkable. The patient  was seen by Cardiology. It was noted recommendations will be discharged  to home on his usual medical regimen including aspirin, Toprol XL, and  the statin therapy. 2.  Anxiety. The patient does have significant anxiety and he has been  started on Lexapro today. He will take 5 mg daily for approximately one  week then increase to 10 mg daily. He will follow up next week with Dr. Phoebe Abraham. 3.  Hyperlipidemia. The patient's lipids are treated with Lipitor, but  his LDL was 91 today. Consider higher dose of statin therapy. 4.  Acid reflux. He will continue on Protonix daily. DISCHARGE MEDICATIONS:  Please see the discharge med rec form for  discharge medications. FOLLOWUP:  Follow up will be with Dr. Phoebe Abraham in one week.         Reyna Smith MD    D: 2023 13:56:42       T: 2023 13:59:12     [email or web address]  Job#: [id number]     Doc#: [id number]    CC:

## 2023-01-27 NOTE — PLAN OF CARE
Problem: Discharge Planning  Goal: Discharge to home or other facility with appropriate resources  1/27/2023 1142 by Yesi Espinoza RN  Outcome: Progressing  Flowsheets (Taken 1/27/2023 1126)  Discharge to home or other facility with appropriate resources: Identify barriers to discharge with patient and caregiver  1/27/2023 0639 by Junius Libman, RN  Outcome: Progressing     Problem: Pain  Goal: Verbalizes/displays adequate comfort level or baseline comfort level  1/27/2023 1142 by Yesi Espinoza RN  Outcome: Progressing  1/27/2023 0639 by Junius Libman, RN  Outcome: Progressing

## 2023-01-27 NOTE — PROGRESS NOTES
Pt returned from stress test without complications. VSS. Call light within reach. Bed in lowest position with wheels locked. Pt resting comfortably. Will continue to monitor.

## 2023-01-27 NOTE — PROGRESS NOTES
Removed pt IV and applied a dry dressing without complications. Reviewed discharge instructions and answered all questions. Pt gathered belongings and walked down for discharge home with family.

## 2023-01-27 NOTE — PLAN OF CARE
Problem: Discharge Planning  Goal: Discharge to home or other facility with appropriate resources  Outcome: Progressing     Problem: Pain per Mar.   Goal: Verbalizes/displays adequate comfort level or baseline comfort level  Outcome: Progressing

## 2023-01-27 NOTE — CONSULTS
[address] [facility] [place]  1970    2023    Reason for Consult: Chest Pain    CC: Chest Pain    HPI:  The patient is 47 y.o. male with a past medical history significant for essential and CAD with prior proximal RCA intervention in  who presented to [facility] ED with chest pain. I was asked to see him for this. He follows with me in the office and I last saw him 22 at which time he was doing well. He relates that his ACEI was changed in December due to cough. He reports some chest congestion then. He relates that yesterday he was feeling anxious with chest soreness. There were no aggravating or relieving factors. He denies any shortness of breath. He denies any current chest pain or tightness. Review of Systems:  Constitutional: No fatigue, weakness, night sweats or fever. HEENT: No new vision difficulties or ringing in the ears. Respiratory: No new SOB, PND, orthopnea or cough. Cardiovascular: See HPI   GI: No n/v, diarrhea, constipation, abdominal pain or changes in bowel habits. No melena, no hematochezia  : No urinary frequency, urgency, incontinence, hematuria or dysuria. Skin: No cyanosis or skin lesions. Musculoskeletal: No new muscle or joint pain. Neurological: No syncope or TIA-like symptoms.   Psychiatric: No anxiety, insomnia or depression     Past Medical History:   Diagnosis Date    CAD (coronary artery disease) 2019    Depression     Since mother  in , Depression    Hyperlipidemia     Hypertension     Lumbar vertebral fracture ([address]     High school football injury     Past Surgical History:   Procedure Laterality Date    COLONOSCOPY  2021    Arciniega-sigmoid diverticulosis, polyp x2, repeat colonoscopy in 5 years    CORONARY ANGIOPLASTY WITH STENT PLACEMENT  2019    Iker Clements- % occluded, KATE x 1.  (30% circ, 50% mid LAD)    PTCA  2019    KATE to RCA    UPPER GASTROINTESTINAL ENDOSCOPY 07/12/2021    Arciniega     Family History   Problem Relation Age of Onset    Hypertension Mother     Anemia Mother         Aplastic Anemia    Cancer Father         CLL     Social History     Tobacco Use    Smoking status: Former     Packs/day: 0.50     Types: Cigarettes     Start date: 1/1/1983     Quit date: 2/1/2019     Years since quitting: 3.9    Smokeless tobacco: Never   Vaping Use    Vaping Use: Never used   Substance Use Topics    Alcohol use: Yes     Comment: Rare    Drug use: No       Allergies   Allergen Reactions    Lisinopril Cough     Current Facility-Administered Medications   Medication Dose Route Frequency Provider Last Rate Last Admin    mometasone-formoterol (DULERA) 200-5 MCG/ACT inhaler 2 puff  2 puff Inhalation BID Keena Collins MD        amLODIPine (NORVASC) tablet 5 mg  5 mg Oral Daily Keena Collins MD   5 mg at 01/27/23 1105    aspirin chewable tablet 81 mg  81 mg Oral Daily Keena Collins MD   81 mg at 01/27/23 1105    atorvastatin (LIPITOR) tablet 40 mg  40 mg Oral Daily Keena Collins MD   40 mg at 01/27/23 1105    losartan (COZAAR) tablet 100 mg  100 mg Oral Daily Keena Collins MD   100 mg at 01/27/23 1105    metoprolol succinate (TOPROL XL) extended release tablet 25 mg  25 mg Oral Daily Keena Collins MD   25 mg at 01/27/23 1105    pantoprazole (PROTONIX) tablet 40 mg  40 mg Oral QAM AC Keena Collins MD   40 mg at 01/27/23 1105    sodium chloride flush 0.9 % injection 5-40 mL  5-40 mL IntraVENous 2 times per day Keena Collins MD   10 mL at 01/27/23 1107    sodium chloride flush 0.9 % injection 5-40 mL  5-40 mL IntraVENous PRN Keena Collins MD        0.9 % sodium chloride infusion   IntraVENous PRN Keena Collins MD        ondansetron (ZOFRAN-ODT) disintegrating tablet 4 mg  4 mg Oral Q8H PRN Keena Collins MD        Or    ondansetron [facility]) injection 4 mg  4 mg IntraVENous Q6H PRN Lyndsey Spivey MD        acetaminophen (TYLENOL) tablet 650 mg  650 mg Oral Q6H PRN Lyndsey Spivey MD        Or    acetaminophen (TYLENOL) suppository 650 mg  650 mg Rectal Q6H PRN Lyndsey Spivey MD        polyethylene glycol [facility]) packet 17 g  17 g Oral Daily PRN Lyndsey Spivey MD        hydrALAZINE (APRESOLINE) injection 10 mg  10 mg IntraVENous Q4H PRN Lyndsey Spivey MD   10 mg at 01/26/23 2103    enoxaparin Sodium (LOVENOX) injection 30 mg  30 mg SubCUTAneous BID Lyndsey Spivey MD   30 mg at 01/26/23 2102       Physical Exam:   /81   Pulse 71   Temp 97.5 °F (36.4 °C) (Oral)   Resp 16   Ht 5' 8\" (1.727 m)   Wt 219 lb (99.3 kg)   SpO2 96%   BMI 33.30 kg/m²   No intake or output data in the 24 hours ending 01/27/23 1121  Wt Readings from Last 2 Encounters:   01/27/23 219 lb (99.3 kg)   01/19/23 227 lb (103 kg)     Constitutional: He is oriented to person, place, and time. He appears well-developed and well-nourished. In no acute distress. Head: Normocephalic and atraumatic. Neck: Neck supple. No JVD present. Carotid bruit is not present. No mass and no thyromegaly present. No lymphadenopathy present. Cardiovascular: Normal rate, regular rhythm, normal heart sounds and intact distal pulses. Exam reveals no gallop and no friction rub. No murmur heard. Pulmonary/Chest: Effort normal and breath sounds normal. No respiratory distress. He has no wheezes, rhonchi or rales. Abdominal: Soft, non-tender. Bowel sounds and aorta are normal. He exhibits no organomegaly, mass or bruit. Extremities: No edema, cyanosis, or clubbing. Pulses are 2+ radial/carotid/dorsalis pedis and posterior tibial bilaterally. Neurological: He is alert and oriented to person, place, and time. He has normal reflexes. No cranial nerve deficit. Coordination normal.   Skin: Skin is warm and dry. There is no rash or diaphoresis.    Psychiatric: He has a normal mood and affect. His speech is normal and behavior is normal.     Personally reviewed and interpreted   EKG Interpretation: Sinus rhythm with normal intervals and axis    Lab Review:   Lab Results   Component Value Date/Time    TRIG 234 02/24/2022 07:44 AM    HDL 52 02/24/2022 07:44 AM    LDLCALC 93 02/24/2022 07:44 AM    LDLDIRECT 125 12/08/2022 05:06 PM    LABVLDL 47 02/24/2022 07:44 AM     Lab Results   Component Value Date/Time     01/26/2023 02:08 PM    K 3.8 01/26/2023 02:08 PM    BUN 18 01/26/2023 02:08 PM    CREATININE 1.0 01/26/2023 02:08 PM     Recent Labs     01/26/23  1408   WBC 6.2   HGB 13.9   HCT 42.9        Left Heart Catheterization 3/14/19:  1. Right-dominant coronary arterial system with 100% proximal RCA  occlusion that was successfully intervened upon with a 3 mm x 20-mm  Synergy drug-eluting stent dilated to 3.30 mm. This restored RADHA 3  flow in the vessel with 0% residual stenosis. There is 30% circumflex  disease and a 50% mid-LAD lesion but otherwise no obstructive lesions. There were collaterals to the right from the left. 2.  Normal left ventricular systolic function with LV ejection fraction  of 60%. 3.  Low left ventricular end-diastolic pressure at 6 to 8 mmHg. 4.  No gradient across the aortic valve on pullback to suggest aortic  stenosis. Assessment / Plan: 1. Chest Pain, atypical  This is noncardiac in nature. His ECG is normal as ar ehis troponin assays. His stress perfusion is normal with normal function. I would not pursue this further. Continue his current cardiac medications. 2.Generalized Anxiety Disorder  Further recommendations from Dr. Obed Downs to discharge to home.

## 2023-01-27 NOTE — H&P
[facility] Internal Medicine  History and Physical      CHIEF COMPLAINT:  My chest hurts    History of Present Illness: This is a 71-year-old white male with history of hypertension, hyperlipidemia and coronary artery disease, status post stent placement to an occluded right coronary artery in 2019. Patient recently was on lisinopril and this was changed to losartan due to a persistent cough. The cough is since resolved but his blood pressure has not been well controlled yet. Additionally, he recently came to the outpatient office with increasing edema of his lower extremities. At this visit his losartan was increased from 50 to 100 mg and his amlodipine was decreased from 10 to 5 mg. He has been wearing compression stockings and his edema has improved. Patient reports that since his blood pressure change he has had increasing anxiety and he has had chest discomfort. Patient reports substernal chest pain which does not tend to radiate and is not necessarily related to activity or exertion. This lasts hours at a time. Patient does report that recently when he was outside using his snowblower during the storm last week he did notice easy fatigability but did not clearly endorse chest discomfort with this. Patient does report increasing anxiety that has been worse lately, he seems to be overwhelmed with worry today. Patient denies cough or wheezing. No shortness of breath. No nausea/vomiting/diarrhea. He did report some perhaps indigestion and some left arm pain with this chest discomfort but its difficult to get a clear history from him this morning. No PND or orthopnea. No dysuria.         Past Medical History:   Diagnosis Date    CAD (coronary artery disease) 2019    Depression     Since mother  in , Depression    Hyperlipidemia     Hypertension     Lumbar vertebral fracture ([facility] [address]     High school football injury         Past Surgical History:   Procedure Laterality Date COLONOSCOPY  07/12/2021    Arciniega-sigmoid diverticulosis, polyp x2, repeat colonoscopy in 5 years    CORONARY ANGIOPLASTY WITH STENT PLACEMENT  03/14/2009    Cecelia Spivey- % occluded, KATE x 1.  (30% circ, 50% mid LAD)    PTCA  03/2019    KATE to RCA    UPPER GASTROINTESTINAL ENDOSCOPY  07/12/2021    Arciniega       Medications Prior to Admission:    Medications Prior to Admission: famotidine (PEPCID) 10 MG tablet, Take 10 mg by mouth 2 times daily as needed (heartburn)  Fiber Adult Gummies 2 g CHEW, Take 2 g by mouth daily  fluticasone (FLONASE) 50 MCG/ACT nasal spray, 1 spray by Each Nostril route daily  losartan (COZAAR) 100 MG tablet, Take 1 tablet by mouth daily  amLODIPine (NORVASC) 5 MG tablet, Take 1 tablet by mouth daily  pantoprazole (PROTONIX) 40 MG tablet, TAKE 1 TABLET BY MOUTH EVERY MORNING BEFORE BREAKFAST  SYMBICORT 160-4.5 MCG/ACT AERO, INHALE 2 PUFFS INTO THE LUNGS TWICE DAILY  atorvastatin (LIPITOR) 40 MG tablet, TAKE 1 TABLET BY MOUTH EVERY NIGHT (Patient taking differently: Take 40 mg by mouth daily)  metoprolol succinate (TOPROL XL) 25 MG extended release tablet, TAKE 1 TABLET BY MOUTH DAILY  sildenafil (REVATIO) 20 MG tablet, TAKE ONE TO FIVE TABLETS BY MOUTH DAILY AS NEEDED FOR ERECTILE DYSFUNCTION  cetirizine (ZYRTEC) 10 MG tablet, Take 10 mg by mouth daily  Multiple Vitamin (MULTI-VITAMIN DAILY PO), Take by mouth daily  Cyanocobalamin (B-12) 100 MCG TABS, Take by mouth daily  aspirin (ASPIRIN CHILDRENS) 81 MG chewable tablet, Take 1 tablet by mouth daily    Allergies:    Lisinopril    Social History:    reports that he quit smoking about 3 years ago. His smoking use included cigarettes. He started smoking about 40 years ago. He smoked an average of .5 packs per day. He has never used smokeless tobacco. He reports current alcohol use. He reports that he does not use drugs. Family History:   family history includes Anemia in his mother; Cancer in his father; Hypertension in his mother.     REVIEW OF SYSTEMS:  As above in the HPI, otherwise negative    PHYSICAL EXAM:    Vitals:  /82   Pulse 64   Temp 98.2 °F (36.8 °C) (Oral)   Resp 16   Ht 5' 8\" (1.727 m)   Wt 280 lb 3.3 oz (127.1 kg)   SpO2 96%   BMI 42.60 kg/m²   Temp  Av.1 °F (36.7 °C)  Min: 97.5 °F (36.4 °C)  Max: 98.6 °F (37 °C)    General:  Awake, alert, oriented X 3. Well developed, well nourished. No apparent distress. HEENT:  Normocephalic, atraumatic. Pupils equal, round, reactive to light. No scleral icterus. No conjunctival injection. Normal lips, teeth, and gums. No nasal discharge. Neck:  Supple. No carotid bruit, carotid upstroke normal.  Heart:  RRR, no murmurs, gallops, or rubs. PMI non-displaced  Lungs:  CTA bilaterally, bilat symmetrical expansion, no wheeze, rales, or rhonchi. Respirations easy. Abdomen: Bowel sounds present, normoactive. Soft, nontender/nondistended. No masses, no peritoneal signs. Extremities:  No clubbing, cyanosis, or edema  Skin:  Warm and dry, no open lesions or rash. Neuro:  Cranial nerves 2-12 intact, no focal deficits. Moves all extremities without difficulty.     LABS and diagnostics reviewed:    Recent Results (from the past 24 hour(s))   EKG 12 Lead    Collection Time: 23  1:54 PM   Result Value Ref Range    Ventricular Rate 77 BPM    Atrial Rate 77 BPM    P-R Interval 174 ms    QRS Duration 114 ms    Q-T Interval 394 ms    QTc Calculation (Bazett) 445 ms    P Axis 55 degrees    R Axis -24 degrees    T Axis 21 degrees    Diagnosis       Normal sinus rhythmNormal ECGWhen compared with ECG of 27-MAR-2021 17:13,Incomplete right bundle branch block is no longer Present   CBC with Auto Differential    Collection Time: 23  2:08 PM   Result Value Ref Range    WBC 6.2 4.0 - 11.0 K/uL    RBC 4.53 4.20 - 5.90 M/uL    Hemoglobin 13.9 13.5 - 17.5 g/dL    Hematocrit 42.9 40.5 - 52.5 %    MCV 94.7 80.0 - 100.0 fL    MCH 30.7 26.0 - 34.0 pg    MCHC 32.5 31.0 - 36.0 g/dL    RDW 14.0 12.4 - 15.4 %    Platelets 540 733 - 161 K/uL    MPV 8.7 5.0 - 10.5 fL    Neutrophils % 56.0 %    Lymphocytes % 23.3 %    Monocytes % 9.9 %    Eosinophils % 7.9 %    Basophils % 2.9 %    Neutrophils Absolute 3.5 1.7 - 7.7 K/uL    Lymphocytes Absolute 1.4 1.0 - 5.1 K/uL    Monocytes Absolute 0.6 0.0 - 1.3 K/uL    Eosinophils Absolute 0.5 0.0 - 0.6 K/uL    Basophils Absolute 0.2 0.0 - 0.2 K/uL   CMP w/ Reflex to MG    Collection Time: 01/26/23  2:08 PM   Result Value Ref Range    Sodium 139 136 - 145 mmol/L    Potassium reflex Magnesium 3.8 3.5 - 5.1 mmol/L    Chloride 100 99 - 110 mmol/L    CO2 23 21 - 32 mmol/L    Anion Gap 16 3 - 16    Glucose 97 70 - 99 mg/dL    BUN 18 7 - 20 mg/dL    Creatinine 1.0 0.9 - 1.3 mg/dL    Est, Glom Filt Rate >60 >60    Calcium 10.0 8.3 - 10.6 mg/dL    Total Protein 7.7 6.4 - 8.2 g/dL    Albumin 4.7 3.4 - 5.0 g/dL    Albumin/Globulin Ratio 1.6 1.1 - 2.2    Total Bilirubin 1.0 0.0 - 1.0 mg/dL    Alkaline Phosphatase 97 40 - 129 U/L    ALT 79 (H) 10 - 40 U/L    AST 50 (H) 15 - 37 U/L   Lipase    Collection Time: 01/26/23  2:08 PM   Result Value Ref Range    Lipase 55.0 13.0 - 60.0 U/L   Troponin    Collection Time: 01/26/23  2:08 PM   Result Value Ref Range    Troponin <0.01 <0.01 ng/mL   Troponin    Collection Time: 01/26/23 10:22 PM   Result Value Ref Range    Troponin <0.01 <0.01 ng/mL     XR CHEST (2 VW) [[id number]]    Collected: 01/26/23 1428    Updated: 01/26/23 1431    Specimen Type: Chest    Narrative:     EXAMINATION:   TWO XRAY VIEWS OF THE CHEST     1/26/2023 1:49 pm     COMPARISON:   None. HISTORY:   ORDERING SYSTEM PROVIDED HISTORY: Chest pain   TECHNOLOGIST PROVIDED HISTORY:   Reason for exam:->Chest pain   Reason for Exam: chest pain     FINDINGS:   HEART/MEDIASTINUM: The cardiomediastinal silhouette is within normal limits. PLEURA/LUNGS: There are no focal consolidations or pleural effusions. There   is no appreciable pneumothorax.      BONES/SOFT TISSUE: No acute abnormality. Impression:     No radiographic evidence of acute pulmonary disease. ASSESSMENT:      Principal Problem:    Precordial pain  Active Problems:    Coronary artery disease due to lipid rich plaque    Hyperlipidemia LDL goal <70    Essential hypertension    Gastroesophageal reflux disease without esophagitis    Anxiety    PLAN:    The patient is n.p.o. and has had 2 negative troponins. Third troponin is still pending. Cardiology has been consulted and stress test has been ordered for today, although the patient is worried about this. Patient has been reporting some lightheadedness but his blood pressures have not been low. Hydralazine was ordered as a as needed medication overnight due to his persistent high blood pressure in the ER and initial admission. Home medications have been reordered. Patient is agreeable to restarting Lexapro which she was on in the past for his anxiety. Patient continues on statin and aspirin therapy. Disposition will be based on stress test/cardiology consult.     I suspect the patient will be able to go home later today with outpatient follow-up for blood pressure next week      Gaviota Hernández MD  7:48 AM  1/27/2023

## 2023-01-27 NOTE — CARE COORDINATION
INITIAL CASE MANAGEMENT ASSESSMENT    Reviewed chart, met with patient to assess possible discharge needs. Explained Case Management role/services. Living Situation: Patient lives with his Girlfriend and family in a house with one step to enter. ADLs: Independent     DME: None    PT/OT Recs: None     Active Services: None     Transportation: Drives/GF will transport. Medications: [facility] in [place]/Girlfriend will transport    PCP: Esperanza Horn MD      HD/PD: N/A    PLAN/COMMENTS: AVS reviewed. No needs. Electronically signed by Diane Marshall RN on 1/27/2023 at 2:26 PM

## 2023-01-27 NOTE — DISCHARGE SUMMARY
Discharge Summary Dictated    Dictation #  [id number]    Time > 30 minutes coordinating discharge,  reviewing chart and patient data, examining and interviewing patient, and discussing with nursing staff, case management/social work, family, etc.

## 2023-01-27 NOTE — PROGRESS NOTES
Stress test is normal.  Cardiology note reviewed. Blood pressure is better. Discharge to home today.   Electronically signed by Gustavo Howell MD on 1/27/2023 at 1:51 PM

## 2023-02-08 ENCOUNTER — OFFICE VISIT (OUTPATIENT)
Dept: CARDIOLOGY CLINIC | Age: 53
End: 2023-02-08
Payer: COMMERCIAL

## 2023-02-08 VITALS
OXYGEN SATURATION: 95 % | HEART RATE: 75 BPM | SYSTOLIC BLOOD PRESSURE: 130 MMHG | WEIGHT: 217 LBS | DIASTOLIC BLOOD PRESSURE: 84 MMHG | BODY MASS INDEX: 32.99 KG/M2

## 2023-02-08 DIAGNOSIS — I25.10 CORONARY ARTERY DISEASE INVOLVING NATIVE CORONARY ARTERY OF NATIVE HEART WITHOUT ANGINA PECTORIS: Primary | ICD-10-CM

## 2023-02-08 DIAGNOSIS — E78.5 HYPERLIPIDEMIA, UNSPECIFIED HYPERLIPIDEMIA TYPE: ICD-10-CM

## 2023-02-08 DIAGNOSIS — F41.9 ANXIETY: ICD-10-CM

## 2023-02-08 DIAGNOSIS — I10 PRIMARY HYPERTENSION: ICD-10-CM

## 2023-02-08 PROCEDURE — G8482 FLU IMMUNIZE ORDER/ADMIN: HCPCS | Performed by: NURSE PRACTITIONER

## 2023-02-08 PROCEDURE — 93000 ELECTROCARDIOGRAM COMPLETE: CPT | Performed by: NURSE PRACTITIONER

## 2023-02-08 PROCEDURE — 3078F DIAST BP <80 MM HG: CPT | Performed by: NURSE PRACTITIONER

## 2023-02-08 PROCEDURE — 3074F SYST BP LT 130 MM HG: CPT | Performed by: NURSE PRACTITIONER

## 2023-02-08 PROCEDURE — G8427 DOCREV CUR MEDS BY ELIG CLIN: HCPCS | Performed by: NURSE PRACTITIONER

## 2023-02-08 PROCEDURE — 99213 OFFICE O/P EST LOW 20 MIN: CPT | Performed by: NURSE PRACTITIONER

## 2023-02-08 PROCEDURE — G8417 CALC BMI ABV UP PARAM F/U: HCPCS | Performed by: NURSE PRACTITIONER

## 2023-02-08 PROCEDURE — 1036F TOBACCO NON-USER: CPT | Performed by: NURSE PRACTITIONER

## 2023-02-08 PROCEDURE — 3017F COLORECTAL CA SCREEN DOC REV: CPT | Performed by: NURSE PRACTITIONER

## 2023-02-08 RX ORDER — ATORVASTATIN CALCIUM 40 MG/1
40 TABLET, FILM COATED ORAL DAILY
Qty: 30 TABLET | Refills: 5
Start: 2023-02-08

## 2023-02-08 NOTE — PROGRESS NOTES
[facility]   Cardiology Office Visit      Date: 2023  CC:    Chief Complaint   Patient presents with    Follow-up    Chest Pain    Hypertension       I had the privilege of visiting Jenaro Carreon in the office. He presents today for follow up after recent hospitalization for chest pain. PMH significnat for CAD with hx of PCI, HLD and tobacco abuse (quit ). Patient had presented to the ED with anxiety and chest pressure. He underwent a stress test during hospitalization which was a low risk scan for ischemia. He reports he has been feeling significantly better since starting on SSRI. He also reports energy level has significantly improved. HPI: Jenaro Carreon is a 47 y.o. Past Medical History:   Diagnosis Date    CAD (coronary artery disease) 2019    Depression     Since mother  in , Depression    Hyperlipidemia     Hypertension     Lumbar vertebral fracture ([address]     High school football injury        Past Surgical History:   Procedure Laterality Date    COLONOSCOPY  2021    Arciniega-sigmoid diverticulosis, polyp x2, repeat colonoscopy in 5 years    CORONARY ANGIOPLASTY WITH STENT PLACEMENT  2019    Cecile Floss- % occluded, KATE x 1.  (30% circ, 50% mid LAD)    PTCA  2019    KATE to RCA    UPPER GASTROINTESTINAL ENDOSCOPY  2021    Arciniega       Allergies: Allergies   Allergen Reactions    Lisinopril Cough       Medication:  Current Outpatient Medications   Medication Sig Dispense Refill    escitalopram (LEXAPRO) 10 MG tablet Take 1/2 tablet daily for 1 week, then take 1 whole tablet daily.  30 tablet 1    Fiber Adult Gummies 2 g CHEW Take 2 g by mouth daily      losartan (COZAAR) 100 MG tablet Take 1 tablet by mouth daily 90 tablet 3    amLODIPine (NORVASC) 5 MG tablet Take 1 tablet by mouth daily 30 tablet 3    pantoprazole (PROTONIX) 40 MG tablet TAKE 1 TABLET BY MOUTH EVERY MORNING BEFORE BREAKFAST 90 tablet 3    SYMBICORT 160-4.5 MCG/ACT AERO INHALE 2 PUFFS INTO THE LUNGS TWICE DAILY 30.6 g 0    atorvastatin (LIPITOR) 40 MG tablet TAKE 1 TABLET BY MOUTH EVERY NIGHT (Patient taking differently: Take 40 mg by mouth daily) 90 tablet 3    metoprolol succinate (TOPROL XL) 25 MG extended release tablet TAKE 1 TABLET BY MOUTH DAILY 90 tablet 3    sildenafil (REVATIO) 20 MG tablet TAKE ONE TO FIVE TABLETS BY MOUTH DAILY AS NEEDED FOR ERECTILE DYSFUNCTION 60 tablet 9    cetirizine (ZYRTEC) 10 MG tablet Take 10 mg by mouth daily      Multiple Vitamin (MULTI-VITAMIN DAILY PO) Take by mouth daily      Cyanocobalamin (B-12) 100 MCG TABS Take by mouth daily      aspirin (ASPIRIN CHILDRENS) 81 MG chewable tablet Take 1 tablet by mouth daily 30 tablet 3    fluticasone (FLONASE) 50 MCG/ACT nasal spray 1 spray by Each Nostril route daily (Patient not taking: Reported on 2/8/2023)       Current Facility-Administered Medications   Medication Dose Route Frequency Provider Last Rate Last Admin    lidocaine 1 % injection 4 mL  4 mL Other Once Dina Mac MD           Social History:  Reviewed. reports that he quit smoking about 4 years ago. His smoking use included cigarettes. He started smoking about 40 years ago. He smoked an average of .5 packs per day. He has never used smokeless tobacco. He reports current alcohol use. He reports that he does not use drugs. Family History:  Reviewed. family history includes Anemia in his mother; Cancer in his father; Hypertension in his mother.        Review of System:     General ROS: negative for chills, fever, change in weight   Psychological ROS: negative for  anxiety or depression  Ophthalmic ROS: negative for  eye pain or loss of vision  ENT ROS: negative for  headaches, sore throat   Allergy and Immunology ROS: negative for  hives  Hematological and Lymphatic ROS: negative for  bleeding problems, blood clots, bruising or jaundice  Endocrine ROS: negative for  skin changes, temperature intolerance or unexpected weight changes  Respiratory ROS: negative for  cough, sputum, wheezing, shortness of breath   Cardiovascular ROS: Per HPI. Gastrointestinal ROS: negative for abdominal pain, diarrhea, nausea/vomiting, bleeding   Musculoskeletal ROS: negative for  joint swelling, pain    Neurological ROS: negative for  confusion, numbness/tingling, seizures, weakness  Dermatological ROS: negative for rash, changes in skin color, lesions     Physical Examination:  /84   Pulse 75   Wt 217 lb (98.4 kg)   SpO2 95%   BMI 32.99 kg/m²     Constitutional: Oriented. No distress. Head: Normocephalic and atraumatic. Mouth/Throat: Oropharynx is clear and moist.   Eyes: Conjunctivae normal. EOM are normal.   Neck: Normal range of motion. Neck supple. No rigidity. No JVD present. Cardiovascular: Normal rate, regular rhythm, S1&S2 and intact distal pulses. Pulmonary/Chest: Bilateral respiratory sounds. No wheezes. No rhonchi. Abdominal: Soft. Bowel sounds present. No distension, No tenderness. Musculoskeletal: No tenderness. No edema    Neurological: Alert and oriented. Cranial nerve appears intact, No Gross deficit   Skin: Skin is warm and dry. No rash noted. Psychiatric: Has a normal mood, affect and behavior       Labs:  Lab Results   Component Value Date    CREATININE 1.0 01/26/2023    BUN 18 01/26/2023     01/26/2023    K 3.8 01/26/2023     01/26/2023    CO2 23 01/26/2023       FASTING LIPID PANEL:  Lab Results   Component Value Date/Time    HDL 53 01/27/2023 12:10 PM    LDLDIRECT 125 12/08/2022 05:06 PM    LDLCALC 91 01/27/2023 12:10 PM    TRIG 91 01/27/2023 12:10 PM             LHC 3/14/19  1. Right-dominant coronary arterial system with 100% proximal RCA  occlusion that was successfully intervened upon with a 3 mm x 20-mm  Synergy drug-eluting stent dilated to 3.30 mm. This restored RADHA 3  flow in the vessel with 0% residual stenosis.   There is 30% circumflex  disease and a 50% mid-LAD lesion but otherwise no obstructive lesions. There were collaterals to the right from the left. 2.  Normal left ventricular systolic function with LV ejection fraction  of 60%. 3.  Low left ventricular end-diastolic pressure at 6 to 8 mmHg. 4.  No gradient across the aortic valve on pullback to suggest aortic  stenosis. ECG:   personally reviewed     Echo:   None       Imaging/Radiology:      Stress Testin23  Conclusions        Summary    Normal myocardial perfusion study. Normal LV size and systolic function. ECG portion of the stress test is normal.    Uncontrolled hypertension. Overall findings represent a low risk study. Assessment:      Plan:  CAD  Stable    Hx of PIC to RCA and non obstructive dx in Cx and LAD    Preserved EF   On Toprol XL, statin, ASA    Continue current regimen   Recent hospitalization with atypical chest pain     Likely worsened with anxiety     Negative stress testing  Continue risk factor modification   2. HLD    On statin therapy      3. HTN  Stable at rest   Elevated during stress testing  Discussed home monitoring and moderate exercise. 4. Anxiety    Improved since starting Lexapro    F/u with PCP             Further evaluation will be based upon the patient's clinical course and testing results. All questions and concerns were addressed to the patient/family. Alternatives to my treatment were discussed.      CONTRERAS Vee-CNP  [facility]  Cardiology  2023  12:14 PM

## 2023-06-22 ENCOUNTER — OFFICE VISIT (OUTPATIENT)
Dept: CARDIOLOGY CLINIC | Age: 53
End: 2023-06-22
Payer: COMMERCIAL

## 2023-06-22 VITALS
DIASTOLIC BLOOD PRESSURE: 70 MMHG | BODY MASS INDEX: 33.19 KG/M2 | OXYGEN SATURATION: 96 % | SYSTOLIC BLOOD PRESSURE: 118 MMHG | HEART RATE: 70 BPM | HEIGHT: 68 IN | WEIGHT: 219 LBS

## 2023-06-22 DIAGNOSIS — Z87.891 FORMER SMOKER: ICD-10-CM

## 2023-06-22 DIAGNOSIS — I25.10 CORONARY ARTERY DISEASE INVOLVING NATIVE CORONARY ARTERY OF NATIVE HEART WITHOUT ANGINA PECTORIS: Primary | ICD-10-CM

## 2023-06-22 DIAGNOSIS — F41.1 GAD (GENERALIZED ANXIETY DISORDER): ICD-10-CM

## 2023-06-22 DIAGNOSIS — I10 ESSENTIAL HYPERTENSION: ICD-10-CM

## 2023-06-22 DIAGNOSIS — E78.5 HYPERLIPIDEMIA LDL GOAL <70: ICD-10-CM

## 2023-06-22 PROCEDURE — 3078F DIAST BP <80 MM HG: CPT | Performed by: INTERNAL MEDICINE

## 2023-06-22 PROCEDURE — G8417 CALC BMI ABV UP PARAM F/U: HCPCS | Performed by: INTERNAL MEDICINE

## 2023-06-22 PROCEDURE — 3074F SYST BP LT 130 MM HG: CPT | Performed by: INTERNAL MEDICINE

## 2023-06-22 PROCEDURE — G8427 DOCREV CUR MEDS BY ELIG CLIN: HCPCS | Performed by: INTERNAL MEDICINE

## 2023-06-22 PROCEDURE — 1036F TOBACCO NON-USER: CPT | Performed by: INTERNAL MEDICINE

## 2023-06-22 PROCEDURE — 3017F COLORECTAL CA SCREEN DOC REV: CPT | Performed by: INTERNAL MEDICINE

## 2023-06-22 PROCEDURE — 99214 OFFICE O/P EST MOD 30 MIN: CPT | Performed by: INTERNAL MEDICINE

## 2023-06-22 RX ORDER — ROSUVASTATIN CALCIUM 40 MG/1
40 TABLET, COATED ORAL DAILY
Qty: 30 TABLET | Refills: 5 | Status: CANCELLED | OUTPATIENT
Start: 2023-06-22

## 2023-06-22 RX ORDER — ATORVASTATIN CALCIUM 80 MG/1
80 TABLET, FILM COATED ORAL DAILY
Qty: 90 TABLET | Refills: 3 | Status: SHIPPED | OUTPATIENT
Start: 2023-06-22

## 2023-06-22 NOTE — PROGRESS NOTES
[address]    Yolanda Wilkerson  1970    2023    CC: \"I feel really good\"     HPI:  The patient is 47 y.o. male with a past medical history significant for CAD with prior PCI, hyperlipidemia and tobacco abuse. He presented for stress testing and developed inferior ST elevation. He underwent left heart catheterization on 3/14/19 resulting in KATE to RCA. He presented to the ED 23 for chest pain and completed a normal NM stress test.     Today he presents for follow up and states that overall he is feeling better since his ED visit. He denies any new sounding cardiac complaints. He denies any chest pains or worsening shortness of breath. He states he remains active with exercising and yard work without any exertional symptoms. He reports medication compliance and is tolerating. He denies any abnormal bleeding or bruising. He denies exertional chest pain/pressure, dyspnea at rest, worsening WAGONER, PND, orthopnea, palpitations, lightheadedness, weight changes, changes in LE edema, and syncope. Review of Systems:  Constitutional: No fatigue, weakness, night sweats or fever. HEENT: No new vision difficulties or ringing in the ears. Respiratory: No new SOB, PND, orthopnea or cough. Cardiovascular: See HPI   GI: No n/v, diarrhea, constipation, abdominal pain or changes in bowel habits. No melena, no hematochezia  : No urinary frequency, urgency, incontinence, hematuria or dysuria. Skin: No cyanosis or skin lesions. Musculoskeletal: No new muscle or joint pain. Neurological: No syncope or TIA-like symptoms.   Psychiatric: No anxiety, insomnia or depression     Past Medical History:   Diagnosis Date    CAD (coronary artery disease) 2019    Depression     Since mother  in , Depression    Hyperlipidemia     Hypertension     Lumbar vertebral fracture ([facility] [address]     High school football injury     Past Surgical History:   Procedure Laterality Date    COLONOSCOPY

## 2023-09-28 LAB — IGA SERPL-MCNC: 161 MG/DL (ref 70–400)

## 2023-09-29 LAB — TISSUE TRANSGLUTAMINASE IGA: <0.5 U/ML (ref 0–14)

## 2023-10-04 LAB
Lab: NORMAL
REPORT: NORMAL
THIS TEST SENT TO: NORMAL

## 2024-04-18 NOTE — TELEPHONE ENCOUNTER
Last OV: 6/22/23  Next OV: not scheduled.  Requested to return in one year.  Last refill: 6/22/23  Most recent Labs: LDL, CMP 2/15/24; Lipid panel 8/10/23  Last EKG (if needed):

## 2024-04-19 RX ORDER — ATORVASTATIN CALCIUM 80 MG/1
80 TABLET, FILM COATED ORAL DAILY
Qty: 90 TABLET | Refills: 3 | Status: SHIPPED | OUTPATIENT
Start: 2024-04-19

## 2024-07-24 ENCOUNTER — HOSPITAL ENCOUNTER (EMERGENCY)
Age: 54
Discharge: HOME | End: 2024-07-24
Payer: COMMERCIAL

## 2024-07-24 VITALS
DIASTOLIC BLOOD PRESSURE: 74 MMHG | HEIGHT: 68 IN | BODY MASS INDEX: 35.08 KG/M2 | WEIGHT: 231.48 LBS | OXYGEN SATURATION: 97 % | SYSTOLIC BLOOD PRESSURE: 120 MMHG | HEART RATE: 79 BPM | TEMPERATURE: 98.2 F | RESPIRATION RATE: 16 BRPM

## 2024-07-24 DIAGNOSIS — D64.9 ANEMIA, UNSPECIFIED TYPE: ICD-10-CM

## 2024-07-24 DIAGNOSIS — Z87.19 HISTORY OF RECTAL BLEEDING: Primary | ICD-10-CM

## 2024-07-24 LAB
ABO + RH BLD: NORMAL
ALBUMIN SERPL-MCNC: 4.4 G/DL (ref 3.4–5)
ALBUMIN/GLOB SERPL: 1.6 {RATIO} (ref 1.1–2.2)
ALP SERPL-CCNC: 90 U/L (ref 40–129)
ALT SERPL-CCNC: 17 U/L (ref 10–40)
ANION GAP SERPL CALCULATED.3IONS-SCNC: 12 MMOL/L (ref 3–16)
AST SERPL-CCNC: 21 U/L (ref 15–37)
BASOPHILS # BLD: 0.1 K/UL (ref 0–0.2)
BASOPHILS NFR BLD: 2.4 %
BILIRUB SERPL-MCNC: 0.9 MG/DL (ref 0–1)
BLD GP AB SCN SERPL QL: NORMAL
BUN SERPL-MCNC: 12 MG/DL (ref 7–20)
CALCIUM SERPL-MCNC: 8.8 MG/DL (ref 8.3–10.6)
CHLORIDE SERPL-SCNC: 105 MMOL/L (ref 99–110)
CO2 SERPL-SCNC: 20 MMOL/L (ref 21–32)
CREAT SERPL-MCNC: 1 MG/DL (ref 0.9–1.3)
DEPRECATED RDW RBC AUTO: 18.3 % (ref 12.4–15.4)
EOSINOPHIL # BLD: 0.3 K/UL (ref 0–0.6)
EOSINOPHIL NFR BLD: 7.8 %
GFR SERPLBLD CREATININE-BSD FMLA CKD-EPI: 90 ML/MIN/{1.73_M2}
GLUCOSE SERPL-MCNC: 88 MG/DL (ref 70–99)
HCT VFR BLD AUTO: 24.4 % (ref 40.5–52.5)
HGB BLD-MCNC: 7.4 G/DL (ref 13.5–17.5)
LYMPHOCYTES # BLD: 1.2 K/UL (ref 1–5.1)
LYMPHOCYTES NFR BLD: 30.5 %
MCH RBC QN AUTO: 24.5 PG (ref 26–34)
MCHC RBC AUTO-ENTMCNC: 30.6 G/DL (ref 31–36)
MCV RBC AUTO: 80.1 FL (ref 80–100)
MONOCYTES # BLD: 0.4 K/UL (ref 0–1.3)
MONOCYTES NFR BLD: 10.8 %
NEUTROPHILS # BLD: 2 K/UL (ref 1.7–7.7)
NEUTROPHILS NFR BLD: 48.5 %
PLATELET # BLD AUTO: 235 K/UL (ref 135–450)
PMV BLD AUTO: 7.8 FL (ref 5–10.5)
POTASSIUM SERPL-SCNC: 3.9 MMOL/L (ref 3.5–5.1)
PROT SERPL-MCNC: 7.1 G/DL (ref 6.4–8.2)
RBC # BLD AUTO: 3.04 M/UL (ref 4.2–5.9)
SODIUM SERPL-SCNC: 137 MMOL/L (ref 136–145)
WBC # BLD AUTO: 4.1 K/UL (ref 4–11)

## 2024-07-24 PROCEDURE — 86901 BLOOD TYPING SEROLOGIC RH(D): CPT

## 2024-07-24 PROCEDURE — 86900 BLOOD TYPING SEROLOGIC ABO: CPT

## 2024-07-24 PROCEDURE — 86850 RBC ANTIBODY SCREEN: CPT

## 2024-07-24 PROCEDURE — 85025 COMPLETE CBC W/AUTO DIFF WBC: CPT

## 2024-07-24 PROCEDURE — 99283 EMERGENCY DEPT VISIT LOW MDM: CPT

## 2024-07-24 PROCEDURE — 80053 COMPREHEN METABOLIC PANEL: CPT

## 2024-07-25 ASSESSMENT — ENCOUNTER SYMPTOMS
ANAL BLEEDING: 0
COLOR CHANGE: 0
BLOOD IN STOOL: 0
ABDOMINAL PAIN: 0
SHORTNESS OF BREATH: 0
VOMITING: 0

## 2024-07-25 NOTE — ED NOTES
D/C: Order noted for d/c. Pt confirmed d/c paperwork has correct name. Discharge and education instructions reviewed with patient. Teach-back successful.  Pt verbalized understanding and denied questions at this time. No acute distress noted. Patient instructed to follow-up as noted - return to emergency department if symptoms worsen. Patient verbalized understanding. Discharged per EDMD with discharge instructions. Pt discharged to private vehicle. Patient stable upon departure. Thanked patient for choosing [facility] for care. Denies pain at the time of discharge

## 2024-07-25 NOTE — ED PROVIDER NOTES
[facility] EMERGENCY DEPARTMENT  EMERGENCY DEPARTMENT ENCOUNTER        Pt Name: Omar Wilkerson  MRN: [id number]  Birthdate 1970  Date of evaluation: 7/24/2024  Provider: SARA Castillo  PCP: Baljeet Ramírez MD  Note Started: 1:28 AM EDT 7/25/24      DARIEN. I have evaluated this patient.        CHIEF COMPLAINT       Chief Complaint   Patient presents with    Rectal Bleeding     States hx of rectal bleeding.  Received labs yesterday and advised that hgb 6.8       HISTORY OF PRESENT ILLNESS: 1 or more Elements     History from : Patient    Limitations to history : None    Omar Wilkerson is a 54 y.o. male who presents after being instructed to come by his primary care physician for a hemoglobin of reported 6.8.  Patient had lab work drawn with his primary care this morning.  They are watching his hemoglobin because he has been having intermittent bleeding from hemorrhoids.  The patient has internal hemorrhoids which she tells me will were initially treated by Dr. Arciniega of GI and he had banding x 15 without success.  He was then referred to [facility] and he has had an embolization.  About 4 weeks ago this was done.  He tells me that the bleeding has \"definitely slowed down\" however he continues to have days when he does have rectal bleeding.  He tells me he has not had any heavy rectal bleeding since before the procedure.  He is currently taking folic acid and iron every other day per his primary care instructions.  He denies abdominal pain fevers or vomiting.    Nursing Notes were all reviewed and agreed with or any disagreements were addressed in the HPI.    REVIEW OF SYSTEMS :      Review of Systems   Constitutional:  Negative for fever.   Respiratory:  Negative for shortness of breath.    Cardiovascular:  Negative for chest pain.   Gastrointestinal:  Negative for abdominal pain, anal bleeding (not currently), blood in stool (not currently) and vomiting.   Skin:

## 2024-12-10 ENCOUNTER — OFFICE VISIT (OUTPATIENT)
Dept: SURGERY | Age: 54
End: 2024-12-10
Payer: COMMERCIAL

## 2024-12-10 VITALS
DIASTOLIC BLOOD PRESSURE: 84 MMHG | WEIGHT: 232 LBS | BODY MASS INDEX: 35.28 KG/M2 | TEMPERATURE: 98.1 F | HEART RATE: 84 BPM | OXYGEN SATURATION: 97 % | SYSTOLIC BLOOD PRESSURE: 137 MMHG | RESPIRATION RATE: 16 BRPM

## 2024-12-10 DIAGNOSIS — K60.2 ANAL FISSURE: Primary | ICD-10-CM

## 2024-12-10 DIAGNOSIS — K64.2 GRADE III HEMORRHOIDS: ICD-10-CM

## 2024-12-10 PROCEDURE — 3079F DIAST BP 80-89 MM HG: CPT | Performed by: SURGERY

## 2024-12-10 PROCEDURE — 3075F SYST BP GE 130 - 139MM HG: CPT | Performed by: SURGERY

## 2024-12-10 PROCEDURE — 99204 OFFICE O/P NEW MOD 45 MIN: CPT | Performed by: SURGERY

## 2024-12-10 NOTE — PROGRESS NOTES
choices; surgery if no improvement  Risk of complications/morbidity: moderate    Patient has signs and symptoms consistent with anal fissure.  Exam reveals anterior midline anal fissure with spasm.  We will start with conservative management, including fiber supplementation, water, healthy fruits and vegetables, and medical management with prescription topical calcium channel blocker ointment.  Discussed the use of the prescription ointment and that it will need to be obtained from a compounding pharmacy, which my office will arrange.  If symptoms do not improve in 6-8 weeks, patient may require more invasive treatment options, such as Botox injection, partial sphincterotomy, or fissurectomy with anocutaneous advancement flap. We briefly discussed operative risks of these various options.    Patient understands the need for full anorectal exam in the future after resolution of symptoms (or colonoscopy depending on other risk factors for colon cancer).    He likely has prolapsing hemorrhoids as well, which can be dealt with either with hemorrhoid banding or hemorrhoidectomy.    I was a little concerned that he was under the expectation, potentially that hemorrhoid surgery would fix all of his functional issues or fix his pain, but I reiterated to him that it would nearly only take care of the anatomic hemorrhoids and would potentially worsen some of his other functional issues.    The fact that he was very tender with very minimal manipulation is also a concern that I do not think he would tolerate hemorrhoid surgery very well.  Will try to treat him for anal fissure and spasm and reassess in 6 to 8 weeks    I provided written information in the After Visit Summary AVS Regarding: Anal fissure    DISPOSITION:  F/u in 6-8 weeks for symptom reassessment    My findings will be relayed to consulting practitioner or PCP via Epic    Note completed using dictation software, please excuse any errors.    Electronically signed

## 2025-02-21 ENCOUNTER — TELEPHONE (OUTPATIENT)
Dept: SURGERY | Age: 55
End: 2025-02-21

## 2025-02-21 ENCOUNTER — OFFICE VISIT (OUTPATIENT)
Dept: SURGERY | Age: 55
End: 2025-02-21
Payer: COMMERCIAL

## 2025-02-21 DIAGNOSIS — K60.2 ANAL FISSURE: Primary | ICD-10-CM

## 2025-02-21 DIAGNOSIS — K64.2 GRADE III HEMORRHOIDS: ICD-10-CM

## 2025-02-21 PROCEDURE — 99213 OFFICE O/P EST LOW 20 MIN: CPT | Performed by: SURGERY

## 2025-02-21 NOTE — PROGRESS NOTES
[facility] [place] COLON AND RECTAL [facility]GERY  [address]  [address]  [place] [address]  Dept: [phone]  Dept Fax: [phone]  Loc: [phone]    Visit Date: 2/21/2025    Omar Wilkerson is a 54 y.o. male who presents today for: Follow-up (Follow Up- Hemorrhoids/Patient denies bleeding, Patient states pain, Patient states Constipation  )      HPI:       Omar Wilkerson is a 54 y.o. male known to me for history of anal fissure and history of hemorrhoids.  Feels like his pain is improving, but still having prolapse symptoms of his hemorrhoids.  Has been using ointment as prescribed    Objective:     Physical Exam   There were no vitals taken for this visit.  Constitutional: Appears well-developed and well-nourished. Grooming appropriate. No gross deformities. There is no height or weight on file to calculate BMI.    Abdominal/wound: Soft, nontender    Anorectal Exam: Chaperone present in room throughout exam.  Patient placed in the left lateral position.  Buttocks spread.    Digital rectal exam performed with lubricated finger.  Findings reveal: Fissure improving, much less tender, still with some spasm; anoscopy deferred for now    Labs reviewed: None  Imaging reviewed: None    Coordination/discussion of care: None    Date of last Colonoscopy: 8/21/2023   No cologuard on file  No FIT/FOBT on file   No flexible sigmoidoscopy on file      Assessment/Plan:       A/P:  Established problem(s): Chronic anal fissure, prolapsing internal hemorrhoids  Additional workup/treatment planned: Continue calcium channel blocker ointment, plan for hemorrhoid banding at next visit  Risk of complications/morbidity: Moderate    Overall I think that his fissure is improving.  Would continue ointment for now until pain is 100% gone.    His biggest issue is his chronic prolapsing internal hemorrhoids.  He has had several banding's in the past, and he

## 2025-02-21 NOTE — TELEPHONE ENCOUNTER
Called in patient 's CCB Ointment for patient at [facility]. Called patient informing that I called the [facility] and at they will give the patient a call when its ready to be picked up.

## 2025-03-13 ENCOUNTER — OFFICE VISIT (OUTPATIENT)
Dept: SURGERY | Age: 55
End: 2025-03-13
Payer: COMMERCIAL

## 2025-03-13 VITALS
OXYGEN SATURATION: 97 % | BODY MASS INDEX: 34.86 KG/M2 | HEIGHT: 68 IN | DIASTOLIC BLOOD PRESSURE: 84 MMHG | HEART RATE: 92 BPM | WEIGHT: 230 LBS | SYSTOLIC BLOOD PRESSURE: 138 MMHG

## 2025-03-13 DIAGNOSIS — K60.2 ANAL FISSURE: Primary | ICD-10-CM

## 2025-03-13 DIAGNOSIS — K64.2 GRADE III HEMORRHOIDS: ICD-10-CM

## 2025-03-13 PROCEDURE — 46221 LIGATION OF HEMORRHOID(S): CPT | Performed by: SURGERY

## 2025-03-13 NOTE — PROGRESS NOTES
INTERNAL HEMORRHOID RUBBER BAND LIGATION PROCEDURE NOTE:    Patient presented with symptomatic internal hemorrhoids unresponsive to conservative management. See previous office notes for details of previous history and treatments.     Risks of rubber band ligation explained to patient, including but not limited to: immediate and delayed bleeding, pain, infection, external hemorrhoids thrombosis, pelvic sepsis, urinary retention, sphincter dysfunction, need for additional procedures, and recurrence. Patient was previously provided with information in office AVS (after visit summary) and given opportunity to ask any questions and bring up any concerns.     Chaperone/MA present in room during entire procedure.    Patient was placed in either lateral or knee-chest positioning depending on patient preference. Exam table manipulated for proper visualization and lighting. Buttocks spread. Digital exam and anoscopy performed confirming internal hemorrhoids.    Suction rubber band ligator used to place band in 2 positions, 1 cm proximal to the dentate line, at the apex of the internal hemorrhoid.    Anoscope removed. Patient tolerated the procedure well without complication. EBL minimal. Post procedure expectations and instructions explained to patient. Written instructions provided as well.    Disposition: Follow up PRN. Needs diarrhea/IBS treated    Electronically signed by Evens Simon MD on 3/13/2025 at 10:08 AM

## 2025-04-23 RX ORDER — ATORVASTATIN CALCIUM 80 MG/1
80 TABLET, FILM COATED ORAL DAILY
Qty: 30 TABLET | Refills: 0 | Status: SHIPPED | OUTPATIENT
Start: 2025-04-23

## 2025-04-23 NOTE — TELEPHONE ENCOUNTER
Requested Prescriptions     Pending Prescriptions Disp Refills    atorvastatin (LIPITOR) 80 MG tablet 30 tablet 0     Sig: Take 1 tablet by mouth daily        Last OV: 6/22/2023  Next OV: Pt over due since 6/2024  Last refill:4/19/2024  Most recent Labs: 2/13/2025       Sent [facility] for appointment

## 2025-05-20 RX ORDER — ATORVASTATIN CALCIUM 80 MG/1
80 TABLET, FILM COATED ORAL DAILY
Qty: 30 TABLET | Refills: 0 | Status: SHIPPED | OUTPATIENT
Start: 2025-05-20

## 2025-05-20 NOTE — TELEPHONE ENCOUNTER
Requested Prescriptions     Pending Prescriptions Disp Refills    atorvastatin (LIPITOR) 80 MG tablet [Pharmacy Med Name: ATORVASTATIN 80MG TABLETS] 30 tablet 0     Sig: TAKE 1 TABLET BY MOUTH DAILY        Last OV: 6/22/2023  Next OV: Over due for an apt   Return in about 1 year (around 6/22/2024).  Last refill:4/23/2025  Most recent Labs: CMP 2/13/2025

## 2025-06-16 RX ORDER — ATORVASTATIN CALCIUM 80 MG/1
80 TABLET, FILM COATED ORAL DAILY
Qty: 30 TABLET | Refills: 1 | Status: SHIPPED | OUTPATIENT
Start: 2025-06-16

## 2025-06-16 RX ORDER — ATORVASTATIN CALCIUM 80 MG/1
80 TABLET, FILM COATED ORAL DAILY
Qty: 30 TABLET | Refills: 0 | OUTPATIENT
Start: 2025-06-16

## 2025-06-16 NOTE — TELEPHONE ENCOUNTER
Last OV: 23   Andrew  Next OV: 25   Andrew  Last Labs: 25  Last EK23   Last Filled: 25 - #30     1tab qd           0rf    Documented must keep appt for future fills to pharmacy. Gave 1 refill to get him to appt

## 2025-07-21 DIAGNOSIS — E78.5 HYPERLIPIDEMIA, UNSPECIFIED HYPERLIPIDEMIA TYPE: Primary | ICD-10-CM

## 2025-07-21 RX ORDER — ATORVASTATIN CALCIUM 80 MG/1
80 TABLET, FILM COATED ORAL DAILY
Qty: 30 TABLET | Refills: 0 | Status: SHIPPED | OUTPATIENT
Start: 2025-07-21

## 2025-07-21 NOTE — TELEPHONE ENCOUNTER
Last OV: 06/22/23  Next OV: 07/29/25  Last refill:06/16/25  Most recent Labs: Lipid 08/15/24  Last EKG (if needed):02/08/23

## 2025-07-29 ENCOUNTER — OFFICE VISIT (OUTPATIENT)
Dept: CARDIOLOGY CLINIC | Age: 55
End: 2025-07-29
Payer: COMMERCIAL

## 2025-07-29 VITALS
HEART RATE: 63 BPM | DIASTOLIC BLOOD PRESSURE: 74 MMHG | WEIGHT: 226.4 LBS | BODY MASS INDEX: 34.31 KG/M2 | OXYGEN SATURATION: 97 % | SYSTOLIC BLOOD PRESSURE: 120 MMHG | HEIGHT: 68 IN | RESPIRATION RATE: 16 BRPM

## 2025-07-29 DIAGNOSIS — I10 ESSENTIAL HYPERTENSION: ICD-10-CM

## 2025-07-29 DIAGNOSIS — E78.5 HYPERLIPIDEMIA, UNSPECIFIED HYPERLIPIDEMIA TYPE: ICD-10-CM

## 2025-07-29 DIAGNOSIS — I25.10 ATHEROSCLEROSIS OF NATIVE CORONARY ARTERY OF NATIVE HEART WITHOUT ANGINA PECTORIS: Primary | ICD-10-CM

## 2025-07-29 PROCEDURE — G2211 COMPLEX E/M VISIT ADD ON: HCPCS | Performed by: INTERNAL MEDICINE

## 2025-07-29 PROCEDURE — 93000 ELECTROCARDIOGRAM COMPLETE: CPT | Performed by: INTERNAL MEDICINE

## 2025-07-29 PROCEDURE — 99214 OFFICE O/P EST MOD 30 MIN: CPT | Performed by: INTERNAL MEDICINE

## 2025-07-29 PROCEDURE — 3074F SYST BP LT 130 MM HG: CPT | Performed by: INTERNAL MEDICINE

## 2025-07-29 PROCEDURE — 3078F DIAST BP <80 MM HG: CPT | Performed by: INTERNAL MEDICINE

## 2025-07-29 RX ORDER — ATORVASTATIN CALCIUM 80 MG/1
80 TABLET, FILM COATED ORAL DAILY
Qty: 90 TABLET | Refills: 3 | Status: SHIPPED | OUTPATIENT
Start: 2025-07-29

## 2025-07-29 RX ORDER — DICYCLOMINE HCL 20 MG
20 TABLET ORAL EVERY 6 HOURS PRN
COMMUNITY
Start: 2025-05-23

## 2025-07-29 NOTE — PROGRESS NOTES
[facility]    Omar Wilkerson  1970    2025    CC: \" I am feeling well\"    HPI:  The patient is 55 y.o. male with a past medical history significant for CAD with prior PCI, hyperlipidemia and tobacco abuse. He presented for stress testing and developed inferior ST elevation. He underwent left heart catheterization on 3/14/19 resulting in KATE to RCA. He presented to the ED 23 for chest pain and completed a normal NM stress test.     Today he presents for follow up and states that overall he is feeling well cardiacwise.  He is being worked up for IBS over the last few years and it has been hard for him to have regular exercise regimen. He reports medication compliance and is tolerating. He denies any abnormal bleeding or bruising. He denies exertional chest pain/pressure, dyspnea at rest, worsening WAGONER, PND, orthopnea, palpitations, lightheadedness, weight changes, changes in LE edema, and syncope.     Review of Systems:  Constitutional: No fatigue, weakness, night sweats or fever.   HEENT: No new vision difficulties or ringing in the ears.  Respiratory: No new SOB, PND, orthopnea or cough.   Cardiovascular: See HPI   GI: No n/v, diarrhea, constipation, abdominal pain or changes in bowel habits.  No melena, no hematochezia  : No urinary frequency, urgency, incontinence, hematuria or dysuria.  Skin: No cyanosis or skin lesions.  Musculoskeletal: No new muscle or joint pain.  Neurological: No syncope or TIA-like symptoms.  Psychiatric: No anxiety, insomnia or depression     Past Medical History:   Diagnosis Date    CAD (coronary artery disease) 2019    Depression     Since mother  in , Depression    Hyperlipidemia     Hypertension     Lumbar vertebral fracture (HCC)     High school football injury     Past Surgical History:   Procedure Laterality Date    COLONOSCOPY  2021    Arciniega-sigmoid diverticulosis, polyp x2, repeat colonoscopy in 5 years

## 2025-09-05 ENCOUNTER — TELEPHONE (OUTPATIENT)
Dept: SURGERY | Age: 55
End: 2025-09-05